# Patient Record
Sex: MALE | Race: OTHER | HISPANIC OR LATINO | Employment: FULL TIME | URBAN - METROPOLITAN AREA
[De-identification: names, ages, dates, MRNs, and addresses within clinical notes are randomized per-mention and may not be internally consistent; named-entity substitution may affect disease eponyms.]

---

## 2018-05-30 ENCOUNTER — APPOINTMENT (OUTPATIENT)
Dept: RADIOLOGY | Facility: MEDICAL CENTER | Age: 43
End: 2018-05-30
Attending: EMERGENCY MEDICINE

## 2018-05-30 ENCOUNTER — HOSPITAL ENCOUNTER (EMERGENCY)
Facility: MEDICAL CENTER | Age: 43
End: 2018-05-30
Attending: EMERGENCY MEDICINE

## 2018-05-30 VITALS
TEMPERATURE: 97.3 F | HEART RATE: 81 BPM | OXYGEN SATURATION: 100 % | HEIGHT: 68 IN | RESPIRATION RATE: 16 BRPM | BODY MASS INDEX: 26.63 KG/M2 | SYSTOLIC BLOOD PRESSURE: 121 MMHG | DIASTOLIC BLOOD PRESSURE: 82 MMHG | WEIGHT: 175.71 LBS

## 2018-05-30 DIAGNOSIS — S20.211A CONTUSION OF RIGHT CHEST WALL, INITIAL ENCOUNTER: ICD-10-CM
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
## 2018-05-30 DIAGNOSIS — S20.211A CONTUSION OF RIB ON RIGHT SIDE, INITIAL ENCOUNTER: ICD-10-CM

## 2018-05-30 DIAGNOSIS — S46.911A SHOULDER STRAIN, RIGHT, INITIAL ENCOUNTER: ICD-10-CM

## 2018-05-30 DIAGNOSIS — T07.XXXA ABRASIONS OF MULTIPLE SITES: ICD-10-CM

## 2018-05-30 PROCEDURE — 71101 X-RAY EXAM UNILAT RIBS/CHEST: CPT | Mod: RT

## 2018-05-30 PROCEDURE — 99283 EMERGENCY DEPT VISIT LOW MDM: CPT

## 2018-05-30 PROCEDURE — 73030 X-RAY EXAM OF SHOULDER: CPT | Mod: RT

## 2018-05-30 RX ORDER — IBUPROFEN 800 MG/1
800 TABLET ORAL EVERY 8 HOURS PRN
Qty: 30 TAB | Refills: 0 | Status: ON HOLD | OUTPATIENT
Start: 2018-05-30 | End: 2020-04-16

## 2018-05-30 RX ORDER — HYDROCODONE BITARTRATE AND ACETAMINOPHEN 5; 325 MG/1; MG/1
1 TABLET ORAL EVERY 8 HOURS PRN
Qty: 9 TAB | Refills: 0 | Status: SHIPPED | OUTPATIENT
Start: 2018-05-30 | End: 2018-06-02

## 2018-05-30 ASSESSMENT — ENCOUNTER SYMPTOMS
NAUSEA: 0
HEADACHES: 1
VOMITING: 0
LOSS OF CONSCIOUSNESS: 0

## 2018-05-30 ASSESSMENT — PAIN SCALES - GENERAL: PAINLEVEL_OUTOF10: 8
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
## 2018-06-06 ASSESSMENT — ENCOUNTER SYMPTOMS
FEVER: 0
SPUTUM PRODUCTION: 0

## 2020-01-23 ENCOUNTER — HOSPITAL ENCOUNTER (EMERGENCY)
Dept: HOSPITAL 8 - ED | Age: 45
Discharge: HOME | End: 2020-01-23
Payer: MEDICAID

## 2020-01-23 VITALS — BODY MASS INDEX: 27.26 KG/M2 | WEIGHT: 179.9 LBS | HEIGHT: 68 IN

## 2020-01-23 VITALS — DIASTOLIC BLOOD PRESSURE: 74 MMHG | SYSTOLIC BLOOD PRESSURE: 146 MMHG

## 2020-01-23 DIAGNOSIS — K04.7: Primary | ICD-10-CM

## 2020-01-23 PROCEDURE — 99283 EMERGENCY DEPT VISIT LOW MDM: CPT

## 2020-04-09 ENCOUNTER — HOSPITAL ENCOUNTER (EMERGENCY)
Dept: HOSPITAL 8 - ED | Age: 45
Discharge: HOME | End: 2020-04-09
Payer: MEDICAID

## 2020-04-09 VITALS — HEIGHT: 71 IN | WEIGHT: 222.47 LBS | BODY MASS INDEX: 31.15 KG/M2

## 2020-04-09 VITALS — SYSTOLIC BLOOD PRESSURE: 108 MMHG | DIASTOLIC BLOOD PRESSURE: 58 MMHG

## 2020-04-09 DIAGNOSIS — R42: ICD-10-CM

## 2020-04-09 DIAGNOSIS — B34.9: Primary | ICD-10-CM

## 2020-04-09 DIAGNOSIS — R00.0: ICD-10-CM

## 2020-04-09 DIAGNOSIS — R06.02: ICD-10-CM

## 2020-04-09 DIAGNOSIS — R73.9: ICD-10-CM

## 2020-04-09 DIAGNOSIS — F17.200: ICD-10-CM

## 2020-04-09 LAB
ALBUMIN SERPL-MCNC: 2.8 G/DL (ref 3.4–5)
ANION GAP SERPL CALC-SCNC: 10 MMOL/L (ref 5–15)
BASOPHILS # BLD AUTO: 0.01 X10^3/UL (ref 0–0.1)
BASOPHILS NFR BLD AUTO: 0 % (ref 0–1)
CALCIUM SERPL-MCNC: 7.2 MG/DL (ref 8.5–10.1)
CHLORIDE SERPL-SCNC: 109 MMOL/L (ref 98–107)
CREAT SERPL-MCNC: 1.26 MG/DL (ref 0.7–1.3)
EOSINOPHIL # BLD AUTO: 0.02 X10^3/UL (ref 0–0.4)
EOSINOPHIL NFR BLD AUTO: 0 % (ref 1–7)
ERYTHROCYTE [DISTWIDTH] IN BLOOD BY AUTOMATED COUNT: 13.1 % (ref 9.4–14.8)
LYMPHOCYTES # BLD AUTO: 0.5 X10^3/UL (ref 1–3.4)
LYMPHOCYTES NFR BLD AUTO: 5 % (ref 22–44)
MCH RBC QN AUTO: 28.6 PG (ref 27.5–34.5)
MCHC RBC AUTO-ENTMCNC: 33.8 G/DL (ref 33.2–36.2)
MCV RBC AUTO: 84.7 FL (ref 81–97)
MD: (no result)
MONOCYTES # BLD AUTO: 0.08 X10^3/UL (ref 0.2–0.8)
MONOCYTES NFR BLD AUTO: 1 % (ref 2–9)
NEUTROPHILS # BLD AUTO: 10.57 X10^3/UL (ref 1.8–6.8)
NEUTROPHILS NFR BLD AUTO: 95 % (ref 42–75)
PH BLDV: 7.39 PH (ref 7.32–7.42)
PLATELET # BLD AUTO: 159 X10^3/UL (ref 130–400)
PMV BLD AUTO: 9 FL (ref 7.4–10.4)
RBC # BLD AUTO: 4.34 X10^6/UL (ref 4.38–5.82)

## 2020-04-09 PROCEDURE — 82010 KETONE BODYS QUAN: CPT

## 2020-04-09 PROCEDURE — 71045 X-RAY EXAM CHEST 1 VIEW: CPT

## 2020-04-09 PROCEDURE — 85025 COMPLETE CBC W/AUTO DIFF WBC: CPT

## 2020-04-09 PROCEDURE — 80048 BASIC METABOLIC PNL TOTAL CA: CPT

## 2020-04-09 PROCEDURE — 93005 ELECTROCARDIOGRAM TRACING: CPT

## 2020-04-09 PROCEDURE — 82040 ASSAY OF SERUM ALBUMIN: CPT

## 2020-04-09 PROCEDURE — 82803 BLOOD GASES ANY COMBINATION: CPT

## 2020-04-09 PROCEDURE — 99285 EMERGENCY DEPT VISIT HI MDM: CPT

## 2020-04-09 PROCEDURE — 36415 COLL VENOUS BLD VENIPUNCTURE: CPT

## 2020-04-09 NOTE — NUR
DISCHARGE INSTRUCTIONS GIVEN TO PATIENT. PT GIVEN PRESCRIPTIONS WITH 
INFORMATION ON MEDICATIONS TO INCLUDE USE AND SIDE EFFECTS. RN DISCUSSES 
CORRECT DISPOSAL OF MEDICATIONS.



PT VERBALIZES UNDERSTANDING OF ALL INSTRUCTIONS AND FOLLOW UP.



PT AMBULATED OUT OF ED PER PEDIS.
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
## 2020-04-15 ENCOUNTER — APPOINTMENT (OUTPATIENT)
Dept: RADIOLOGY | Facility: MEDICAL CENTER | Age: 45
DRG: 617 | End: 2020-04-15
Attending: EMERGENCY MEDICINE
Payer: MEDICAID

## 2020-04-15 ENCOUNTER — HOSPITAL ENCOUNTER (INPATIENT)
Facility: MEDICAL CENTER | Age: 45
LOS: 5 days | DRG: 617 | End: 2020-04-21
Attending: EMERGENCY MEDICINE | Admitting: HOSPITALIST
Payer: MEDICAID

## 2020-04-15 DIAGNOSIS — M86.9 OSTEOMYELITIS OF RIGHT FOOT, UNSPECIFIED TYPE (HCC): ICD-10-CM

## 2020-04-15 DIAGNOSIS — E11.65 TYPE 2 DIABETES MELLITUS WITH HYPERGLYCEMIA, WITHOUT LONG-TERM CURRENT USE OF INSULIN (HCC): ICD-10-CM

## 2020-04-15 DIAGNOSIS — M21.962 DEFORMITY OF LEFT FOOT: ICD-10-CM

## 2020-04-15 DIAGNOSIS — M25.552 ACUTE HIP PAIN, LEFT: ICD-10-CM

## 2020-04-15 DIAGNOSIS — M86.271 SUBACUTE OSTEOMYELITIS OF RIGHT FOOT (HCC): ICD-10-CM

## 2020-04-15 LAB
ANION GAP SERPL CALC-SCNC: 14 MMOL/L (ref 7–16)
ANISOCYTOSIS BLD QL SMEAR: ABNORMAL
BASOPHILS # BLD AUTO: 0 % (ref 0–1.8)
BASOPHILS # BLD: 0 K/UL (ref 0–0.12)
BUN SERPL-MCNC: 20 MG/DL (ref 8–22)
CALCIUM SERPL-MCNC: 8.6 MG/DL (ref 8.5–10.5)
CHLORIDE SERPL-SCNC: 99 MMOL/L (ref 96–112)
CO2 SERPL-SCNC: 20 MMOL/L (ref 20–33)
CREAT SERPL-MCNC: 0.87 MG/DL (ref 0.5–1.4)
EOSINOPHIL # BLD AUTO: 0.22 K/UL (ref 0–0.51)
EOSINOPHIL NFR BLD: 1.8 % (ref 0–6.9)
ERYTHROCYTE [DISTWIDTH] IN BLOOD BY AUTOMATED COUNT: 38.5 FL (ref 35.9–50)
GLUCOSE BLD-MCNC: 230 MG/DL (ref 65–99)
GLUCOSE SERPL-MCNC: 237 MG/DL (ref 65–99)
HCT VFR BLD AUTO: 41 % (ref 42–52)
HGB BLD-MCNC: 13.8 G/DL (ref 14–18)
LYMPHOCYTES # BLD AUTO: 1.97 K/UL (ref 1–4.8)
LYMPHOCYTES NFR BLD: 15.9 % (ref 22–41)
MANUAL DIFF BLD: NORMAL
MCH RBC QN AUTO: 28.2 PG (ref 27–33)
MCHC RBC AUTO-ENTMCNC: 33.7 G/DL (ref 33.7–35.3)
MCV RBC AUTO: 83.7 FL (ref 81.4–97.8)
MICROCYTES BLD QL SMEAR: ABNORMAL
MONOCYTES # BLD AUTO: 0 K/UL (ref 0–0.85)
MONOCYTES NFR BLD AUTO: 0 % (ref 0–13.4)
MORPHOLOGY BLD-IMP: NORMAL
NEUTROPHILS # BLD AUTO: 10.21 K/UL (ref 1.82–7.42)
NEUTROPHILS NFR BLD: 82.3 % (ref 44–72)
NRBC # BLD AUTO: 0 K/UL
NRBC BLD-RTO: 0 /100 WBC
PLATELET # BLD AUTO: 284 K/UL (ref 164–446)
PLATELET BLD QL SMEAR: NORMAL
PMV BLD AUTO: 10.6 FL (ref 9–12.9)
POTASSIUM SERPL-SCNC: 3.9 MMOL/L (ref 3.6–5.5)
RBC # BLD AUTO: 4.9 M/UL (ref 4.7–6.1)
RBC BLD AUTO: PRESENT
SODIUM SERPL-SCNC: 133 MMOL/L (ref 135–145)
VARIANT LYMPHS BLD QL SMEAR: NORMAL
WBC # BLD AUTO: 12.4 K/UL (ref 4.8–10.8)

## 2020-04-15 PROCEDURE — 80048 BASIC METABOLIC PNL TOTAL CA: CPT

## 2020-04-15 PROCEDURE — 82962 GLUCOSE BLOOD TEST: CPT

## 2020-04-15 PROCEDURE — 99285 EMERGENCY DEPT VISIT HI MDM: CPT

## 2020-04-15 PROCEDURE — 83036 HEMOGLOBIN GLYCOSYLATED A1C: CPT

## 2020-04-15 PROCEDURE — 73630 X-RAY EXAM OF FOOT: CPT | Mod: RT

## 2020-04-15 PROCEDURE — 85027 COMPLETE CBC AUTOMATED: CPT

## 2020-04-15 PROCEDURE — 85007 BL SMEAR W/DIFF WBC COUNT: CPT

## 2020-04-16 ENCOUNTER — APPOINTMENT (OUTPATIENT)
Dept: RADIOLOGY | Facility: MEDICAL CENTER | Age: 45
DRG: 617 | End: 2020-04-16
Attending: NURSE PRACTITIONER
Payer: MEDICAID

## 2020-04-16 PROBLEM — E11.65 TYPE 2 DIABETES MELLITUS WITH HYPERGLYCEMIA, WITHOUT LONG-TERM CURRENT USE OF INSULIN (HCC): Status: ACTIVE | Noted: 2020-04-16

## 2020-04-16 PROBLEM — M25.552 ACUTE HIP PAIN, LEFT: Status: ACTIVE | Noted: 2020-04-16

## 2020-04-16 PROBLEM — M00.9 SEPTIC ARTHRITIS OF RIGHT FOOT (HCC): Status: ACTIVE | Noted: 2020-04-16

## 2020-04-16 PROBLEM — D64.9 NORMOCYTIC ANEMIA: Status: ACTIVE | Noted: 2020-04-16

## 2020-04-16 PROBLEM — M86.271 SUBACUTE OSTEOMYELITIS OF RIGHT FOOT (HCC): Status: ACTIVE | Noted: 2020-04-16

## 2020-04-16 LAB
EST. AVERAGE GLUCOSE BLD GHB EST-MCNC: 200 MG/DL
GLUCOSE BLD-MCNC: 199 MG/DL (ref 65–99)
GLUCOSE BLD-MCNC: 201 MG/DL (ref 65–99)
GLUCOSE BLD-MCNC: 204 MG/DL (ref 65–99)
GLUCOSE BLD-MCNC: 252 MG/DL (ref 65–99)
HBA1C MFR BLD: 8.6 % (ref 0–5.6)

## 2020-04-16 PROCEDURE — 36415 COLL VENOUS BLD VENIPUNCTURE: CPT

## 2020-04-16 PROCEDURE — 99255 IP/OBS CONSLTJ NEW/EST HI 80: CPT | Mod: GC | Performed by: INTERNAL MEDICINE

## 2020-04-16 PROCEDURE — A9270 NON-COVERED ITEM OR SERVICE: HCPCS | Performed by: HOSPITALIST

## 2020-04-16 PROCEDURE — 700111 HCHG RX REV CODE 636 W/ 250 OVERRIDE (IP): Performed by: HOSPITALIST

## 2020-04-16 PROCEDURE — 87040 BLOOD CULTURE FOR BACTERIA: CPT

## 2020-04-16 PROCEDURE — 700111 HCHG RX REV CODE 636 W/ 250 OVERRIDE (IP): Performed by: EMERGENCY MEDICINE

## 2020-04-16 PROCEDURE — 700101 HCHG RX REV CODE 250: Performed by: NURSE PRACTITIONER

## 2020-04-16 PROCEDURE — 99223 1ST HOSP IP/OBS HIGH 75: CPT | Performed by: HOSPITALIST

## 2020-04-16 PROCEDURE — 700102 HCHG RX REV CODE 250 W/ 637 OVERRIDE(OP): Performed by: HOSPITALIST

## 2020-04-16 PROCEDURE — 770006 HCHG ROOM/CARE - MED/SURG/GYN SEMI*

## 2020-04-16 PROCEDURE — 700105 HCHG RX REV CODE 258: Performed by: EMERGENCY MEDICINE

## 2020-04-16 PROCEDURE — 99222 1ST HOSP IP/OBS MODERATE 55: CPT | Performed by: NURSE PRACTITIONER

## 2020-04-16 PROCEDURE — 82962 GLUCOSE BLOOD TEST: CPT | Mod: 91

## 2020-04-16 PROCEDURE — 700105 HCHG RX REV CODE 258: Performed by: HOSPITALIST

## 2020-04-16 PROCEDURE — 96365 THER/PROPH/DIAG IV INF INIT: CPT

## 2020-04-16 PROCEDURE — 73501 X-RAY EXAM HIP UNI 1 VIEW: CPT | Mod: LT

## 2020-04-16 PROCEDURE — 96368 THER/DIAG CONCURRENT INF: CPT

## 2020-04-16 RX ORDER — OXYCODONE HYDROCHLORIDE 5 MG/1
2.5 TABLET ORAL
Status: DISCONTINUED | OUTPATIENT
Start: 2020-04-16 | End: 2020-04-21 | Stop reason: HOSPADM

## 2020-04-16 RX ORDER — POLYETHYLENE GLYCOL 3350 17 G/17G
1 POWDER, FOR SOLUTION ORAL
Status: DISCONTINUED | OUTPATIENT
Start: 2020-04-16 | End: 2020-04-21 | Stop reason: HOSPADM

## 2020-04-16 RX ORDER — PROCHLORPERAZINE EDISYLATE 5 MG/ML
5-10 INJECTION INTRAMUSCULAR; INTRAVENOUS EVERY 4 HOURS PRN
Status: DISCONTINUED | OUTPATIENT
Start: 2020-04-16 | End: 2020-04-21 | Stop reason: HOSPADM

## 2020-04-16 RX ORDER — ONDANSETRON 2 MG/ML
4 INJECTION INTRAMUSCULAR; INTRAVENOUS EVERY 4 HOURS PRN
Status: DISCONTINUED | OUTPATIENT
Start: 2020-04-16 | End: 2020-04-21 | Stop reason: HOSPADM

## 2020-04-16 RX ORDER — ONDANSETRON 4 MG/1
4 TABLET, ORALLY DISINTEGRATING ORAL EVERY 4 HOURS PRN
Status: DISCONTINUED | OUTPATIENT
Start: 2020-04-16 | End: 2020-04-21 | Stop reason: HOSPADM

## 2020-04-16 RX ORDER — MORPHINE SULFATE 4 MG/ML
2 INJECTION, SOLUTION INTRAMUSCULAR; INTRAVENOUS
Status: DISCONTINUED | OUTPATIENT
Start: 2020-04-16 | End: 2020-04-21 | Stop reason: HOSPADM

## 2020-04-16 RX ORDER — INSULIN GLARGINE 100 [IU]/ML
0.1 INJECTION, SOLUTION SUBCUTANEOUS EVERY EVENING
Status: DISCONTINUED | OUTPATIENT
Start: 2020-04-16 | End: 2020-04-19

## 2020-04-16 RX ORDER — LIDOCAINE 50 MG/G
1 PATCH TOPICAL EVERY 24 HOURS
Status: DISCONTINUED | OUTPATIENT
Start: 2020-04-16 | End: 2020-04-21 | Stop reason: HOSPADM

## 2020-04-16 RX ORDER — BACLOFEN 10 MG/1
10 TABLET ORAL 3 TIMES DAILY PRN
Status: DISCONTINUED | OUTPATIENT
Start: 2020-04-16 | End: 2020-04-19

## 2020-04-16 RX ORDER — CLONIDINE HYDROCHLORIDE 0.1 MG/1
0.1 TABLET ORAL EVERY 6 HOURS PRN
Status: DISCONTINUED | OUTPATIENT
Start: 2020-04-16 | End: 2020-04-21 | Stop reason: HOSPADM

## 2020-04-16 RX ORDER — DEXTROSE MONOHYDRATE 25 G/50ML
50 INJECTION, SOLUTION INTRAVENOUS
Status: DISCONTINUED | OUTPATIENT
Start: 2020-04-16 | End: 2020-04-19

## 2020-04-16 RX ORDER — BISACODYL 10 MG
10 SUPPOSITORY, RECTAL RECTAL
Status: DISCONTINUED | OUTPATIENT
Start: 2020-04-16 | End: 2020-04-21 | Stop reason: HOSPADM

## 2020-04-16 RX ORDER — SODIUM CHLORIDE, SODIUM LACTATE, POTASSIUM CHLORIDE, CALCIUM CHLORIDE 600; 310; 30; 20 MG/100ML; MG/100ML; MG/100ML; MG/100ML
INJECTION, SOLUTION INTRAVENOUS CONTINUOUS
Status: DISCONTINUED | OUTPATIENT
Start: 2020-04-16 | End: 2020-04-19

## 2020-04-16 RX ORDER — PROMETHAZINE HYDROCHLORIDE 12.5 MG/1
12.5-25 SUPPOSITORY RECTAL EVERY 4 HOURS PRN
Status: DISCONTINUED | OUTPATIENT
Start: 2020-04-16 | End: 2020-04-21 | Stop reason: HOSPADM

## 2020-04-16 RX ORDER — AMOXICILLIN 250 MG
2 CAPSULE ORAL 2 TIMES DAILY
Status: DISCONTINUED | OUTPATIENT
Start: 2020-04-16 | End: 2020-04-21 | Stop reason: HOSPADM

## 2020-04-16 RX ORDER — PROMETHAZINE HYDROCHLORIDE 25 MG/1
12.5-25 TABLET ORAL EVERY 4 HOURS PRN
Status: DISCONTINUED | OUTPATIENT
Start: 2020-04-16 | End: 2020-04-21 | Stop reason: HOSPADM

## 2020-04-16 RX ORDER — ACETAMINOPHEN 325 MG/1
650 TABLET ORAL EVERY 6 HOURS PRN
Status: DISCONTINUED | OUTPATIENT
Start: 2020-04-16 | End: 2020-04-21 | Stop reason: HOSPADM

## 2020-04-16 RX ORDER — OXYCODONE HYDROCHLORIDE 5 MG/1
5 TABLET ORAL
Status: DISCONTINUED | OUTPATIENT
Start: 2020-04-16 | End: 2020-04-21 | Stop reason: HOSPADM

## 2020-04-16 RX ADMIN — VANCOMYCIN HYDROCHLORIDE 900 MG: 500 INJECTION, POWDER, LYOPHILIZED, FOR SOLUTION INTRAVENOUS at 09:22

## 2020-04-16 RX ADMIN — VANCOMYCIN HYDROCHLORIDE 900 MG: 500 INJECTION, POWDER, LYOPHILIZED, FOR SOLUTION INTRAVENOUS at 17:22

## 2020-04-16 RX ADMIN — MORPHINE SULFATE 2 MG: 4 INJECTION INTRAVENOUS at 23:13

## 2020-04-16 RX ADMIN — INSULIN LISPRO 3 UNITS: 100 INJECTION, SOLUTION INTRAVENOUS; SUBCUTANEOUS at 02:31

## 2020-04-16 RX ADMIN — VANCOMYCIN HYDROCHLORIDE 1900 MG: 500 INJECTION, POWDER, LYOPHILIZED, FOR SOLUTION INTRAVENOUS at 00:15

## 2020-04-16 RX ADMIN — INSULIN GLARGINE 8 UNITS: 100 INJECTION, SOLUTION SUBCUTANEOUS at 17:32

## 2020-04-16 RX ADMIN — INSULIN LISPRO 2 UNITS: 100 INJECTION, SOLUTION INTRAVENOUS; SUBCUTANEOUS at 13:14

## 2020-04-16 RX ADMIN — SODIUM CHLORIDE, POTASSIUM CHLORIDE, SODIUM LACTATE AND CALCIUM CHLORIDE: 600; 310; 30; 20 INJECTION, SOLUTION INTRAVENOUS at 05:33

## 2020-04-16 RX ADMIN — AMPICILLIN SODIUM AND SULBACTAM SODIUM 3 G: 2; 1 INJECTION, POWDER, FOR SOLUTION INTRAMUSCULAR; INTRAVENOUS at 13:07

## 2020-04-16 RX ADMIN — OXYCODONE HYDROCHLORIDE 5 MG: 5 TABLET ORAL at 01:52

## 2020-04-16 RX ADMIN — AMPICILLIN SODIUM AND SULBACTAM SODIUM 3 G: 2; 1 INJECTION, POWDER, FOR SOLUTION INTRAMUSCULAR; INTRAVENOUS at 00:01

## 2020-04-16 RX ADMIN — AMPICILLIN SODIUM AND SULBACTAM SODIUM 3 G: 2; 1 INJECTION, POWDER, FOR SOLUTION INTRAMUSCULAR; INTRAVENOUS at 20:22

## 2020-04-16 RX ADMIN — AMPICILLIN SODIUM AND SULBACTAM SODIUM 3 G: 2; 1 INJECTION, POWDER, FOR SOLUTION INTRAMUSCULAR; INTRAVENOUS at 04:39

## 2020-04-16 RX ADMIN — LIDOCAINE 1 PATCH: 50 PATCH TOPICAL at 17:25

## 2020-04-16 RX ADMIN — INSULIN LISPRO 1 UNITS: 100 INJECTION, SOLUTION INTRAVENOUS; SUBCUTANEOUS at 17:30

## 2020-04-16 ASSESSMENT — ENCOUNTER SYMPTOMS
NEUROLOGICAL NEGATIVE: 1
FEVER: 0
GASTROINTESTINAL NEGATIVE: 1
RESPIRATORY NEGATIVE: 1
CHILLS: 0
EYES NEGATIVE: 1
VOMITING: 0
COUGH: 0
CHILLS: 1
CARDIOVASCULAR NEGATIVE: 1
SHORTNESS OF BREATH: 0
PSYCHIATRIC NEGATIVE: 1
DIZZINESS: 0
NAUSEA: 0
BLURRED VISION: 0
FEVER: 1
ABDOMINAL PAIN: 0

## 2020-04-16 ASSESSMENT — COGNITIVE AND FUNCTIONAL STATUS - GENERAL
TURNING FROM BACK TO SIDE WHILE IN FLAT BAD: A LITTLE
MOVING FROM LYING ON BACK TO SITTING ON SIDE OF FLAT BED: A LITTLE
MOVING TO AND FROM BED TO CHAIR: A LITTLE
SUGGESTED CMS G CODE MODIFIER MOBILITY: CK
DRESSING REGULAR LOWER BODY CLOTHING: A LITTLE
HELP NEEDED FOR BATHING: A LITTLE
CLIMB 3 TO 5 STEPS WITH RAILING: A LITTLE
DRESSING REGULAR UPPER BODY CLOTHING: A LITTLE
SUGGESTED CMS G CODE MODIFIER DAILY ACTIVITY: CJ
WALKING IN HOSPITAL ROOM: A LITTLE
DAILY ACTIVITIY SCORE: 20
MOBILITY SCORE: 18
STANDING UP FROM CHAIR USING ARMS: A LITTLE
TOILETING: A LITTLE

## 2020-04-16 ASSESSMENT — LIFESTYLE VARIABLES
AVERAGE NUMBER OF DAYS PER WEEK YOU HAVE A DRINK CONTAINING ALCOHOL: 0
CONSUMPTION TOTAL: NEGATIVE
EVER HAD A DRINK FIRST THING IN THE MORNING TO STEADY YOUR NERVES TO GET RID OF A HANGOVER: NO
ON A TYPICAL DAY WHEN YOU DRINK ALCOHOL HOW MANY DRINKS DO YOU HAVE: 0
TOTAL SCORE: 0
TOTAL SCORE: 0
HAVE PEOPLE ANNOYED YOU BY CRITICIZING YOUR DRINKING: NO
ALCOHOL_USE: NO
HOW MANY TIMES IN THE PAST YEAR HAVE YOU HAD 5 OR MORE DRINKS IN A DAY: 0
EVER_SMOKED: YES
HAVE YOU EVER FELT YOU SHOULD CUT DOWN ON YOUR DRINKING: NO
TOTAL SCORE: 0
EVER FELT BAD OR GUILTY ABOUT YOUR DRINKING: NO

## 2020-04-16 ASSESSMENT — PATIENT HEALTH QUESTIONNAIRE - PHQ9
1. LITTLE INTEREST OR PLEASURE IN DOING THINGS: NOT AT ALL
SUM OF ALL RESPONSES TO PHQ9 QUESTIONS 1 AND 2: 0
2. FEELING DOWN, DEPRESSED, IRRITABLE, OR HOPELESS: NOT AT ALL

## 2020-04-16 NOTE — ED NOTES
Started Vanco infusion. Pt awake, call light within reach, watching TV, comfortable. Waiting on admission bed placement.

## 2020-04-16 NOTE — PROGRESS NOTES
Pt admitted from ED, report received from CARTER Coreas. Pt on unit at approximately 0115 via gurney. Ambulated to bed with standby assist. Dressing placed on right foot wound. Assessment complete. IV vanco infusing, no adverse rxn noted at this time. Medicated for right foot pain per MAR. Call light within reach, personal belongings available, bed in lowest position, treaded socks on, and hourly rounding in place.

## 2020-04-16 NOTE — ASSESSMENT & PLAN NOTE
-Previous diagnosis, not currently on a medical regimen.   -Plan for basal and prandial insulin dosing while here, it may be able to be discharged with oral therapy ultimately.  -4/16 hemoglobin A1c: 8.6%  -Diabetic Educator saw patient; to f/u on Mon; will need supplies ordered before d/c
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
## 2020-04-16 NOTE — WOUND TEAM
Collaborated with LPS JAIMIE Cheatham regarding patient wound consult regarding the right foot and great toe. Per APN, patient most likely going to surgery and is not appropriate for wound team follow at this time, LPS to follow. Completing wound consult, can place new consult if appropriate for wound team.

## 2020-04-16 NOTE — ASSESSMENT & PLAN NOTE
-Leukocytosis   - uncontrolled diabetes mellitus.    -Dr. Robledo completed orthopedic surgery 4/17  -POD#3:  1.  Right foot first ray amputation.  2.  Trimming of dystrophic callus, right foot under first metatarsal.  3.  Percutaneous tendo-Achilles lengthening.  -Continue intravenous antibiotic therapy per ID : Unasyn; can switch to PO Augmentin, pending pathology results for duration of abx; anticipated approx 6wks    -Monitor surgical culture results.  -Limb preservation service following.

## 2020-04-16 NOTE — PROGRESS NOTES
LIMB PRESERVATION SERVICE     45-year-old male newly diagnosed with diabetes.  Presents with right great toe, first MTH  infected ulcer.  Patient has been n.p.o. since midnight for scheduled surgery today.  However he was delivered a neighbor's lunch tray and he ate it despite knowing he had surgery this afternoon.    Surgery canceled    Plan  Diabetic diet  N.p.o. at midnight for surgery tomorrow morning with Dr. Robledo

## 2020-04-16 NOTE — ASSESSMENT & PLAN NOTE
-Likely muscle spasms  -Lidocaine patch  -As needed baclofen started 4/16  -Left hip x-ray negative for acute abnormalities.  -Switched to Flexiril 5mg BID

## 2020-04-16 NOTE — H&P
Hospital Medicine History & Physical Note    Date of Service  4/16/2020    Primary Care Physician  Pcp Not In Computer    Consultants  Orthopedic surgery Dr. Rolle    Code Status  Full code    Chief Complaint  Foot discharge    History of Presenting Illness  45 y.o. male with history of diabetes mellitus for which he has never received treatment, was in his usual state of health until approximately 6 days prior to admission.  He began to notice a callus on his right foot just by his great toe.  He reports no pain associated with a callus, and has limited pain in his feet.  He reports some fever and chills, and noticed on the day prior to admission, that his foot began to have discharge while he was in the shower.  He also noted the callus or at least part of the callus to have fallen off.  He reports that the discharge was foul-smelling.  He denies headache or vision changes, chest pain or shortness of breath, abdominal pain, nausea vomiting, diarrhea or constipation.  He has no other complaints.    Review of Systems  Review of Systems   Constitutional: Positive for chills and fever.   HENT: Negative.    Eyes: Negative.    Respiratory: Negative.    Cardiovascular: Negative.    Gastrointestinal: Negative.    Genitourinary: Negative.    Musculoskeletal: Positive for joint pain.   Skin: Negative.    Neurological: Negative.    Endo/Heme/Allergies: Negative.    Psychiatric/Behavioral: Negative.        Past Medical History  Possible diabetes mellitus    Surgical History   has no past surgical history on file.     Family History  Reviewed and nonpertinent    Social History   reports that he has been smoking. He has never used smokeless tobacco. He reports previous alcohol use. He reports previous drug use.    Allergies  No Known Allergies    Medications  Prior to Admission Medications   Prescriptions Last Dose Informant Patient Reported? Taking?   ibuprofen (MOTRIN) 800 MG Tab   No No   Sig: Take 1 Tab by mouth every 8  hours as needed.      Facility-Administered Medications: None       Physical Exam  Temp:  [36 °C (96.8 °F)] 36 °C (96.8 °F)  Pulse:  [80-98] 80  Resp:  [16-20] 16  BP: (112-134)/(70-83) 134/70  SpO2:  [97 %-100 %] 100 %    Physical Exam  Constitutional:       General: He is not in acute distress.     Appearance: Normal appearance. He is not ill-appearing.   HENT:      Head: Normocephalic and atraumatic.      Nose: Nose normal.      Mouth/Throat:      Mouth: Mucous membranes are moist.      Pharynx: Oropharynx is clear. No oropharyngeal exudate or posterior oropharyngeal erythema.   Eyes:      Extraocular Movements: Extraocular movements intact.      Conjunctiva/sclera: Conjunctivae normal.      Pupils: Pupils are equal, round, and reactive to light.   Neck:      Musculoskeletal: Normal range of motion and neck supple. No muscular tenderness.   Cardiovascular:      Rate and Rhythm: Normal rate and regular rhythm.      Pulses: Normal pulses.      Heart sounds: Normal heart sounds. No murmur. No friction rub. No gallop.    Pulmonary:      Effort: Pulmonary effort is normal. No respiratory distress.      Breath sounds: Normal breath sounds. No wheezing, rhonchi or rales.   Abdominal:      General: Abdomen is flat. Bowel sounds are normal. There is no distension.      Palpations: Abdomen is soft. There is no mass.      Tenderness: There is no abdominal tenderness.   Musculoskeletal: Normal range of motion.         General: Deformity present. No swelling.      Comments: Swelling of right great toe, prior overlying surgical scarring noted, callous noted foul discharge present    Lymphadenopathy:      Cervical: No cervical adenopathy.   Skin:     General: Skin is warm and dry.      Findings: Lesion present.   Neurological:      General: No focal deficit present.      Mental Status: He is alert and oriented to person, place, and time.      Cranial Nerves: No cranial nerve deficit.      Motor: No weakness.      Gait: Gait  normal.   Psychiatric:         Mood and Affect: Mood normal.         Behavior: Behavior normal.         Laboratory:  Recent Labs     04/15/20  2310   WBC 12.4*   RBC 4.90   HEMOGLOBIN 13.8*   HEMATOCRIT 41.0*   MCV 83.7   MCH 28.2   MCHC 33.7   RDW 38.5   PLATELETCT 284   MPV 10.6     Recent Labs     04/15/20  2310   SODIUM 133*   POTASSIUM 3.9   CHLORIDE 99   CO2 20   GLUCOSE 237*   BUN 20   CREATININE 0.87   CALCIUM 8.6     Recent Labs     04/15/20  2310   GLUCOSE 237*         No results for input(s): NTPROBNP in the last 72 hours.      No results for input(s): TROPONINT in the last 72 hours.    Urinalysis:    No results found     Imaging:  DX-FOOT-COMPLETE 3+ RIGHT   Final Result      Soft tissue swelling of the RIGHT great toe with gas present as well as a bony resorption of the distal tuft and interphalangeal joint consistent with osteomyelitis and septic arthritis.            Assessment/Plan:  I anticipate this patient will require at least two midnights for appropriate medical management, necessitating inpatient admission.    * Subacute osteomyelitis of right foot (HCC)  Assessment & Plan  Setting of uncontrolled diabetes mellitus.  Plan for orthopedic evaluation with Dr. Rolle has been consulted.  Additionally, we will start intravenous antibiotic therapy.  Monitor all culture results.    Septic arthritis of right foot (HCC)  Assessment & Plan  Plan for intravenous antibiotic therapy, monitor.  Appreciate orthopedic surgery.    Type 2 diabetes mellitus with hyperglycemia, without long-term current use of insulin (HCC)  Assessment & Plan  Previous diagnosis, not currently on a medical regimen.  Plan for basal and prandial insulin dosing while here, it may be able to be discharged with oral therapy ultimately.    Normocytic anemia  Assessment & Plan  Suspect chronic.  With no current evidence of bleed.  Transfuse if needed for hemoglobin less than 7 gm/dL          VTE prophylaxis: SCD

## 2020-04-16 NOTE — PROGRESS NOTES
"Pharmacy Kinetics   45 y.o. male on vancomycin day # 1 2020    Currently on Vancomycin 900 mg iv q8hr (0100 0900 1700)  Provider specified end date: TBD    Indication for Treatment: osteomylitis    Pertinent history per medical record: Admitted on 4/15/2020 for existing diabetic food wound on right great toe. Pt stated that he received IV anabiotics from [facility], after talking to [place] from [facility] ER, pt was only given 1L NS in ER on , and discharged with albuterol inhaler and benzonatate, none antibiotics given. Pt today was afebrile with mild leukocytosis. CT results showed gas present as well as a bony resorption of the distal tuft and interphalangeal joint consistent with osteomyelitis and septic arthritis. Ortho consult pending.     Other antibiotics: Unasyn 3g IV q6hr x 12 doses ordered    Allergies: Patient has no known allergies.     List concerns for renal function: none    Pertinent cultures to date:   none    MRSA nares swab if pneumonia is a concern (ordered/positive/negative/n-a): ordered    Recent Labs     04/15/20  2310   WBC 12.4*   NEUTSPOLYS 82.30*     Recent Labs     04/15/20  2310   BUN 20   CREATININE 0.87     No results for input(s): VANCOTROUGH, VANCOPEAK, VANCORANDOM in the last 72 hours.    Intake/Output Summary (Last 24 hours) at 2020 0233  Last data filed at 2020 0031  Gross per 24 hour   Intake 100 ml   Output --   Net 100 ml      /68   Pulse 60   Temp 36.3 °C (97.4 °F) (Temporal)   Resp 16   Ht 1.727 m (5' 8\")   Wt 75.6 kg (166 lb 10.7 oz)   SpO2 97%  Temp (24hrs), Av.2 °C (97.1 °F), Min:36 °C (96.8 °F), Max:36.3 °C (97.4 °F)      A/P   1. Vancomycin dose change: new start, 12mg/kg IV q8hrs x3 days ordered  2. Next vancomycin level: not yet ordered, recommend in 2-3 days   3. Goal trough: 12-16 mcg/ml  4. Comments: limited concern for renal accumulation. Pharmacy will continue to monitor, and narrow therapy as able.    Kelly Black, PharmD      "

## 2020-04-16 NOTE — PROGRESS NOTES
2 RN skin check completed with CARTER Dewitt.   Devices in place: PIV.  Skin assessed under devices: yes.  Confirmed pressure ulcers found: no.  New potential pressure ulcers noted: no. Wound consult placed:  yes.    Skin assessment: heels dry/blanching. Elbows pink/blanching. Right great toe plantar with wound, right foot red, tender. Sacrum pink/blanching    The following interventions in place: encouraged to reposition self frequently.

## 2020-04-16 NOTE — ED TRIAGE NOTES
"Chief Complaint   Patient presents with   • Wound Check     existing wound diabetic foot wound on R great toe, seen at  for this friday, given IV antibiotics       Pt presents to ed for diabetic wound to R great toe.  Pt reports being seen at [facility] for this, where he also had fever/chills and was given IV antibiotics and told to follow up with [facility] tomorrow for wound care.  Pt reports symptoms have improved after IV antibiotics, but he reports that the wound has gotten much worse.  Wound is wrapped and malodorous.   in triage, pt reports  \"mentioned I'm diabetic, but I don't take anything for it.\" Denies n/v, chills, lethargy.     Pt placed in wheelchair. Pt placed back in lobby.  Informed of triage process and wait times, thanked for patience.  Pt instructed to notify staff of any symptom changes.    /83   Pulse 98   Temp 36 °C (96.8 °F) (Temporal)   Resp 16   Wt 76.2 kg (168 lb)   SpO2 97%   BMI 25.54 kg/m²    "

## 2020-04-16 NOTE — PROGRESS NOTES
Hospital Medicine Progress Note Update    Interval Update  Today the patient is lying in bed, pleasant and cooperative.  He is fully alert and oriented.  He has 8/10 sharp, nonradiating left hip pain exacerbated by medial and lateral rotation of the leg and palpation.  He took muscle relaxers outpatient which helped the pain.  This started at the same time the right foot callus came off, approximately 1 week ago.  The patient denies pain in his right foot.  He states he has full sensation in all extremities.  He states he had a pin surgically implanted when he was approximately 12 to 14 years old to correct his contracted first toe on his right foot.  He has been told he has high blood sugars, however has not been told he has diabetes.  The patient's fingernails are clubbed and he has onychomycosis in all his toenails.  All systems reviewed and remain otherwise unchanged from prior exam.  Physical exam of all systems in prior exam remains unchanged.    Assessment and Plan  * Subacute osteomyelitis of right foot (HCC)  Assessment & Plan  Leukocytosis   setting of uncontrolled diabetes mellitus.  Dr. Rolle, orthopedic surgery, consulted.  Surgery scheduled for 4/16 and patient counseled about n.p.o., however patient ate lunch.  Surgery rescheduled.    Continue intravenous antibiotic therapy.  Monitor all culture results.  Vital signs stable  Infectious Disease and Limb preservation service following.    Septic arthritis of right foot (HCC)  Assessment & Plan  Continue Unasyn and vancomycin, monitor.  Appreciate orthopedic surgery.    Type 2 diabetes mellitus with hyperglycemia, without long-term current use of insulin (HCC)  Assessment & Plan  Previous diagnosis, not currently on a medical regimen.  Plan for basal and prandial insulin dosing while here, it may be able to be discharged with oral therapy ultimately.  4/16 hemoglobin A1c: 8.6%    Acute hip pain, left  Assessment & Plan  Lidocaine patch  As needed  baclofen started 4/16  Left hip x-ray    Normocytic anemia  Assessment & Plan  Suspect chronic.  With no current evidence of bleed.  Transfuse if needed for hemoglobin less than 7 gm/dL    VTE prophylaxis: SCDs    CHINMAY Tomlinson.
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
## 2020-04-16 NOTE — PROGRESS NOTES
Pharmacy Kinetics Addendum:    45 y.o. male on vancomycin day # 1 4/16/2020    Currently on Vancomycin 900 mg iv q8hr (12mg/kg, 100, 900, 1700)  Provider specified end date: TBD  ID consulted    Indication for Treatment: Osteomyelitis    Recent Labs     04/15/20  2310   BUN 20   CREATININE 0.87     No results for input(s): VANCOTROUGH, VANCOPEAK, VANCORANDOM in the last 72 hours.    A/P   1. Vancomycin dose change: No change  2. Next vancomycin level: 4/17 @ 830  3. Goal trough: 12-16 mcg/mL  4. Comments: No reason to change dosing, will obtain level tomorrow AM to assess safety and efficacy. Minimal concerns for accumulation or toxicity. ID and ortho consulted. May require amputation for definitive source control. Pharmacy to continue to follow for dosing adjustments.     Kenton Brand, PharmD, PharmD

## 2020-04-16 NOTE — DISCHARGE PLANNING
Patient is eligible for Medicaid Meds to Beds at discharge if they have coverage with [place] Medicaid, Medicaid FFS, Medicaid HMO ([facility]), or [place]. This service is provided through the [facility] if orders are received by the pharmacy prior to 4pm Monday through Friday excluding holidays. Preferred pharmacy has been changed to [facility]. Please call [phone] prior to discharge.

## 2020-04-16 NOTE — ED NOTES
"Pt WC to room, pt able to stand independently and transfer, requesting privacy to change into gown.  Pt states he prefers to be called \"Robby\".    "
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
## 2020-04-16 NOTE — ASSESSMENT & PLAN NOTE
-Suspect chronic.    -With no current evidence of bleed.    -Transfuse if needed for hemoglobin less than 7 gm/dL

## 2020-04-16 NOTE — CONSULTS
INFECTIOUS DISEASES INPATIENT CONSULT NOTE     Date of Service: 4/16/2020    Consult Requested By: Seth Thomas M.D.    Reason for Consultation: Right great toe osteomyelitis    History of Present Illness:   Quoc Guillermo is a 45 y.o. male admitted 4/15/2020 with right great toe pain and swelling since 1 week.  Patient states that he had a callus on his right foot since a few weeks.  Around Thursday/Friday last week, he noted subjective fevers, chills.  To feel better, he took a shower and soaked his foot when he noted that his callus fell off and since then had worsening pain and swelling.  Tried to soak his foot in Epsom salt with some medication at home and was wrapping it 3 times a day but as it was not getting better, he went to [facility] where he was treated with IV antibiotics.  He was discharged on Monday with amoxicillin 500 mg 3 times daily.  Did not have any improvement in his pain with the antibiotics and thus presented to our ED.  Also noted a foul-smelling discharge.  He remains afebrile since admission.  Vital signs stable. Admission labs showed mild leukocytosis. X ray of foot showed soft tissue swelling of the right great toe with gas as well as bony resorption consistent with osteomyelitis and septic arthritis.      Review of Systems   Constitutional: Positive for malaise/fatigue. Negative for chills and fever.   HENT: Negative for hearing loss.    Eyes: Negative for blurred vision.   Respiratory: Negative for cough and shortness of breath.    Cardiovascular: Negative for chest pain.   Gastrointestinal: Negative for abdominal pain, nausea and vomiting.   Musculoskeletal: Positive for joint pain.   Skin: Negative for rash.   Neurological: Negative for dizziness.       PMH:   History reviewed. No pertinent past medical history.    PSH:  Past Surgical History:   Procedure Laterality Date   • OTHER ORTHOPEDIC SURGERY      pin placed per pt       FAMILY HX:  History reviewed. No pertinent  family history.    SOCIAL HX:  Social History     Socioeconomic History   • Marital status: Single     Spouse name: Not on file   • Number of children: Not on file   • Years of education: Not on file   • Highest education level: Not on file   Occupational History   • Not on file   Social Needs   • Financial resource strain: Not on file   • Food insecurity     Worry: Not on file     Inability: Not on file   • Transportation needs     Medical: Not on file     Non-medical: Not on file   Tobacco Use   • Smoking status: Current Every Day Smoker     Types: Cigarettes   • Smokeless tobacco: Never Used   Substance and Sexual Activity   • Alcohol use: Not Currently   • Drug use: Not Currently     Comment: clean x1 ye   • Sexual activity: Not on file   Lifestyle   • Physical activity     Days per week: Not on file     Minutes per session: Not on file   • Stress: Not on file   Relationships   • Social connections     Talks on phone: Not on file     Gets together: Not on file     Attends Zoroastrianism service: Not on file     Active member of club or organization: Not on file     Attends meetings of clubs or organizations: Not on file     Relationship status: Not on file   • Intimate partner violence     Fear of current or ex partner: Not on file     Emotionally abused: Not on file     Physically abused: Not on file     Forced sexual activity: Not on file   Other Topics Concern   • Not on file   Social History Narrative   • Not on file     Social History     Tobacco Use   Smoking Status Current Every Day Smoker   • Types: Cigarettes   Smokeless Tobacco Never Used     Social History     Substance and Sexual Activity   Alcohol Use Not Currently   Former meth use, has not been using meth since the last year    Allergies/Intolerances:  No Known Allergies    History reviewed with the patient     Other Current Medications:    Current Facility-Administered Medications:   •  acetaminophen (TYLENOL) tablet 650 mg, 650 mg, Oral, Q6HRS PRN,  Ld Figueroa M.D.  •  Notify provider if pain remains uncontrolled, , , CONTINUOUS **AND** Use the numeric rating scale (NRS-11) on regular floors and Critical-Care Pain Observation Tool (CPOT) on ICUs/Trauma to assess pain, , , CONTINUOUS **AND** Pulse Ox (Oximetry), , , CONTINUOUS **AND** Pharmacy Consult Request ...Pain Management Review 1 Each, 1 Each, Other, PHARMACY TO DOSE **AND** If patient difficult to arouse and/or has respiratory depression, stop any opiates that are currently infusing and call a Rapid Response., , , CONTINUOUS **AND** oxyCODONE immediate-release (ROXICODONE) tablet 2.5 mg, 2.5 mg, Oral, Q3HRS PRN **AND** oxyCODONE immediate-release (ROXICODONE) tablet 5 mg, 5 mg, Oral, Q3HRS PRN, 5 mg at 04/16/20 0152 **AND** morphine (pf) 4 MG/ML injection 2 mg, 2 mg, Intravenous, Q3HRS PRN, Ld Figueroa M.D.  •  cloNIDine (CATAPRES) tablet 0.1 mg, 0.1 mg, Oral, Q6HRS PRN, Ld Figueroa M.D.  •  ondansetron (ZOFRAN) syringe/vial injection 4 mg, 4 mg, Intravenous, Q4HRS PRN, Ld Figueroa M.D.  •  ondansetron (ZOFRAN ODT) dispertab 4 mg, 4 mg, Oral, Q4HRS PRN, Ld Figueroa M.D.  •  promethazine (PHENERGAN) tablet 12.5-25 mg, 12.5-25 mg, Oral, Q4HRS PRN, Ld Figueroa M.D.  •  promethazine (PHENERGAN) suppository 12.5-25 mg, 12.5-25 mg, Rectal, Q4HRS PRN, Ld Figueroa M.D.  •  prochlorperazine (COMPAZINE) injection 5-10 mg, 5-10 mg, Intravenous, Q4HRS PRN, Ld Figueroa M.D.  •  senna-docusate (PERICOLACE or SENOKOT S) 8.6-50 MG per tablet 2 Tab, 2 Tab, Oral, BID **AND** polyethylene glycol/lytes (MIRALAX) PACKET 1 Packet, 1 Packet, Oral, QDAY PRN **AND** magnesium hydroxide (MILK OF MAGNESIA) suspension 30 mL, 30 mL, Oral, QDAY PRN **AND** bisacodyl (DULCOLAX) suppository 10 mg, 10 mg, Rectal, QDAY PRN, Ld Figueroa M.D.  •  lactated ringers infusion, , Intravenous, Continuous, Ld Figueroa M.D., Last Rate: 100 mL/hr at 04/16/20 0533  •  insulin glargine (LANTUS) injection 8  "Units, 0.1 Units/kg/day, Subcutaneous, Q EVENING **AND** insulin lispro (HUMALOG) injection 1-6 Units, 1-6 Units, Subcutaneous, Q6HRS, 2 Units at 20 1314 **AND** POC Blood Glucose, , , Q6H **AND** NOTIFY MD and PharmD, , , Once **AND** glucose 4 g chewable tablet 16 g, 16 g, Oral, Q15 MIN PRN **AND** dextrose 50% (D50W) injection 50 mL, 50 mL, Intravenous, Q15 MIN PRN, Ld Figueroa M.D.  •  ampicillin/sulbactam (UNASYN) 3 g in  mL IVPB, 3 g, Intravenous, Q6HRS, Ld Figueroa M.D., Last Rate: 200 mL/hr at 20 1307, 3 g at 20 1307  •  MD Alert...Vancomycin per Pharmacy, , Other, PHARMACY TO DOSE, Ld Figueroa M.D.  •  vancomycin (VANCOCIN) 900 mg in  mL IVPB, 12 mg/kg, Intravenous, Q8HR, Ld Figueroa M.D., Stopped at 20 1122  [unfilled]    Most Recent Vital Signs:  /60   Pulse 64   Temp 36.2 °C (97.1 °F) (Temporal)   Resp 17   Ht 1.727 m (5' 8\")   Wt 75.6 kg (166 lb 10.7 oz)   SpO2 99%   BMI 25.34 kg/m²   Temp  Av.2 °C (97.2 °F)  Min: 36 °C (96.8 °F)  Max: 36.4 °C (97.5 °F)    Physical Exam:  General: well nourished, no diaphoresis, well-appearing, no acute distress  HEENT: sclera anicteric, PERRL, extraocular muscles intact, mucous membranes moist, oropharynx clear and moist, no oral lesions or exudate  Neck: supple, no lymphadenopathy  Chest: CTAB, no rales, rhonchi or wheezes, normal work of breathing.  Cardiac: regular rate and rhythm, normal S1 S2, no murmurs, rubs or gallops  Abdomen: + bowel sounds, soft, non-tender, non-distended, no hepatosplenomegaly  Extremities: Open wound seen at the base of right great toe. Swelling seen, mildly tender and erythematous  Neuro: Alert and oriented times 3, non-focal exam, speech fluent, full range of motion to bilateral upper and lower extremities      Pertinent Lab Results:  Recent Labs     04/15/20  2310   WBC 12.4*      Recent Labs     04/15/20  2310   HEMOGLOBIN 13.8*   HEMATOCRIT 41.0*   MCV 83.7 " "  MCH 28.2   ANISOCYTOSIS 1+   PLATELETCT 284         Recent Labs     04/15/20  2310   SODIUM 133*   POTASSIUM 3.9   CHLORIDE 99   CO2 20   CREATININE 0.87        No results for input(s): ALBUMIN in the last 72 hours.    Invalid input(s): AST, ALT, ALKPHOS, BILITOT, TOTALBILIRUB, BILIRUBINTOT, BILIRUBINDIR, BILIRUBININD, ALKALINEPHOS     Pertinent Micro:  Results     Procedure Component Value Units Date/Time    BLOOD CULTURE [[id number]] Collected:  04/16/20 0957    Order Status:  Completed Specimen:  Blood from Peripheral Updated:  04/16/20 1004    Narrative:       Per Hospital Policy: Only change Specimen Src: to \"Line\" if  specified by physician order.    BLOOD CULTURE [[id number]] Collected:  04/16/20 0957    Order Status:  Completed Specimen:  Blood from Peripheral Updated:  04/16/20 1004    Narrative:       Per Hospital Policy: Only change Specimen Src: to \"Line\" if  specified by physician order.    CULTURE WOUND W/ GRAM STAIN [[id number]]     Order Status:  No result Specimen:  Wound from Right Foot     MRSA By PCR (Amp) [[id number]]     Order Status:  No result Specimen:  Respirate from Nares         No results found for: BLOODCULTU, BLDCULT, BCHOLD     Studies:  Dx-foot-complete 3+ Right    Result Date: 4/15/2020  4/15/2020 11:05 PM HISTORY/REASON FOR EXAM:  Atraumatic Pain/Swelling/Deformity RIGHT great toe wound. TECHNIQUE/EXAM DESCRIPTION AND NUMBER OF VIEWS: 3 views of the RIGHT foot. COMPARISON:  None. FINDINGS: Gas present within the soft tissues of the plantar aspect of the great toe and at the ball the foot.  Apparent bony destruction of the distal phalanx.  Potential erosion within the 1st interphalangeal joint. Soft tissue swelling of great toe noted. No fracture or dislocation.     Soft tissue swelling of the RIGHT great toe with gas present as well as a bony resorption of the distal tuft and interphalangeal joint consistent with osteomyelitis and septic arthritis.        ASSESSMENT AND " PLAN:    #Right great toe osteomyelitis  -Vital signs stable, mild leukocytosis at 12.4  -On IV fluids  -Continue vancomycin and Unasyn  -Monitor renal function  -Follow-up blood and wound cultures, MRSA by PCR pending  -We will taper antibiotics based on culture results    -Ortho on board- ?possible amputation  -Duration of antibiotic therapy will depend on source control    #Newly diagnosed diabetes mellitus  -A1c 8.6  -Needs tight control of his blood sugars  -Management per primary team    Plan of care discussed with DARRIUS Thomas M.D.. Will continue to follow    Kala Harris M.D.

## 2020-04-16 NOTE — PROGRESS NOTES
Med rec complete per patients pharmacy. Pt unavailable for interview. Unable to review allergies. No ABX filled in last 14 days per pharmacy.

## 2020-04-17 ENCOUNTER — ANESTHESIA (OUTPATIENT)
Dept: SURGERY | Facility: MEDICAL CENTER | Age: 45
DRG: 617 | End: 2020-04-17
Payer: MEDICAID

## 2020-04-17 ENCOUNTER — ANESTHESIA EVENT (OUTPATIENT)
Dept: SURGERY | Facility: MEDICAL CENTER | Age: 45
DRG: 617 | End: 2020-04-17
Payer: MEDICAID

## 2020-04-17 LAB
ANION GAP SERPL CALC-SCNC: 11 MMOL/L (ref 7–16)
BASOPHILS # BLD AUTO: 0.4 % (ref 0–1.8)
BASOPHILS # BLD: 0.05 K/UL (ref 0–0.12)
BUN SERPL-MCNC: 14 MG/DL (ref 8–22)
CALCIUM SERPL-MCNC: 8.6 MG/DL (ref 8.5–10.5)
CHLORIDE SERPL-SCNC: 100 MMOL/L (ref 96–112)
CO2 SERPL-SCNC: 23 MMOL/L (ref 20–33)
CREAT SERPL-MCNC: 0.7 MG/DL (ref 0.5–1.4)
EOSINOPHIL # BLD AUTO: 0.32 K/UL (ref 0–0.51)
EOSINOPHIL NFR BLD: 2.9 % (ref 0–6.9)
ERYTHROCYTE [DISTWIDTH] IN BLOOD BY AUTOMATED COUNT: 38.4 FL (ref 35.9–50)
GLUCOSE BLD-MCNC: 157 MG/DL (ref 65–99)
GLUCOSE BLD-MCNC: 190 MG/DL (ref 65–99)
GLUCOSE BLD-MCNC: 218 MG/DL (ref 65–99)
GLUCOSE BLD-MCNC: 260 MG/DL (ref 65–99)
GLUCOSE SERPL-MCNC: 172 MG/DL (ref 65–99)
GRAM STN SPEC: NORMAL
GRAM STN SPEC: NORMAL
HCT VFR BLD AUTO: 34.2 % (ref 42–52)
HGB BLD-MCNC: 11.7 G/DL (ref 14–18)
IMM GRANULOCYTES # BLD AUTO: 0.12 K/UL (ref 0–0.11)
IMM GRANULOCYTES NFR BLD AUTO: 1.1 % (ref 0–0.9)
LYMPHOCYTES # BLD AUTO: 2.21 K/UL (ref 1–4.8)
LYMPHOCYTES NFR BLD: 19.7 % (ref 22–41)
MCH RBC QN AUTO: 28.4 PG (ref 27–33)
MCHC RBC AUTO-ENTMCNC: 34.2 G/DL (ref 33.7–35.3)
MCV RBC AUTO: 83 FL (ref 81.4–97.8)
MONOCYTES # BLD AUTO: 0.86 K/UL (ref 0–0.85)
MONOCYTES NFR BLD AUTO: 7.7 % (ref 0–13.4)
NEUTROPHILS # BLD AUTO: 7.64 K/UL (ref 1.82–7.42)
NEUTROPHILS NFR BLD: 68.2 % (ref 44–72)
NRBC # BLD AUTO: 0 K/UL
NRBC BLD-RTO: 0 /100 WBC
PATHOLOGY CONSULT NOTE: NORMAL
PLATELET # BLD AUTO: 328 K/UL (ref 164–446)
PMV BLD AUTO: 10.1 FL (ref 9–12.9)
POTASSIUM SERPL-SCNC: 4.1 MMOL/L (ref 3.6–5.5)
RBC # BLD AUTO: 4.12 M/UL (ref 4.7–6.1)
SIGNIFICANT IND 70042: NORMAL
SIGNIFICANT IND 70042: NORMAL
SITE SITE: NORMAL
SITE SITE: NORMAL
SODIUM SERPL-SCNC: 134 MMOL/L (ref 135–145)
SOURCE SOURCE: NORMAL
SOURCE SOURCE: NORMAL
WBC # BLD AUTO: 11.2 K/UL (ref 4.8–10.8)

## 2020-04-17 PROCEDURE — 700102 HCHG RX REV CODE 250 W/ 637 OVERRIDE(OP): Performed by: ANESTHESIOLOGY

## 2020-04-17 PROCEDURE — 700111 HCHG RX REV CODE 636 W/ 250 OVERRIDE (IP): Performed by: HOSPITALIST

## 2020-04-17 PROCEDURE — 99233 SBSQ HOSP IP/OBS HIGH 50: CPT | Performed by: FAMILY MEDICINE

## 2020-04-17 PROCEDURE — 0JBQ0ZZ EXCISION OF RIGHT FOOT SUBCUTANEOUS TISSUE AND FASCIA, OPEN APPROACH: ICD-10-PCS | Performed by: ORTHOPAEDIC SURGERY

## 2020-04-17 PROCEDURE — 88311 DECALCIFY TISSUE: CPT

## 2020-04-17 PROCEDURE — 87205 SMEAR GRAM STAIN: CPT

## 2020-04-17 PROCEDURE — 0L8N3ZZ DIVISION OF RIGHT LOWER LEG TENDON, PERCUTANEOUS APPROACH: ICD-10-PCS | Performed by: ORTHOPAEDIC SURGERY

## 2020-04-17 PROCEDURE — 700111 HCHG RX REV CODE 636 W/ 250 OVERRIDE (IP): Performed by: ANESTHESIOLOGY

## 2020-04-17 PROCEDURE — 700101 HCHG RX REV CODE 250: Performed by: ORTHOPAEDIC SURGERY

## 2020-04-17 PROCEDURE — 700102 HCHG RX REV CODE 250 W/ 637 OVERRIDE(OP): Performed by: HOSPITALIST

## 2020-04-17 PROCEDURE — 0Y6M0Z9 DETACHMENT AT RIGHT FOOT, PARTIAL 1ST RAY, OPEN APPROACH: ICD-10-PCS | Performed by: ORTHOPAEDIC SURGERY

## 2020-04-17 PROCEDURE — 36415 COLL VENOUS BLD VENIPUNCTURE: CPT

## 2020-04-17 PROCEDURE — 87186 SC STD MICRODIL/AGAR DIL: CPT

## 2020-04-17 PROCEDURE — 160039 HCHG SURGERY MINUTES - EA ADDL 1 MIN LEVEL 3: Performed by: ORTHOPAEDIC SURGERY

## 2020-04-17 PROCEDURE — A9270 NON-COVERED ITEM OR SERVICE: HCPCS | Performed by: ANESTHESIOLOGY

## 2020-04-17 PROCEDURE — 700101 HCHG RX REV CODE 250: Performed by: ANESTHESIOLOGY

## 2020-04-17 PROCEDURE — 80048 BASIC METABOLIC PNL TOTAL CA: CPT

## 2020-04-17 PROCEDURE — 82962 GLUCOSE BLOOD TEST: CPT

## 2020-04-17 PROCEDURE — 160002 HCHG RECOVERY MINUTES (STAT): Performed by: ORTHOPAEDIC SURGERY

## 2020-04-17 PROCEDURE — 85025 COMPLETE CBC W/AUTO DIFF WBC: CPT

## 2020-04-17 PROCEDURE — 87077 CULTURE AEROBIC IDENTIFY: CPT | Mod: 91

## 2020-04-17 PROCEDURE — 501838 HCHG SUTURE GENERAL: Performed by: ORTHOPAEDIC SURGERY

## 2020-04-17 PROCEDURE — 160028 HCHG SURGERY MINUTES - 1ST 30 MINS LEVEL 3: Performed by: ORTHOPAEDIC SURGERY

## 2020-04-17 PROCEDURE — 700105 HCHG RX REV CODE 258

## 2020-04-17 PROCEDURE — 99233 SBSQ HOSP IP/OBS HIGH 50: CPT | Mod: GC | Performed by: INTERNAL MEDICINE

## 2020-04-17 PROCEDURE — 700105 HCHG RX REV CODE 258: Performed by: HOSPITALIST

## 2020-04-17 PROCEDURE — 770006 HCHG ROOM/CARE - MED/SURG/GYN SEMI*

## 2020-04-17 PROCEDURE — 160035 HCHG PACU - 1ST 60 MINS PHASE I: Performed by: ORTHOPAEDIC SURGERY

## 2020-04-17 PROCEDURE — 700105 HCHG RX REV CODE 258: Performed by: ANESTHESIOLOGY

## 2020-04-17 PROCEDURE — 160009 HCHG ANES TIME/MIN: Performed by: ORTHOPAEDIC SURGERY

## 2020-04-17 PROCEDURE — 160048 HCHG OR STATISTICAL LEVEL 1-5: Performed by: ORTHOPAEDIC SURGERY

## 2020-04-17 PROCEDURE — 87075 CULTR BACTERIA EXCEPT BLOOD: CPT

## 2020-04-17 PROCEDURE — 88305 TISSUE EXAM BY PATHOLOGIST: CPT

## 2020-04-17 PROCEDURE — A9270 NON-COVERED ITEM OR SERVICE: HCPCS | Performed by: HOSPITALIST

## 2020-04-17 PROCEDURE — 160036 HCHG PACU - EA ADDL 30 MINS PHASE I: Performed by: ORTHOPAEDIC SURGERY

## 2020-04-17 PROCEDURE — 87070 CULTURE OTHR SPECIMN AEROBIC: CPT

## 2020-04-17 PROCEDURE — 87015 SPECIMEN INFECT AGNT CONCNTJ: CPT

## 2020-04-17 PROCEDURE — 700101 HCHG RX REV CODE 250: Performed by: NURSE PRACTITIONER

## 2020-04-17 PROCEDURE — 500881 HCHG PACK, EXTREMITY: Performed by: ORTHOPAEDIC SURGERY

## 2020-04-17 RX ORDER — SODIUM CHLORIDE, SODIUM GLUCONATE, SODIUM ACETATE, POTASSIUM CHLORIDE AND MAGNESIUM CHLORIDE 526; 502; 368; 37; 30 MG/100ML; MG/100ML; MG/100ML; MG/100ML; MG/100ML
INJECTION, SOLUTION INTRAVENOUS
Status: DISCONTINUED | OUTPATIENT
Start: 2020-04-17 | End: 2020-04-17 | Stop reason: SURG

## 2020-04-17 RX ORDER — ONDANSETRON 2 MG/ML
INJECTION INTRAMUSCULAR; INTRAVENOUS PRN
Status: DISCONTINUED | OUTPATIENT
Start: 2020-04-17 | End: 2020-04-17 | Stop reason: SURG

## 2020-04-17 RX ORDER — HALOPERIDOL 5 MG/ML
1 INJECTION INTRAMUSCULAR
Status: DISCONTINUED | OUTPATIENT
Start: 2020-04-17 | End: 2020-04-17 | Stop reason: HOSPADM

## 2020-04-17 RX ORDER — SODIUM CHLORIDE 9 MG/ML
INJECTION, SOLUTION INTRAVENOUS
Status: COMPLETED
Start: 2020-04-17 | End: 2020-04-17

## 2020-04-17 RX ORDER — ROCURONIUM BROMIDE 10 MG/ML
INJECTION, SOLUTION INTRAVENOUS PRN
Status: DISCONTINUED | OUTPATIENT
Start: 2020-04-17 | End: 2020-04-17 | Stop reason: SURG

## 2020-04-17 RX ORDER — HYDROMORPHONE HYDROCHLORIDE 1 MG/ML
0.4 INJECTION, SOLUTION INTRAMUSCULAR; INTRAVENOUS; SUBCUTANEOUS
Status: DISCONTINUED | OUTPATIENT
Start: 2020-04-17 | End: 2020-04-17 | Stop reason: HOSPADM

## 2020-04-17 RX ORDER — DEXAMETHASONE SODIUM PHOSPHATE 4 MG/ML
INJECTION, SOLUTION INTRA-ARTICULAR; INTRALESIONAL; INTRAMUSCULAR; INTRAVENOUS; SOFT TISSUE PRN
Status: DISCONTINUED | OUTPATIENT
Start: 2020-04-17 | End: 2020-04-17 | Stop reason: SURG

## 2020-04-17 RX ORDER — LIDOCAINE HYDROCHLORIDE 20 MG/ML
INJECTION, SOLUTION EPIDURAL; INFILTRATION; INTRACAUDAL; PERINEURAL PRN
Status: DISCONTINUED | OUTPATIENT
Start: 2020-04-17 | End: 2020-04-17 | Stop reason: SURG

## 2020-04-17 RX ORDER — CEFAZOLIN SODIUM 1 G/3ML
INJECTION, POWDER, FOR SOLUTION INTRAMUSCULAR; INTRAVENOUS PRN
Status: DISCONTINUED | OUTPATIENT
Start: 2020-04-17 | End: 2020-04-17 | Stop reason: SURG

## 2020-04-17 RX ORDER — SODIUM CHLORIDE, SODIUM GLUCONATE, SODIUM ACETATE, POTASSIUM CHLORIDE AND MAGNESIUM CHLORIDE 526; 502; 368; 37; 30 MG/100ML; MG/100ML; MG/100ML; MG/100ML; MG/100ML
500 INJECTION, SOLUTION INTRAVENOUS CONTINUOUS
Status: DISCONTINUED | OUTPATIENT
Start: 2020-04-17 | End: 2020-04-17 | Stop reason: HOSPADM

## 2020-04-17 RX ORDER — HYDROMORPHONE HYDROCHLORIDE 1 MG/ML
1 INJECTION, SOLUTION INTRAMUSCULAR; INTRAVENOUS; SUBCUTANEOUS
Status: DISCONTINUED | OUTPATIENT
Start: 2020-04-17 | End: 2020-04-17 | Stop reason: HOSPADM

## 2020-04-17 RX ORDER — ONDANSETRON 2 MG/ML
4 INJECTION INTRAMUSCULAR; INTRAVENOUS
Status: DISCONTINUED | OUTPATIENT
Start: 2020-04-17 | End: 2020-04-17 | Stop reason: HOSPADM

## 2020-04-17 RX ORDER — HYDROMORPHONE HYDROCHLORIDE 1 MG/ML
0.2 INJECTION, SOLUTION INTRAMUSCULAR; INTRAVENOUS; SUBCUTANEOUS
Status: DISCONTINUED | OUTPATIENT
Start: 2020-04-17 | End: 2020-04-17 | Stop reason: HOSPADM

## 2020-04-17 RX ORDER — MIDAZOLAM HYDROCHLORIDE 1 MG/ML
1 INJECTION INTRAMUSCULAR; INTRAVENOUS
Status: DISCONTINUED | OUTPATIENT
Start: 2020-04-17 | End: 2020-04-17 | Stop reason: HOSPADM

## 2020-04-17 RX ORDER — MEPERIDINE HYDROCHLORIDE 25 MG/ML
12.5 INJECTION INTRAMUSCULAR; INTRAVENOUS; SUBCUTANEOUS
Status: DISCONTINUED | OUTPATIENT
Start: 2020-04-17 | End: 2020-04-17 | Stop reason: HOSPADM

## 2020-04-17 RX ORDER — BUPIVACAINE HYDROCHLORIDE 5 MG/ML
INJECTION, SOLUTION EPIDURAL; INTRACAUDAL
Status: DISCONTINUED | OUTPATIENT
Start: 2020-04-17 | End: 2020-04-17 | Stop reason: HOSPADM

## 2020-04-17 RX ORDER — DIPHENHYDRAMINE HYDROCHLORIDE 50 MG/ML
12.5 INJECTION INTRAMUSCULAR; INTRAVENOUS
Status: DISCONTINUED | OUTPATIENT
Start: 2020-04-17 | End: 2020-04-17 | Stop reason: HOSPADM

## 2020-04-17 RX ORDER — OXYCODONE HCL 5 MG/5 ML
5 SOLUTION, ORAL ORAL
Status: COMPLETED | OUTPATIENT
Start: 2020-04-17 | End: 2020-04-17

## 2020-04-17 RX ORDER — OXYCODONE HCL 5 MG/5 ML
10 SOLUTION, ORAL ORAL
Status: COMPLETED | OUTPATIENT
Start: 2020-04-17 | End: 2020-04-17

## 2020-04-17 RX ORDER — IPRATROPIUM BROMIDE AND ALBUTEROL SULFATE 2.5; .5 MG/3ML; MG/3ML
3 SOLUTION RESPIRATORY (INHALATION)
Status: DISCONTINUED | OUTPATIENT
Start: 2020-04-17 | End: 2020-04-17 | Stop reason: HOSPADM

## 2020-04-17 RX ORDER — KETOROLAC TROMETHAMINE 30 MG/ML
INJECTION, SOLUTION INTRAMUSCULAR; INTRAVENOUS PRN
Status: DISCONTINUED | OUTPATIENT
Start: 2020-04-17 | End: 2020-04-17 | Stop reason: SURG

## 2020-04-17 RX ADMIN — DEXAMETHASONE SODIUM PHOSPHATE 8 MG: 4 INJECTION, SOLUTION INTRA-ARTICULAR; INTRALESIONAL; INTRAMUSCULAR; INTRAVENOUS; SOFT TISSUE at 07:51

## 2020-04-17 RX ADMIN — VANCOMYCIN HYDROCHLORIDE 900 MG: 500 INJECTION, POWDER, LYOPHILIZED, FOR SOLUTION INTRAVENOUS at 17:23

## 2020-04-17 RX ADMIN — OXYCODONE HYDROCHLORIDE 10 MG: 5 SOLUTION ORAL at 09:19

## 2020-04-17 RX ADMIN — SODIUM CHLORIDE, SODIUM GLUCONATE, SODIUM ACETATE, POTASSIUM CHLORIDE AND MAGNESIUM CHLORIDE: 526; 502; 368; 37; 30 INJECTION, SOLUTION INTRAVENOUS at 08:17

## 2020-04-17 RX ADMIN — FENTANYL CITRATE 50 MCG: 0.05 INJECTION, SOLUTION INTRAMUSCULAR; INTRAVENOUS at 09:19

## 2020-04-17 RX ADMIN — CEFAZOLIN 2 G: 330 INJECTION, POWDER, FOR SOLUTION INTRAMUSCULAR; INTRAVENOUS at 07:38

## 2020-04-17 RX ADMIN — SODIUM CHLORIDE, POTASSIUM CHLORIDE, SODIUM LACTATE AND CALCIUM CHLORIDE: 600; 310; 30; 20 INJECTION, SOLUTION INTRAVENOUS at 15:25

## 2020-04-17 RX ADMIN — AMPICILLIN SODIUM AND SULBACTAM SODIUM 3 G: 2; 1 INJECTION, POWDER, FOR SOLUTION INTRAMUSCULAR; INTRAVENOUS at 02:35

## 2020-04-17 RX ADMIN — VANCOMYCIN HYDROCHLORIDE 900 MG: 500 INJECTION, POWDER, LYOPHILIZED, FOR SOLUTION INTRAVENOUS at 00:00

## 2020-04-17 RX ADMIN — SUGAMMADEX 200 MG: 100 INJECTION, SOLUTION INTRAVENOUS at 08:45

## 2020-04-17 RX ADMIN — INSULIN GLARGINE 8 UNITS: 100 INJECTION, SOLUTION SUBCUTANEOUS at 17:24

## 2020-04-17 RX ADMIN — MIDAZOLAM HYDROCHLORIDE 2 MG: 1 INJECTION, SOLUTION INTRAMUSCULAR; INTRAVENOUS at 07:40

## 2020-04-17 RX ADMIN — PROPOFOL 150 MG: 10 INJECTION, EMULSION INTRAVENOUS at 07:47

## 2020-04-17 RX ADMIN — ONDANSETRON 4 MG: 2 INJECTION INTRAMUSCULAR; INTRAVENOUS at 08:45

## 2020-04-17 RX ADMIN — SENNOSIDES AND DOCUSATE SODIUM 2 TABLET: 8.6; 5 TABLET ORAL at 17:23

## 2020-04-17 RX ADMIN — FENTANYL CITRATE 100 MCG: 50 INJECTION, SOLUTION INTRAMUSCULAR; INTRAVENOUS at 08:15

## 2020-04-17 RX ADMIN — FENTANYL CITRATE 100 MCG: 50 INJECTION, SOLUTION INTRAMUSCULAR; INTRAVENOUS at 07:40

## 2020-04-17 RX ADMIN — KETOROLAC TROMETHAMINE 30 MG: 30 INJECTION, SOLUTION INTRAMUSCULAR at 08:45

## 2020-04-17 RX ADMIN — OXYCODONE HYDROCHLORIDE 5 MG: 5 TABLET ORAL at 22:14

## 2020-04-17 RX ADMIN — INSULIN LISPRO 3 UNITS: 100 INJECTION, SOLUTION INTRAVENOUS; SUBCUTANEOUS at 17:28

## 2020-04-17 RX ADMIN — LIDOCAINE 1 PATCH: 50 PATCH TOPICAL at 17:23

## 2020-04-17 RX ADMIN — SODIUM CHLORIDE 500 ML: 9 INJECTION, SOLUTION INTRAVENOUS at 13:19

## 2020-04-17 RX ADMIN — LIDOCAINE HYDROCHLORIDE 50 MG: 20 INJECTION, SOLUTION EPIDURAL; INFILTRATION; INTRACAUDAL at 07:47

## 2020-04-17 RX ADMIN — AMPICILLIN SODIUM AND SULBACTAM SODIUM 3 G: 2; 1 INJECTION, POWDER, FOR SOLUTION INTRAMUSCULAR; INTRAVENOUS at 13:18

## 2020-04-17 RX ADMIN — INSULIN LISPRO 2 UNITS: 100 INJECTION, SOLUTION INTRAVENOUS; SUBCUTANEOUS at 13:57

## 2020-04-17 RX ADMIN — MORPHINE SULFATE 2 MG: 4 INJECTION INTRAVENOUS at 14:03

## 2020-04-17 RX ADMIN — AMPICILLIN SODIUM AND SULBACTAM SODIUM 3 G: 2; 1 INJECTION, POWDER, FOR SOLUTION INTRAMUSCULAR; INTRAVENOUS at 20:13

## 2020-04-17 RX ADMIN — ROCURONIUM BROMIDE 50 MG: 10 INJECTION, SOLUTION INTRAVENOUS at 07:47

## 2020-04-17 ASSESSMENT — ENCOUNTER SYMPTOMS
EYES NEGATIVE: 1
DIZZINESS: 0
HEARTBURN: 0
HEMOPTYSIS: 0
CONSTIPATION: 0
VOMITING: 0
ABDOMINAL PAIN: 0
SENSORY CHANGE: 0
NERVOUS/ANXIOUS: 0
HEADACHES: 0
SHORTNESS OF BREATH: 0
TINGLING: 0
DEPRESSION: 0
FEVER: 0
DIARRHEA: 0
PALPITATIONS: 0
NAUSEA: 1
WEAKNESS: 0
COUGH: 0
CHILLS: 0

## 2020-04-17 ASSESSMENT — PATIENT HEALTH QUESTIONNAIRE - PHQ9
1. LITTLE INTEREST OR PLEASURE IN DOING THINGS: NOT AT ALL
2. FEELING DOWN, DEPRESSED, IRRITABLE, OR HOPELESS: NOT AT ALL
SUM OF ALL RESPONSES TO PHQ9 QUESTIONS 1 AND 2: 0
2. FEELING DOWN, DEPRESSED, IRRITABLE, OR HOPELESS: NOT AT ALL
1. LITTLE INTEREST OR PLEASURE IN DOING THINGS: NOT AT ALL
SUM OF ALL RESPONSES TO PHQ9 QUESTIONS 1 AND 2: 0

## 2020-04-17 ASSESSMENT — LIFESTYLE VARIABLES
HOW MANY TIMES IN THE PAST YEAR HAVE YOU HAD 5 OR MORE DRINKS IN A DAY: 0
HAVE PEOPLE ANNOYED YOU BY CRITICIZING YOUR DRINKING: NO
HAVE YOU EVER FELT YOU SHOULD CUT DOWN ON YOUR DRINKING: NO
ON A TYPICAL DAY WHEN YOU DRINK ALCOHOL HOW MANY DRINKS DO YOU HAVE: 0
EVER HAD A DRINK FIRST THING IN THE MORNING TO STEADY YOUR NERVES TO GET RID OF A HANGOVER: NO
AVERAGE NUMBER OF DAYS PER WEEK YOU HAVE A DRINK CONTAINING ALCOHOL: 0
CONSUMPTION TOTAL: NEGATIVE
DOES PATIENT WANT TO STOP DRINKING: NO
ALCOHOL_USE: NO
EVER FELT BAD OR GUILTY ABOUT YOUR DRINKING: NO
TOTAL SCORE: 0

## 2020-04-17 ASSESSMENT — PAIN SCALES - GENERAL: PAIN_LEVEL: 2

## 2020-04-17 ASSESSMENT — COPD QUESTIONNAIRES
DO YOU EVER COUGH UP ANY MUCUS OR PHLEGM?: NO/ONLY WITH OCCASIONAL COLDS OR INFECTIONS
IN THE PAST 12 MONTHS DO YOU DO LESS THAN YOU USED TO BECAUSE OF YOUR BREATHING PROBLEMS: DISAGREE/UNSURE
DURING THE PAST 4 WEEKS HOW MUCH DID YOU FEEL SHORT OF BREATH: NONE/LITTLE OF THE TIME
HAVE YOU SMOKED AT LEAST 100 CIGARETTES IN YOUR ENTIRE LIFE: NO/DON'T KNOW

## 2020-04-17 NOTE — PROGRESS NOTES
[facility] Medicine Daily Progress Note    Date of Service  4/17/2020    Chief Complaint  Foot discharge    Hospital Course    45 y.o. male with history of diabetes mellitus for which he has never received treatment, was in his usual state of health until approximately 6 days prior to admission.  He began to notice a callus on his right foot just by his great toe.  He reports no pain associated with a callus, and has limited pain in his feet.  He reports some fever and chills, and noticed on the day prior to admission, that his foot began to have discharge while he was in the shower.  He also noted the callus or at least part of the callus to have fallen off.  He reports that the discharge was foul-smelling.   He also has 8/10 sharp, nonradiating left hip pain exacerbated by medial and lateral rotation of the leg and palpation.  He took muscle relaxers outpatient which helped the pain.  This problem started at the same time the right foot callus came off, approximately 1 week ago.  He states he has full sensation in all extremities.  He states he had a pin surgically implanted when he was approximately 12 to 14 years old to correct his contracted first toe on his right foot.  He has been told he has high blood sugars, however has not been told he has diabetes prior to this hospitalization.      Interval Problem Update  Today the patient is sitting upright in bed, pleasant and cooperative.  He is fully alert and oriented, however still drowsy from anesthesia.  He has 6/10 pain in his right foot at the surgical site.  He denies pain elsewhere and any other problems at this time.  He had a bowel movement prior to admission, however sometimes goes days without a bowel movement.    Amputation of first ray of the right foot was completed with Dr. Robledo this morning without incident.  Limb preservation service is following.  Diabetes educator met with patient. He verbalizes basic understanding of disease and necessary  follow-up.  Vital signs have remained within normal limits patient has remained afebrile this admission.    Consultants/Specialty  Dr. Street, orthopedic surgery  Limb preservation service  Infectious disease    Code Status  Full    Disposition  To be determined by antibiotic course recommended by infectious disease    Review of Systems  Review of Systems   Constitutional: Negative for chills and fever.   HENT: Negative.    Eyes: Negative.    Respiratory: Negative for cough, hemoptysis and shortness of breath.    Cardiovascular: Negative for chest pain and palpitations.   Gastrointestinal: Positive for nausea. Negative for abdominal pain, constipation, diarrhea, heartburn and vomiting.   Genitourinary: Negative for dysuria and frequency.   Musculoskeletal: Positive for joint pain.   Skin: Negative.    Neurological: Negative for dizziness, tingling, sensory change, weakness and headaches.   Psychiatric/Behavioral: Negative for depression. The patient is not nervous/anxious.         Physical Exam  Temp:  [36.2 °C (97.1 °F)-37.3 °C (99.2 °F)] 36.4 °C (97.5 °F)  Pulse:  [60-78] 68  Resp:  [11-17] 16  BP: (104-125)/(55-71) 107/68  SpO2:  [97 %-100 %] 100 %    Physical Exam  Vitals signs and nursing note reviewed.   Constitutional:       General: He is awake. He is not in acute distress.     Appearance: Normal appearance. He is normal weight. He is not ill-appearing.   HENT:      Head: Normocephalic and atraumatic.      Nose: Nose normal.      Mouth/Throat:      Mouth: Mucous membranes are moist.      Pharynx: Oropharynx is clear. No oropharyngeal exudate or posterior oropharyngeal erythema.   Eyes:      Extraocular Movements: Extraocular movements intact.      Conjunctiva/sclera: Conjunctivae normal.      Pupils: Pupils are equal, round, and reactive to light.   Neck:      Musculoskeletal: Normal range of motion and neck supple. No muscular tenderness.   Cardiovascular:      Rate and Rhythm: Normal rate and regular  rhythm.      Pulses: Normal pulses.      Heart sounds: Normal heart sounds. No murmur. No friction rub. No gallop.    Pulmonary:      Effort: Pulmonary effort is normal. No respiratory distress.      Breath sounds: Normal breath sounds. No wheezing, rhonchi or rales.   Abdominal:      General: Abdomen is flat. Bowel sounds are normal. There is no distension.      Palpations: Abdomen is soft. There is no mass.      Tenderness: There is no abdominal tenderness.   Musculoskeletal: Normal range of motion.         General: Deformity present. No swelling.      Comments: Swelling of right great toe, prior overlying surgical scarring noted, callous noted foul discharge present    Feet:      Right foot:      Amputation: (1st MT ray)     Toenail Condition: Right toenails are abnormally thick. Fungal disease present.     Left foot:      Toenail Condition: Left toenails are abnormally thick. Fungal disease present.  Lymphadenopathy:      Cervical: No cervical adenopathy.   Skin:     General: Skin is warm and dry.      Nails: There is clubbing.   Neurological:      General: No focal deficit present.      Mental Status: He is alert and oriented to person, place, and time.      Cranial Nerves: No cranial nerve deficit.      Motor: No weakness.      Gait: Gait normal.   Psychiatric:         Mood and Affect: Mood normal.         Speech: Speech normal.         Behavior: Behavior normal. Behavior is cooperative.         Thought Content: Thought content normal.         Cognition and Memory: Cognition normal.         Fluids    Intake/Output Summary (Last 24 hours) at 4/17/2020 1509  Last data filed at 4/17/2020 1220  Gross per 24 hour   Intake 940 ml   Output 1225 ml   Net -285 ml       Laboratory  Recent Labs     04/15/20  2310 04/17/20  0055   WBC 12.4* 11.2*   RBC 4.90 4.12*   HEMOGLOBIN 13.8* 11.7*   HEMATOCRIT 41.0* 34.2*   MCV 83.7 83.0   MCH 28.2 28.4   MCHC 33.7 34.2   RDW 38.5 38.4   PLATELETCT 284 328   MPV 10.6 10.1  "    Recent Labs     04/15/20  2310 04/17/20  0055   SODIUM 133* 134*   POTASSIUM 3.9 4.1   CHLORIDE 99 100   CO2 20 23   GLUCOSE 237* 172*   BUN 20 14   CREATININE 0.87 0.70   CALCIUM 8.6 8.6          Recent Labs     04/15/20  2227 04/16/20  0138 04/16/20  0448 04/16/20  1311 04/16/20  1729 04/16/20  2353 04/17/20  0529 04/17/20  1111   POCGLUCOSE 230* 252* 204* 201* 199* 157* 190* 218*            Results     Procedure Component Value Units Date/Time    Anaerobic Culture [[id number]] Collected:  04/17/20 0825    Order Status:  Completed Specimen:  Other Updated:  04/17/20 1001    CULTURE TISSUE W/ GRM STAIN [[id number]] Collected:  04/17/20 0825    Order Status:  Completed Specimen:  Other Updated:  04/17/20 1001    Anaerobic Culture [[id number]] Collected:  04/17/20 0825    Order Status:  Completed Specimen:  Other Updated:  04/17/20 0945    CULTURE TISSUE W/ GRM STAIN [[id number]] Collected:  04/17/20 0825    Order Status:  Completed Specimen:  Other Updated:  04/17/20 0945    BLOOD CULTURE [[id number]] Collected:  04/16/20 0957    Order Status:  Completed Specimen:  Blood from Peripheral Updated:  04/17/20 0827     Significant Indicator NEG     Source BLD     Site PERIPHERAL     Culture Result No Growth  Note: Blood cultures are incubated for 5 days and  are monitored continuously.Positive blood cultures  are called to the RN and reported as soon as  they are identified.      Narrative:       Per Hospital Policy: Only change Specimen Src: to \"Line\" if  specified by physician order.  Left AC    BLOOD CULTURE [[id number]] Collected:  04/16/20 0957    Order Status:  Completed Specimen:  Blood from Peripheral Updated:  04/17/20 0827     Significant Indicator NEG     Source BLD     Site PERIPHERAL     Culture Result No Growth  Note: Blood cultures are incubated for 5 days and  are monitored continuously.Positive blood cultures  are called to the RN and reported as soon as  they are identified.      Narrative:       Per " "Hospital Policy: Only change Specimen Src: to \"Line\" if  specified by physician order.  Right Hand    CULTURE WOUND W/ GRAM STAIN [[id number]]     Order Status:  No result Specimen:  Wound from Right Foot     MRSA By PCR (Amp) [[id number]]     Order Status:  No result Specimen:  Respirate from Nares               Imaging  DX-HIP-UNILATERAL-WITH PELVIS-1 VIEW LEFT   Final Result      1.  Unremarkable left hip series.      DX-FOOT-COMPLETE 3+ RIGHT   Final Result      Soft tissue swelling of the RIGHT great toe with gas present as well as a bony resorption of the distal tuft and interphalangeal joint consistent with osteomyelitis and septic arthritis.           Assessment/Plan  * Subacute osteomyelitis of right foot (HCC)  Assessment & Plan  Leukocytosis   setting of uncontrolled diabetes mellitus.  Dr. Robledo completed orthopedic surgery 4/17  Continue intravenous antibiotic therapy per ID.  Monitor surgical culture results.  Vital signs stable  Limb preservation service following.    Septic arthritis of right foot (HCC)  Assessment & Plan  Continue Unasyn and vancomycin, monitor.  Appreciate orthopedic surgery.    Type 2 diabetes mellitus with hyperglycemia, without long-term current use of insulin (HCC)  Assessment & Plan  Previous diagnosis, not currently on a medical regimen.  Plan for basal and prandial insulin dosing while here, it may be able to be discharged with oral therapy ultimately.  4/16 hemoglobin A1c: 8.6%    Acute hip pain, left  Assessment & Plan  Likely muscle spasms  Lidocaine patch  As needed baclofen started 4/16  Left hip x-ray negative for acute abnormalities.    Normocytic anemia  Assessment & Plan  Suspect chronic.  With no current evidence of bleed.  Transfuse if needed for hemoglobin less than 7 gm/dL         VTE prophylaxis: SCDs, restart pharmacological when surgical team clears.    CHINMAY Tomlinson.        "

## 2020-04-17 NOTE — ANESTHESIA TIME REPORT
Anesthesia Start and Stop Event Times     Date Time Event    4/17/2020 0720 Ready for Procedure     0738 Anesthesia Start     0904 Anesthesia Stop        Responsible Staff  04/17/20    Name Role Begin End    Jamison Maxwell III, M.D. Anesth 0738 0904        Preop Diagnosis (Free Text):  Pre-op Diagnosis     osteomylitis right great toe        Preop Diagnosis (Codes):    Post op Diagnosis  Osteomyelitis (HCC)      Premium Reason  Non-Premium    Comments:

## 2020-04-17 NOTE — OP REPORT
DATE OF SERVICE:  04/17/2020    PREOPERATIVE DIAGNOSES:  1.  Right first toe osteomyelitis.  2.  Right foot plantar foot ulcer under first metatarsophalangeal.  3.  Right Achilles contracture.    POSTOPERATIVE DIAGNOSES:  1.  Right first toe osteomyelitis.  2.  Right foot plantar foot ulcer under first metatarsophalangeal.  3.  Right Achilles contracture.    PROCEDURES:  1.  Right foot first ray amputation.  2.  Trimming of dystrophic callus, right foot under first metatarsal.  3.  Percutaneous tendo-Achilles lengthening.    SURGEON:  Breezy Robledo MD    ASSISTANT:  None.    ANESTHESIOLOGIST:  Jamison Maxwell MD    ANESTHESIA TYPE:  General.    SPECIMENS:  Cultures and great toe.    ESTIMATED BLOOD LOSS:  25 mL    COMPLICATIONS:  None.    OPERATIVE INDICATIONS:  The patient is a 45-year-old male who presents with   signs of osteomyelitis of his great toe.  He also has a history of contracture   of the right foot in lower extremity and a footdrop.  Radiograph demonstrated   resorption of the distal phalanx of the great toe.  He has a plantar foot   ulcer and symptomatic callus underlying the first and second metatarsal heads.    He has a purulent ulcer between the great and second toes.  He also has   limited dorsiflexion, which does not improve with flexion of the knee and an   equinus contracture.  Given these findings, he is an appropriately indicated   candidate for great toe versus first ray amputation and a tendo-Achilles   lengthening.  I discussed the risks, benefits, and alternatives with him   including risk of wound healing complications, infection, need for additional   surgery, and the medical risks of anesthesia including MI, stroke, death.  We   discussed benefits including improved chance of wound healing and treatment of   his infection.  I discussed alternatives including nonoperative management.    Informed consent was signed and documented.  I met with him preoperatively and   marked the  operative extremity.    OPERATIVE COURSE:  He underwent general anesthesia and was positioned supine.    All bony prominences were well padded.  The right lower extremity was prepped   and draped in sterile orthopedic fashion using an iodine prep given his open   wound and the surgical team scrubbed in.  A procedural pause was conducted to   verify correct patient, correct extremity, presence of the surgeon's initials   on the operative extremity, and administration of IV antibiotics, in this case   Ancef.  Following generalized agreement, a teardrop incision was made about   the base of the great toe.  This extended medially along the first ray,   sharply disarticulated the toe.  Given the location of his ulcer between the   toes and his plantar ulcer, insufficient soft tissue was available for closure   of simple toe amputation.  As such, we dissected subperiosteally under the   first metatarsal and performed a transverse osteotomy of the first metatarsal.    His plantar plate and excess tendons and tissue were resected sharply.  We   debrided and there was an area of necrotic ulceration near the base of the   second toe and debrided all unhealthy-appearing tissue with rongeurs, forceps,   scalpel, and cautery.  The debridement was excisional, deepest level of   debridement was fascia.  When that was complete, we thoroughly irrigated the   wound with copious amounts of normal saline and then closed with 2-0 Monocryl   and 2-0 nylon suture.  An ankle block was performed with Marcaine.  We then   turned our attention to the tendo-Achilles lengthening.  We performed three   step cuts in the Achilles, one medial and distal, one central and lateral and   one proximal and medial, each about an inch apart.  We achieved about 5 to 10   degrees of dorsiflexion with a tendo-Achilles lengthening.  I debated   additional lengthening effort; however, I did not want to rupture his   relatively ____ Achilles.  We closed these  wounds with simple nylon sutures.    A sterile dressing was applied.  The patient was placed in a splint and   returned to the recovery room in stable condition, sustaining no   complications.    POSTOPERATIVE PLAN:  1.  Nonweightbearing operative extremity.  2.  DVT prophylaxis, SCDs and chemical prophylaxis per medicine.  3.  Follow cultures.  Antibiotics per ID.  4.  Discharge planning.    During the procedure, cultures were obtained from the bone of the first   metatarsal head and the proximal phalanx to rule out more proximal infection.    The right toe was sent for permanent specimen.       ____________________________________     MD GIO Perez / NTS    DD:  04/17/2020 10:53:38  DT:  04/17/2020 14:42:45    D#:  [id number]  Job#:  [id number]
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
## 2020-04-17 NOTE — PROGRESS NOTES
Seen and examined.  Right great toe osteomyelitis and tight Achilles, no improvement with knee flexion.    Plan:  - To OR for R great toe, possible first ray amp, [place]  - NPO

## 2020-04-17 NOTE — PROGRESS NOTES
Assumed care at 0000. Pt. AOx4. Pt. Currently NPO. CHG completed before sending to OR. Call light within reach. Safety maintained. No issues or concerns. Will continue to monitor.

## 2020-04-17 NOTE — ANESTHESIA PROCEDURE NOTES
Airway  Date/Time: 4/17/2020 7:47 AM  Performed by: Jamison Maxwell III, M.D.  Authorized by: Jamison Maxwell III, M.D.     Location:  OR  Urgency:  Elective  Difficult Airway: No    Indications for Airway Management:  Anesthesia      Spontaneous Ventilation: absent    Sedation Level:  Deep  Preoxygenated: Yes    Final Airway Type:  Supraglottic airway  Final Supraglottic Airway:  Standard LMA  SGA Size:  4  Number of Attempts at Approach:  1

## 2020-04-17 NOTE — ANESTHESIA POSTPROCEDURE EVALUATION
Patient: Quoc Guillermo    Procedure Summary     Date:  04/17/20 Room / Location:  [place] [id number] / SURGERY [facility]    Anesthesia Start:  0738 Anesthesia Stop:  0904    Procedure:  AMPUTATION, TOE - GREAT TOE (Right Toe) Diagnosis:  (osteomylitis right great toe)    Surgeon:  Breezy Robledo M.D. Responsible Provider:  Jamison Maxwell III, M.D.    Anesthesia Type:  general ASA Status:  2          Final Anesthesia Type: general  Last vitals  BP   Blood Pressure: 104/66    Temp   36.2 °C (97.1 °F)    Pulse   Pulse: 65   Resp   16    SpO2   100 %      Anesthesia Post Evaluation    Patient location during evaluation: PACU  Patient participation: complete - patient participated  Level of consciousness: awake and alert  Pain score: 2    Airway patency: patent  Anesthetic complications: no  Cardiovascular status: hemodynamically stable  Respiratory status: acceptable  Hydration status: euvolemic    PONV: none           Nurse Pain Score: 2 (NPRS)
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
## 2020-04-17 NOTE — PROGRESS NOTES
Infectious diseases Follow-up Consult Note    Date of note:    4/17/2020      Consulting Physician: Breezy Robledo M.D.      Chief Complaint   Patient presents with   • Wound Check     existing wound diabetic foot wound on R great toe, seen at  for this friday, given IV antibiotics      History of Present Illness:   Quoc Guillermo is a 45 y.o. male admitted 4/15/2020 with right great toe pain and swelling since 1 week.  Patient states that he had a callus on his right foot since a few weeks.  Around Thursday/Friday last week, he noted subjective fevers, chills.  To feel better, he took a shower and soaked his foot when he noted that his callus fell off and since then had worsening pain and swelling.  Tried to soak his foot in Epsom salt with some medication at home and was wrapping it 3 times a day but as it was not getting better, he went to [place] where he was treated with IV antibiotics.  He was discharged on Monday with amoxicillin 500 mg 3 times daily.  Did not have any improvement in his pain with the antibiotics and thus presented to our ED.  Also noted a foul-smelling discharge.  He remains afebrile since admission.  Vital signs stable. Admission labs showed mild leukocytosis. X ray of foot showed soft tissue swelling of the right great toe with gas as well as bony resorption consistent with osteomyelitis and septic arthritis.    Interim Events:  No acute overnight events  Patient just came back from the OR  Complains of mild pain but otherwise no other complaints  Leukocytosis improving    ROS  Constitutional: Positive for malaise/fatigue. Negative for chills and fever.   HENT: Negative for hearing loss.    Eyes: Negative for blurred vision.   Respiratory: Negative for cough and shortness of breath.    Cardiovascular: Negative for chest pain.   Gastrointestinal: Negative for abdominal pain, nausea and vomiting.   Musculoskeletal: Positive for joint pain.   Skin: Negative for rash.   Neurological:  Negative for dizziness.     Past medical, surgical, social, and family history reviewed and unchanged from admission except as noted in assessment and plan.    Medications: Reviewed in MAR  Current Facility-Administered Medications   Medication Dose Frequency Provider Last Rate Last Dose   • acetaminophen (TYLENOL) tablet 650 mg  650 mg Q6HRS PRN Ld Figueroa M.D.       • Pharmacy Consult Request ...Pain Management Review 1 Each  1 Each PHARMACY TO DOSE Ld Figueroa M.D.        And   • oxyCODONE immediate-release (ROXICODONE) tablet 2.5 mg  2.5 mg Q3HRS PRN Ld Figueroa M.D.        And   • oxyCODONE immediate-release (ROXICODONE) tablet 5 mg  5 mg Q3HRS PRN Ld Figueroa M.D.   5 mg at 04/16/20 0152    And   • morphine (pf) 4 MG/ML injection 2 mg  2 mg Q3HRS PRN Ld Figueroa M.D.   2 mg at 04/17/20 1403   • cloNIDine (CATAPRES) tablet 0.1 mg  0.1 mg Q6HRS PRN Ld Figueroa M.D.       • ondansetron (ZOFRAN) syringe/vial injection 4 mg  4 mg Q4HRS PRSARAH Figueroa M.D.       • ondansetron (ZOFRAN ODT) dispertab 4 mg  4 mg Q4HRS PRSARAH Figueroa M.D.       • promethazine (PHENERGAN) tablet 12.5-25 mg  12.5-25 mg Q4HRS PRSARAH Figueroa M.D.       • promethazine (PHENERGAN) suppository 12.5-25 mg  12.5-25 mg Q4HRS PRSARAH Figueroa M.D.       • prochlorperazine (COMPAZINE) injection 5-10 mg  5-10 mg Q4HRS PRN Ld Figueroa M.D.       • senna-docusate (PERICOLACE or SENOKOT S) 8.6-50 MG per tablet 2 Tab  2 Tab BID Ld Figueroa M.D.   Stopped at 04/17/20 0600    And   • polyethylene glycol/lytes (MIRALAX) PACKET 1 Packet  1 Packet QDAY PRN Ld Figueroa M.D.        And   • magnesium hydroxide (MILK OF MAGNESIA) suspension 30 mL  30 mL QDAY PRN Ld Figueroa M.D.        And   • bisacodyl (DULCOLAX) suppository 10 mg  10 mg QDAY PRN Ld Figueroa M.D.       • lactated ringers infusion   Continuous Ld Figueroa M.D.   Stopped at 04/17/20 0817   • insulin glargine (LANTUS) injection 8  "Units  0.1 Units/kg/day Q EVENING Ld Figueroa M.D.   8 Units at 04/16/20 1732    And   • insulin lispro (HUMALOG) injection 1-6 Units  1-6 Units Q6HRS Ld Figueroa M.D.   2 Units at 04/17/20 1357    And   • glucose 4 g chewable tablet 16 g  16 g Q15 MIN PRN Ld Figueroa M.D.        And   • dextrose 50% (D50W) injection 50 mL  50 mL Q15 MIN PRN Ld Figueroa M.D.       • ampicillin/sulbactam (UNASYN) 3 g in  mL IVPB  3 g Q6HRS Ld Figueroa M.D.   Stopped at 04/17/20 1348   • MD Alert...Vancomycin per Pharmacy   PHARMACY TO DOSE Ld Figueroa M.D.       • vancomycin (VANCOCIN) 900 mg in  mL IVPB  12 mg/kg Q8HR Ld Figueroa M.D.   Stopped at 04/17/20 0200   • lidocaine (LIDODERM) 5 % 1 Patch  1 Patch Q24HR PASCUAL TomlinsonPIrisRIrisNIris   1 Patch at 04/16/20 1725   • baclofen (LIORESAL) tablet 10 mg  10 mg TID PRN PASCUAL TomlinsonP.R.NIris       Last reviewed on 4/16/2020 11:17 AM by Hever Menezes    No Known Allergies    Physical Exam  Body mass index is 25.34 kg/m². /68   Pulse 68   Temp 36.4 °C (97.5 °F) (Temporal)   Resp 16   Ht 1.727 m (5' 8\")   Wt 75.6 kg (166 lb 10.7 oz)   SpO2 100%    Vitals:    04/17/20 1110 04/17/20 1140 04/17/20 1210 04/17/20 1240   BP: 117/70 110/71 104/66 107/68   Pulse: 71 72 65 68   Resp: 17 16 16 16   Temp: 36.2 °C (97.2 °F) 36.4 °C (97.5 °F) 36.2 °C (97.1 °F) 36.4 °C (97.5 °F)   TempSrc: Temporal Temporal Temporal Temporal   SpO2: 98% 98% 100% 100%   Weight:       Height:        Oxygen Therapy:  Pulse Oximetry: 100 %, O2 (LPM): 2, O2 Delivery Device: Silicone Nasal Cannula    General: well nourished, no diaphoresis, well-appearing, no acute distress  HEENT: sclera anicteric, PERRL, extraocular muscles intact, mucous membranes moist, oropharynx clear and moist, no oral lesions or exudate  Neck: supple, no lymphadenopathy  Chest: CTAB, no rales, rhonchi or wheezes, normal work of breathing.  Cardiac: regular rate and rhythm, normal S1 S2, no " murmurs, rubs or gallops  Abdomen: + bowel sounds, soft, non-tender, non-distended, no hepatosplenomegaly  Extremities:  Right foot bandaged   neuro: Alert and oriented times 3, non-focal exam, speech fluent, full range of motion to bilateral upper and lower extremities    Labs (personally reviewed and notable for):   Recent Results (from the past 24 hour(s))   ACCU-CHEK GLUCOSE    Collection Time: 04/16/20  5:29 PM   Result Value Ref Range    Glucose - Accu-Ck 199 (H) 65 - 99 mg/dL   ACCU-CHEK GLUCOSE    Collection Time: 04/16/20 11:53 PM   Result Value Ref Range    Glucose - Accu-Ck 157 (H) 65 - 99 mg/dL   Basic Metabolic Panel (BMP)    Collection Time: 04/17/20 12:55 AM   Result Value Ref Range    Sodium 134 (L) 135 - 145 mmol/L    Potassium 4.1 3.6 - 5.5 mmol/L    Chloride 100 96 - 112 mmol/L    Co2 23 20 - 33 mmol/L    Glucose 172 (H) 65 - 99 mg/dL    Bun 14 8 - 22 mg/dL    Creatinine 0.70 0.50 - 1.40 mg/dL    Calcium 8.6 8.5 - 10.5 mg/dL    Anion Gap 11.0 7.0 - 16.0   CBC with Differential    Collection Time: 04/17/20 12:55 AM   Result Value Ref Range    WBC 11.2 (H) 4.8 - 10.8 K/uL    RBC 4.12 (L) 4.70 - 6.10 M/uL    Hemoglobin 11.7 (L) 14.0 - 18.0 g/dL    Hematocrit 34.2 (L) 42.0 - 52.0 %    MCV 83.0 81.4 - 97.8 fL    MCH 28.4 27.0 - 33.0 pg    MCHC 34.2 33.7 - 35.3 g/dL    RDW 38.4 35.9 - 50.0 fL    Platelet Count 328 164 - 446 K/uL    MPV 10.1 9.0 - 12.9 fL    Neutrophils-Polys 68.20 44.00 - 72.00 %    Lymphocytes 19.70 (L) 22.00 - 41.00 %    Monocytes 7.70 0.00 - 13.40 %    Eosinophils 2.90 0.00 - 6.90 %    Basophils 0.40 0.00 - 1.80 %    Immature Granulocytes 1.10 (H) 0.00 - 0.90 %    Nucleated RBC 0.00 /100 WBC    Neutrophils (Absolute) 7.64 (H) 1.82 - 7.42 K/uL    Lymphs (Absolute) 2.21 1.00 - 4.80 K/uL    Monos (Absolute) 0.86 (H) 0.00 - 0.85 K/uL    Eos (Absolute) 0.32 0.00 - 0.51 K/uL    Baso (Absolute) 0.05 0.00 - 0.12 K/uL    Immature Granulocytes (abs) 0.12 (H) 0.00 - 0.11 K/uL    NRBC (Absolute)  0.00 K/uL   ESTIMATED GFR    Collection Time: 04/17/20 12:55 AM   Result Value Ref Range    GFR If African American >60 >60 mL/min/1.73 m 2    GFR If Non African American >60 >60 mL/min/1.73 m 2   ACCU-CHEK GLUCOSE    Collection Time: 04/17/20  5:29 AM   Result Value Ref Range    Glucose - Accu-Ck 190 (H) 65 - 99 mg/dL   Histology Request    Collection Time: 04/17/20 10:07 AM   Result Value Ref Range    Pathology Request Sent to Histo    ACCU-CHEK GLUCOSE    Collection Time: 04/17/20 11:11 AM   Result Value Ref Range    Glucose - Accu-Ck 218 (H) 65 - 99 mg/dL           ASSESSMENT & PLAN    #Right great toe osteomyelitis  -Vital signs stable, leukocytosis improving  -Status post right foot first ray amputation 4/16  -Continue vancomycin and Unasyn  -Monitor renal function  -Follow-up intraoperative cultures   -We will taper antibiotics based on culture results, duration of antibiotics based on extent of surgery    -Blood culture showed no growth to date, follow-up final blood cultures     #Newly diagnosed diabetes mellitus  -A1c 8.6  -Needs tight control of his blood sugars  -Management per primary team    It is my pleasure to participate in the care of Mr. Guillermo.  Please do not hesitate to contact me with questions or concerns.
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
## 2020-04-17 NOTE — ANESTHESIA PREPROCEDURE EVALUATION
Relevant Problems   ENDO   (+) Type 2 diabetes mellitus with hyperglycemia, without long-term current use of insulin (HCC)      Other   (+) Septic arthritis of right foot (HCC)   (+) Subacute osteomyelitis of right foot (HCC)       Physical Exam    Airway   Mallampati: II  TM distance: >3 FB  Neck ROM: full       Cardiovascular - normal exam  Rhythm: regular  Rate: normal  (-) murmur     Dental - normal exam         Pulmonary - normal exam  Breath sounds clear to auscultation     Abdominal    Neurological - normal exam         Other findings: Poor dentition            Anesthesia Plan    ASA 2       Plan - general       Airway plan will be LMA        Induction: intravenous    Postoperative Plan: Postoperative administration of opioids is intended.    Pertinent diagnostic labs and testing reviewed    Informed Consent:    Anesthetic plan and risks discussed with patient.    Use of blood products discussed with: patient whom consented to blood products.

## 2020-04-17 NOTE — PROGRESS NOTES
Report received from day shift nurse. Assumed care @ 1900.     Patient is alert and oriented x 4. Able to make needs known. Assessment partially completed. On room air, tolerating well. Dressing noted on right foot as endorsed, CDI. Denies any pain and discomfort at this time. Patient educated regarding plan of care. SB assist. On IV antibiotics. For NPO at midnight, pt aware.    Bed locked, in lowest position, treaded sock on. Needs attended. No further needs at this time. Communication board updated. Call light and personal belongings within reach.

## 2020-04-17 NOTE — ANESTHESIA QCDR
2019 [facility] Clinical Data Registry (for Quality Improvement)     Postoperative nausea/vomiting risk protocol (Adult = 18 yrs and Pediatric 3-17 yrs)- (430 and 463)  General inhalation anesthetic (NOT TIVA) with PONV risk factors: Yes  Provision of anti-emetic therapy with at least 2 different classes of agents: Yes   Patient DID NOT receive anti-emetic therapy and reason is documented in Medical Record:  N/A    Multimodal Pain Management- (477)  Non-emergent surgery AND patient age >= 18: No  Use of Multimodal Pain Management, two or more drugs and/or interventions, NOT including systemic opioids:   Exception: Documented allergy to multiple classes of analgesics:     Smoking Abstinence (404)  Patient is current smoker (cigarette, pipe, e-cig, marijuanna): No  Elective Surgery:   Abstinence instructions provided prior to day of surgery:   Patient abstained from smoking on day of surgery:     Pre-Op Beta-Blocker in Isolated CABG (44)  Isolated CABG AND patient age >= 18: No  Beta-blocker admin within 24 hours of surgical incision:   Exception:of medical reason(s) for not administering beta blocker within 24 hours prior to surgical incision (e.g., not  indicated,other medical reason):     PACU assessment of acute postoperative pain prior to Anesthesia Care End- Applies to Patients Age = 18- (ABG7)  Initial PACU pain score is which of the following: < 7/10  Patient unable to report pain score: N/A    Post-anesthetic transfer of care checklist/protocol to PACU/ICU- (426 and 427)  Upon conclusion of case, patient transferred to which of the following locations: PACU/Non-ICU  Use of transfer checklist/protocol: Yes  Exclusion: Service Performed in Patient Hospital Room (and thus did not require transfer): N/A  Unplanned admission to ICU related to anesthesia service up through end of PACU care- (MD51)  Unplanned admission to ICU (not initially anticipated at anesthesia start time): No

## 2020-04-17 NOTE — PROGRESS NOTES
"Pharmacy Kinetics 45 y.o. male on vancomycin day # 2 2020    Currently on Vancomycin 900 mg iv q8hr  Provider specified end date: TBD    Indication for Treatment: Osteomyelitis    Pertinent history per medical record: Admitted on 4/15/2020 for right great toe pain, swelling, and discharge. Patient noticed a callus on his right great toe approx. 1 week prior to admission. Patient was seen at [place] and discharged on amoxicillin with no clinical improvement. Patient with recent diagnosis of type 2 diabetes. Surgery consulted and amputation was performed on . RID following and recommending to continue Unasyn/Vanco.    Other antibiotics: Unasyn 3 g IV q6h    Allergies: Patient has no known allergies.     List concerns for renal function: none at this time    Pertinent cultures to date:    BCx x 2 NGTD    MRSA nares swab if pneumonia is a concern (ordered/positive/negative/n-a): n/a    Recent Labs     04/15/20  2310 20  0055   WBC 12.4* 11.2*   NEUTSPOLYS 82.30* 68.20     Recent Labs     04/15/20  2310 20  0055   BUN 20 14   CREATININE 0.87 0.70     No results for input(s): VANCOTROUGH, VANCOPEAK, VANCORANDOM in the last 72 hours.    Intake/Output Summary (Last 24 hours) at 2020 1459  Last data filed at 2020 1220  Gross per 24 hour   Intake 940 ml   Output 1225 ml   Net -285 ml      /68   Pulse 68   Temp 36.4 °C (97.5 °F) (Temporal)   Resp 16   Ht 1.727 m (5' 8\")   Wt 75.6 kg (166 lb 10.7 oz)   SpO2 100%  Temp (24hrs), Av.5 °C (97.7 °F), Min:36.2 °C (97.1 °F), Max:37.3 °C (99.2 °F)      A/P   1. Vancomycin dose change: continue with vancomycin 900 mg IV q8h  2. Next vancomycin level: ~ 1 to 2 days pending when vancomycin re-initiated s/p surgery   3. Goal trough: 12 - 16 mcg/mL  4. Comments: Vancomycin continues s/p surgery pending margins from surgery. ID on board. Renal function remains stable. No true concerns for accumulation at this time. Level not collected " today as patient was in surgery, will plan on obtaining a level tomorrow or the next day pending vancomycin resumption. Pharmacy will continue to monitor.    Thank you!    Galina Garcia, PharmD, BCPS

## 2020-04-18 LAB
ANION GAP SERPL CALC-SCNC: 12 MMOL/L (ref 7–16)
BUN SERPL-MCNC: 18 MG/DL (ref 8–22)
CALCIUM SERPL-MCNC: 8.7 MG/DL (ref 8.5–10.5)
CHLORIDE SERPL-SCNC: 95 MMOL/L (ref 96–112)
CO2 SERPL-SCNC: 22 MMOL/L (ref 20–33)
CREAT SERPL-MCNC: 0.85 MG/DL (ref 0.5–1.4)
ERYTHROCYTE [DISTWIDTH] IN BLOOD BY AUTOMATED COUNT: 37.2 FL (ref 35.9–50)
GLUCOSE BLD-MCNC: 167 MG/DL (ref 65–99)
GLUCOSE BLD-MCNC: 260 MG/DL (ref 65–99)
GLUCOSE BLD-MCNC: 291 MG/DL (ref 65–99)
GLUCOSE BLD-MCNC: 323 MG/DL (ref 65–99)
GLUCOSE SERPL-MCNC: 382 MG/DL (ref 65–99)
HCT VFR BLD AUTO: 34.2 % (ref 42–52)
HGB BLD-MCNC: 11.7 G/DL (ref 14–18)
MCH RBC QN AUTO: 28.2 PG (ref 27–33)
MCHC RBC AUTO-ENTMCNC: 34.2 G/DL (ref 33.7–35.3)
MCV RBC AUTO: 82.4 FL (ref 81.4–97.8)
PLATELET # BLD AUTO: 375 K/UL (ref 164–446)
PMV BLD AUTO: 9.9 FL (ref 9–12.9)
POTASSIUM SERPL-SCNC: 4.5 MMOL/L (ref 3.6–5.5)
RBC # BLD AUTO: 4.15 M/UL (ref 4.7–6.1)
SODIUM SERPL-SCNC: 129 MMOL/L (ref 135–145)
VANCOMYCIN TROUGH SERPL-MCNC: 11.6 UG/ML (ref 10–20)
WBC # BLD AUTO: 16.2 K/UL (ref 4.8–10.8)

## 2020-04-18 PROCEDURE — 80202 ASSAY OF VANCOMYCIN: CPT

## 2020-04-18 PROCEDURE — 700105 HCHG RX REV CODE 258: Performed by: HOSPITALIST

## 2020-04-18 PROCEDURE — 82962 GLUCOSE BLOOD TEST: CPT | Mod: 91

## 2020-04-18 PROCEDURE — 700102 HCHG RX REV CODE 250 W/ 637 OVERRIDE(OP): Performed by: HOSPITALIST

## 2020-04-18 PROCEDURE — 700111 HCHG RX REV CODE 636 W/ 250 OVERRIDE (IP): Performed by: INTERNAL MEDICINE

## 2020-04-18 PROCEDURE — 700102 HCHG RX REV CODE 250 W/ 637 OVERRIDE(OP): Performed by: INTERNAL MEDICINE

## 2020-04-18 PROCEDURE — 700105 HCHG RX REV CODE 258: Performed by: INTERNAL MEDICINE

## 2020-04-18 PROCEDURE — 97165 OT EVAL LOW COMPLEX 30 MIN: CPT

## 2020-04-18 PROCEDURE — 700111 HCHG RX REV CODE 636 W/ 250 OVERRIDE (IP): Performed by: HOSPITALIST

## 2020-04-18 PROCEDURE — 85027 COMPLETE CBC AUTOMATED: CPT

## 2020-04-18 PROCEDURE — 80048 BASIC METABOLIC PNL TOTAL CA: CPT

## 2020-04-18 PROCEDURE — 36415 COLL VENOUS BLD VENIPUNCTURE: CPT

## 2020-04-18 PROCEDURE — 770006 HCHG ROOM/CARE - MED/SURG/GYN SEMI*

## 2020-04-18 PROCEDURE — 700101 HCHG RX REV CODE 250: Performed by: NURSE PRACTITIONER

## 2020-04-18 PROCEDURE — 99232 SBSQ HOSP IP/OBS MODERATE 35: CPT | Performed by: INTERNAL MEDICINE

## 2020-04-18 PROCEDURE — 99233 SBSQ HOSP IP/OBS HIGH 50: CPT | Performed by: INTERNAL MEDICINE

## 2020-04-18 PROCEDURE — A9270 NON-COVERED ITEM OR SERVICE: HCPCS | Performed by: HOSPITALIST

## 2020-04-18 PROCEDURE — 97161 PT EVAL LOW COMPLEX 20 MIN: CPT

## 2020-04-18 RX ADMIN — LIDOCAINE 1 PATCH: 50 PATCH TOPICAL at 17:36

## 2020-04-18 RX ADMIN — SENNOSIDES AND DOCUSATE SODIUM 2 TABLET: 8.6; 5 TABLET ORAL at 17:31

## 2020-04-18 RX ADMIN — AMPICILLIN SODIUM AND SULBACTAM SODIUM 3 G: 2; 1 INJECTION, POWDER, FOR SOLUTION INTRAMUSCULAR; INTRAVENOUS at 08:12

## 2020-04-18 RX ADMIN — INSULIN HUMAN 5 UNITS: 100 INJECTION, SOLUTION PARENTERAL at 18:11

## 2020-04-18 RX ADMIN — OXYCODONE HYDROCHLORIDE 5 MG: 5 TABLET ORAL at 17:31

## 2020-04-18 RX ADMIN — MORPHINE SULFATE 2 MG: 4 INJECTION INTRAVENOUS at 21:00

## 2020-04-18 RX ADMIN — AMPICILLIN SODIUM AND SULBACTAM SODIUM 3 G: 2; 1 INJECTION, POWDER, FOR SOLUTION INTRAMUSCULAR; INTRAVENOUS at 04:26

## 2020-04-18 RX ADMIN — VANCOMYCIN HYDROCHLORIDE 900 MG: 500 INJECTION, POWDER, LYOPHILIZED, FOR SOLUTION INTRAVENOUS at 17:19

## 2020-04-18 RX ADMIN — AMPICILLIN SODIUM AND SULBACTAM SODIUM 3 G: 2; 1 INJECTION, POWDER, FOR SOLUTION INTRAMUSCULAR; INTRAVENOUS at 15:51

## 2020-04-18 RX ADMIN — VANCOMYCIN HYDROCHLORIDE 900 MG: 500 INJECTION, POWDER, LYOPHILIZED, FOR SOLUTION INTRAVENOUS at 09:16

## 2020-04-18 RX ADMIN — OXYCODONE HYDROCHLORIDE 5 MG: 5 TABLET ORAL at 11:27

## 2020-04-18 RX ADMIN — INSULIN HUMAN 2 UNITS: 100 INJECTION, SOLUTION PARENTERAL at 12:52

## 2020-04-18 RX ADMIN — AMPICILLIN SODIUM AND SULBACTAM SODIUM 3 G: 2; 1 INJECTION, POWDER, FOR SOLUTION INTRAMUSCULAR; INTRAVENOUS at 21:01

## 2020-04-18 RX ADMIN — INSULIN LISPRO 3 UNITS: 100 INJECTION, SOLUTION INTRAVENOUS; SUBCUTANEOUS at 05:28

## 2020-04-18 RX ADMIN — INSULIN GLARGINE 8 UNITS: 100 INJECTION, SOLUTION SUBCUTANEOUS at 18:11

## 2020-04-18 RX ADMIN — INSULIN LISPRO 4 UNITS: 100 INJECTION, SOLUTION INTRAVENOUS; SUBCUTANEOUS at 01:06

## 2020-04-18 RX ADMIN — VANCOMYCIN HYDROCHLORIDE 900 MG: 500 INJECTION, POWDER, LYOPHILIZED, FOR SOLUTION INTRAVENOUS at 01:06

## 2020-04-18 RX ADMIN — INSULIN HUMAN 5 UNITS: 100 INJECTION, SOLUTION PARENTERAL at 20:53

## 2020-04-18 ASSESSMENT — COGNITIVE AND FUNCTIONAL STATUS - GENERAL
SUGGESTED CMS G CODE MODIFIER MOBILITY: CJ
SUGGESTED CMS G CODE MODIFIER DAILY ACTIVITY: CJ
DAILY ACTIVITIY SCORE: 22
DRESSING REGULAR LOWER BODY CLOTHING: A LITTLE
HELP NEEDED FOR BATHING: A LITTLE
MOBILITY SCORE: 20
MOVING FROM LYING ON BACK TO SITTING ON SIDE OF FLAT BED: A LITTLE
CLIMB 3 TO 5 STEPS WITH RAILING: A LOT
WALKING IN HOSPITAL ROOM: A LITTLE

## 2020-04-18 ASSESSMENT — ENCOUNTER SYMPTOMS
RESPIRATORY NEGATIVE: 1
DIZZINESS: 0
GASTROINTESTINAL NEGATIVE: 1
HEADACHES: 0
WEAKNESS: 1
DIARRHEA: 0
ABDOMINAL PAIN: 0
VOMITING: 0
COUGH: 0
CHILLS: 0
PSYCHIATRIC NEGATIVE: 1
NAUSEA: 0
CARDIOVASCULAR NEGATIVE: 1
FEVER: 0
SEIZURES: 0
EYES NEGATIVE: 1
FOCAL WEAKNESS: 0
MYALGIAS: 1
SHORTNESS OF BREATH: 0

## 2020-04-18 ASSESSMENT — GAIT ASSESSMENTS
DISTANCE (FEET): 80
ASSISTIVE DEVICE: FRONT WHEEL WALKER
GAIT LEVEL OF ASSIST: SUPERVISED

## 2020-04-18 ASSESSMENT — ACTIVITIES OF DAILY LIVING (ADL): TOILETING: INDEPENDENT

## 2020-04-18 NOTE — PROGRESS NOTES
Diabetes education: Met with pt this afternoon. Please see consult note.  Plan: CDE will follow up on Monday as not sure what he will need at discharge. Meter would be True Metrix and insulin if needed would be Admelog vial or pen and Basaglar kwik pen as well as pen needles.
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
## 2020-04-18 NOTE — THERAPY
"Physical Therapy Evaluation completed.     Bed Mobility:  Supine to Sit: Supervised  Transfers: Sit to Stand: Supervised  Gait: Level Of Assist: Supervised with Front-Wheel Walker       Plan of Care: Patient with no further skilled PT needs in the acute care setting at this time  Discharge Recommendations: Equipment: Front-Wheel Walker. Post-acute therapy Discharge to home with outpatient or home health for additional skilled therapy services.    See \"Rehab Therapy-Acute\" Patient Summary Report for complete documentation.     45 year old male w presents s/p R foot first ray amputation, trimming of dystrophic callus on R foot under 1st metatarsal, precutaneous tendo-achilles lengthening.  PMHx includes DM, normocytic anemia.  Patient also currently has left hip pain from unknown origin.  While patient has deficits in strength, ROM, balance and endurance, patient demonstrates safe enough functional mobility to go home.  Patient lives with girlfriend who is supportive.  Recommend outpatient physical therapy when appropriate.      Alban Prieto   "

## 2020-04-18 NOTE — PROGRESS NOTES
"Pharmacy Kinetics 45 y.o. male on vancomycin day # 3    2020    Currently on Vancomycin 900 mg iv q8hr  (0100 0900 1700)  Provider specified end date: TBD    Indication for Treatment: Osteo    Pertinent history per medical record: Admitted on 4/15/2020 for Osteomyelitis. 45 y.o. male w/ right great toe pain and swelling x1 wk. Patient states that he had a callus on his right foot for a few weeks. Last week, he noted subjective fevers, chills. He took a shower and soaked his foot and noted that his callus fell off, since then increased pain and swelling. Went to [facility] where he was treated with IV antibiotics. He was discharged with amoxicillin 500 mg 3 times daily.  Did not have any improvement, noted a foul-smelling discharge.   X ray of foot showed soft tissue swelling of the right great toe with gas as well as bony resorption consistent with osteomyelitis and septic arthritis.    Other antibiotics: Unasyn 3g iv q6h    Allergies: Patient has no known allergies.     List concerns for renal function: None at this time    Pertinent cultures to date:   20:PBCx2:NGTD  20:Rt 1st metatarsal head,BONE:NGTD  20:Rt great toe proximal phalan,TISS:NGTD       MRSA nares swab if pneumonia is a concern (ordered/positive/negative/n-a): NA     Recent Labs     04/15/20  2310 04/17/20  0055 20  0012   WBC 12.4* 11.2* 16.2*   NEUTSPOLYS 82.30* 68.20  --      Recent Labs     04/15/20  2310 04/17/20  0055 20  0012   BUN 20 14 18   CREATININE 0.87 0.70 0.85     No results for input(s): VANCOTROUGH, VANCOPEAK, VANCORANDOM in the last 72 hours.    Intake/Output Summary (Last 24 hours) at 2020 0831  Last data filed at 2020 0440  Gross per 24 hour   Intake 700 ml   Output 3200 ml   Net -2500 ml      /64   Pulse 60   Temp 36.6 °C (97.8 °F) (Temporal)   Resp 16   Ht 1.727 m (5' 8\")   Wt 75.6 kg (166 lb 10.7 oz)   SpO2 98%  Temp (24hrs), Av.5 °C (97.7 °F), Min:36.2 °C (97.1 °F), " Max:37.3 °C (99.2 °F)      A/P   1. Vancomycin dose change: No change   2. Next vancomycin level: 04/18/20@1630  3. Goal trough: 12-16 mcg/mL   4. Comments: Leukocytosis. Afebrile, CX NGTD. Renal indices stable , level ordered today. ID following , continue abx regimen. Level today.     Roshan Cardenas PharmD BCPS     Update:        4/18/2020 16:17   Vancomycin Trough 11.6     A/P   5. Vancomycin dose change: No change   6. Next vancomycin level: ~3 days   7. Goal trough: 12-16 mcg/mL   8. Comments: Level at goal , continue current dose.     Roshan Cardenas PharmD BCPS

## 2020-04-18 NOTE — CARE PLAN
Problem: Communication  Goal: The ability to communicate needs accurately and effectively will improve  Outcome: PROGRESSING AS EXPECTED     Problem: Mobility  Goal: Risk for activity intolerance will decrease  Outcome: PROGRESSING AS EXPECTED      Patient ambulated after surgery and is calling when needing assistance.

## 2020-04-18 NOTE — PROGRESS NOTES
Spoke with Kilo Dumont about timing of antibiotics. She said to go ahead and hang the Unasyn even though it is before the 6 hour order, it is safe to hang and will get back on the schedule for medication timing.

## 2020-04-18 NOTE — PROGRESS NOTES
"Hospital Medicine Daily Progress Note    Date of Service  4/18/2020    Chief Complaint  45 y.o. male admitted 4/15/2020 with RIGHT food discharge    Hospital Course    Mr. Guillermo, \"Jevon", is a 45 y.o. male with history of diabetes mellitus for which he has never received treatment, was in his usual state of health until approximately 6 days prior to admission.  He began to notice a callus on his right foot just by his great toe.  He reports no pain associated with a callus, and has limited pain in his feet.  He reports some fever and chills, and noticed on the day prior to admission, that his foot began to have discharge while he was in the shower.  He also noted the callus or at least part of the callus to have fallen off.  He reports that the discharge was foul-smelling. He also has 8/10 sharp, nonradiating left hip pain exacerbated by medial and lateral rotation of the leg and palpation.  He took muscle relaxers outpatient which helped the pain.  This problem started at the same time the right foot callus came off, approximately 1 week ago. He states he has full sensation in all extremities.  He states he had a pin surgically implanted when he was approximately 12 to 14 years old to correct his contracted first toe on his right foot.  He has been told he has high blood sugars, however has not been told he has diabetes prior to this hospitalization.  Patient admitted for further evaluation and treatment.            Interval Problem Update  -Patient seen and examined; patient working with PT/OT and utilizing the walker; he reports tenderness over right foot along with LEFT thigh tenderness; patient instructed NOT to bear any weight on RIGHT foot until further instructed; patient aware in plan of care.  -Plan: Continue to work with PT/OT; pain control; needs diabetic education; patient being followed by Orthopedic surgery, LPS, and ID.  POD#1: by Dr. Robledo on 4/17  1.  Right foot first ray amputation.  2.  " Trimming of dystrophic callus, right foot under first metatarsal.  3.  Percutaneous tendo-Achilles lengthening.  Per ID recommendations - Dr. Salgado  Continue with Vancomycin and Unasyn, monitor renal function, f/u intraop cultures, control blood sugars  Per Diabetic Educator:   F/u on Monday with patient; will need diabetes supplies when d/c  LPS recommendation: following patient throughout hospital stay; concur with ID and Orthopedics  -Lab work: unremarkable  -VSS at this time  -Ordered CBC and CMP for am    Consultants/Specialty  -Orthopedic Surgery  -LPS  -ID    Code Status  FULL    Disposition  TBD    Review of Systems  Review of Systems   Constitutional: Positive for malaise/fatigue. Negative for chills and fever.   HENT: Negative.    Eyes: Negative.    Respiratory: Negative.    Cardiovascular: Negative.    Gastrointestinal: Negative.    Genitourinary: Negative.    Musculoskeletal: Positive for joint pain and myalgias.   Skin: Negative.    Neurological: Positive for weakness. Negative for focal weakness and seizures.   Endo/Heme/Allergies: Negative.    Psychiatric/Behavioral: Negative.         Physical Exam  Temp:  [36.2 °C (97.1 °F)-37.2 °C (98.9 °F)] 36.6 °C (97.8 °F)  Pulse:  [60-72] 60  Resp:  [15-18] 16  BP: (104-123)/(59-71) 123/64  SpO2:  [94 %-100 %] 98 %    Physical Exam  Vitals signs and nursing note reviewed.   Constitutional:       Appearance: Normal appearance.   HENT:      Head: Normocephalic.      Nose: Nose normal.      Mouth/Throat:      Mouth: Mucous membranes are moist.   Eyes:      Pupils: Pupils are equal, round, and reactive to light.   Neck:      Musculoskeletal: Normal range of motion.   Cardiovascular:      Rate and Rhythm: Normal rate and regular rhythm.      Pulses: Normal pulses.      Heart sounds: Normal heart sounds.   Pulmonary:      Effort: Pulmonary effort is normal.      Breath sounds: Normal breath sounds.   Abdominal:      General: Abdomen is flat. Bowel sounds are normal.       Palpations: Abdomen is soft.   Musculoskeletal:         General: Swelling, tenderness and signs of injury present.      Right lower leg: Edema present.   Skin:     General: Skin is warm and dry.      Capillary Refill: Capillary refill takes 2 to 3 seconds.   Neurological:      Mental Status: He is alert. Mental status is at baseline.         Fluids    Intake/Output Summary (Last 24 hours) at 4/18/2020 1029  Last data filed at 4/18/2020 0440  Gross per 24 hour   Intake 600 ml   Output 3200 ml   Net -2600 ml       Laboratory  Recent Labs     04/15/20  2310 04/17/20  0055 04/18/20  0012   WBC 12.4* 11.2* 16.2*   RBC 4.90 4.12* 4.15*   HEMOGLOBIN 13.8* 11.7* 11.7*   HEMATOCRIT 41.0* 34.2* 34.2*   MCV 83.7 83.0 82.4   MCH 28.2 28.4 28.2   MCHC 33.7 34.2 34.2   RDW 38.5 38.4 37.2   PLATELETCT 284 328 375   MPV 10.6 10.1 9.9     Recent Labs     04/15/20  2310 04/17/20  0055 04/18/20  0012   SODIUM 133* 134* 129*   POTASSIUM 3.9 4.1 4.5   CHLORIDE 99 100 95*   CO2 20 23 22   GLUCOSE 237* 172* 382*   BUN 20 14 18   CREATININE 0.87 0.70 0.85   CALCIUM 8.6 8.6 8.7                   Imaging  DX-HIP-UNILATERAL-WITH PELVIS-1 VIEW LEFT   Final Result      1.  Unremarkable left hip series.      DX-FOOT-COMPLETE 3+ RIGHT   Final Result      Soft tissue swelling of the RIGHT great toe with gas present as well as a bony resorption of the distal tuft and interphalangeal joint consistent with osteomyelitis and septic arthritis.           Assessment/Plan  * Subacute osteomyelitis of right foot (HCC)  Assessment & Plan  -Leukocytosis   - uncontrolled diabetes mellitus.    -Dr. Robledo completed orthopedic surgery 4/17  -POD#1:  1.  Right foot first ray amputation.  2.  Trimming of dystrophic callus, right foot under first metatarsal.  3.  Percutaneous tendo-Achilles lengthening.  -Continue intravenous antibiotic therapy per ID : Vanco and Unasyn    -Monitor surgical culture results.  -Limb preservation service following.    Septic  arthritis of right foot (HCC)  Assessment & Plan  -Continue Unasyn and vancomycin, monitor.   -Orthopedics and LPS following    Type 2 diabetes mellitus with hyperglycemia, without long-term current use of insulin (HCC)  Assessment & Plan  -Previous diagnosis, not currently on a medical regimen.   -Plan for basal and prandial insulin dosing while here, it may be able to be discharged with oral therapy ultimately.  -4/16 hemoglobin A1c: 8.6%  -Diabetic Educator saw patient; to f/u on Mon; will need supplies ordered before d/c    Acute hip pain, left  Assessment & Plan  -Likely muscle spasms  -Lidocaine patch  -As needed baclofen started 4/16  -Left hip x-ray negative for acute abnormalities.    Normocytic anemia  Assessment & Plan  -Suspect chronic.    -With no current evidence of bleed.    -Transfuse if needed for hemoglobin less than 7 gm/dL       VTE prophylaxis: SCDs  --------------------------------------------------------------------------------------------------------------------------------------------------------------------------------------------------------------------------------------------------------------------------------------------------------  Please note that this dictation was created using voice recognition software. I have made every reasonable attempt to correct obvious errors, but there may be errors of grammar and possibly content that I did not discover before finalizing the note.    Electronically signed by:  STEVE Murray, MSN, APRN, FNP-C  Hospitalist Services  [facility]  [phone]  [email or web address]  04/18/20    8118

## 2020-04-18 NOTE — PROGRESS NOTES
Report received. Assumed care. Pt in bed sitting up. A/O x4. VSS. Responds appropriately. Denies chest pain and SOB. Assessment complete. Discussed POC, , pt verbalizes understanding. Explained importance of calling before getting OOB. Call light and belongings within reach. Bed alarm on. Bed in the lowest position. Treaded socks in place. Hourly rounding in progress.

## 2020-04-18 NOTE — THERAPY
"Occupational Therapy Evaluation completed.   Functional Status:    44 y/o male admitted to hospital with wound, now s/p R first ray amputation, NWM. Pt demonstrated bed mobility supv, donned L shoe supv, stood with CGA, donned gown supv. Pt ambulated down the garcia and back with FWW, good adherence to WB precautions. Will continue to see patient in this setting, anticipate he will be able to DC home after 1-2 more sessions.     Plan of Care: Will benefit from Occupational Therapy 3 times per week  Discharge Recommendations:  Equipment: Will Continue to Assess for Equipment Needs. Post-acute therapy Anticipate that the patient will have no further occupational therapy needs after discharge from the hospital.       See \"Rehab Therapy-Acute\" Patient Summary Report for complete documentation.    "

## 2020-04-18 NOTE — CARE PLAN
Problem: Communication  Goal: The ability to communicate needs accurately and effectively will improve  Outcome: PROGRESSING AS EXPECTED   Patient is able to communicate needs appropriately   Problem: Safety  Goal: Will remain free from falls  Outcome: PROGRESSING AS EXPECTED   Patient continues to remain free from falls
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
## 2020-04-19 LAB
ALBUMIN SERPL BCP-MCNC: 3.1 G/DL (ref 3.2–4.9)
ALBUMIN/GLOB SERPL: 0.9 G/DL
ALP SERPL-CCNC: 144 U/L (ref 30–99)
ALT SERPL-CCNC: 45 U/L (ref 2–50)
ANION GAP SERPL CALC-SCNC: 10 MMOL/L (ref 7–16)
AST SERPL-CCNC: 20 U/L (ref 12–45)
BACTERIA TISS AEROBE CULT: ABNORMAL
BACTERIA TISS AEROBE CULT: ABNORMAL
BILIRUB SERPL-MCNC: 0.2 MG/DL (ref 0.1–1.5)
BUN SERPL-MCNC: 14 MG/DL (ref 8–22)
CALCIUM SERPL-MCNC: 8.5 MG/DL (ref 8.5–10.5)
CHLORIDE SERPL-SCNC: 100 MMOL/L (ref 96–112)
CO2 SERPL-SCNC: 25 MMOL/L (ref 20–33)
CREAT SERPL-MCNC: 0.77 MG/DL (ref 0.5–1.4)
ERYTHROCYTE [DISTWIDTH] IN BLOOD BY AUTOMATED COUNT: 38.1 FL (ref 35.9–50)
GLOBULIN SER CALC-MCNC: 3.4 G/DL (ref 1.9–3.5)
GLUCOSE BLD-MCNC: 129 MG/DL (ref 65–99)
GLUCOSE BLD-MCNC: 169 MG/DL (ref 65–99)
GLUCOSE BLD-MCNC: 192 MG/DL (ref 65–99)
GLUCOSE BLD-MCNC: 200 MG/DL (ref 65–99)
GLUCOSE BLD-MCNC: 297 MG/DL (ref 65–99)
GLUCOSE SERPL-MCNC: 189 MG/DL (ref 65–99)
GRAM STN SPEC: ABNORMAL
HCT VFR BLD AUTO: 34.6 % (ref 42–52)
HGB BLD-MCNC: 11.6 G/DL (ref 14–18)
MCH RBC QN AUTO: 28.2 PG (ref 27–33)
MCHC RBC AUTO-ENTMCNC: 33.5 G/DL (ref 33.7–35.3)
MCV RBC AUTO: 84.2 FL (ref 81.4–97.8)
PLATELET # BLD AUTO: 409 K/UL (ref 164–446)
PMV BLD AUTO: 9.7 FL (ref 9–12.9)
POTASSIUM SERPL-SCNC: 3.8 MMOL/L (ref 3.6–5.5)
PROT SERPL-MCNC: 6.5 G/DL (ref 6–8.2)
RBC # BLD AUTO: 4.11 M/UL (ref 4.7–6.1)
SIGNIFICANT IND 70042: ABNORMAL
SITE SITE: ABNORMAL
SODIUM SERPL-SCNC: 135 MMOL/L (ref 135–145)
SOURCE SOURCE: ABNORMAL
WBC # BLD AUTO: 10.8 K/UL (ref 4.8–10.8)

## 2020-04-19 PROCEDURE — 700102 HCHG RX REV CODE 250 W/ 637 OVERRIDE(OP): Performed by: INTERNAL MEDICINE

## 2020-04-19 PROCEDURE — 770006 HCHG ROOM/CARE - MED/SURG/GYN SEMI*

## 2020-04-19 PROCEDURE — A9270 NON-COVERED ITEM OR SERVICE: HCPCS | Performed by: NURSE PRACTITIONER

## 2020-04-19 PROCEDURE — 700111 HCHG RX REV CODE 636 W/ 250 OVERRIDE (IP): Performed by: HOSPITALIST

## 2020-04-19 PROCEDURE — A9270 NON-COVERED ITEM OR SERVICE: HCPCS | Performed by: HOSPITALIST

## 2020-04-19 PROCEDURE — 36415 COLL VENOUS BLD VENIPUNCTURE: CPT

## 2020-04-19 PROCEDURE — 99232 SBSQ HOSP IP/OBS MODERATE 35: CPT | Performed by: INTERNAL MEDICINE

## 2020-04-19 PROCEDURE — 85027 COMPLETE CBC AUTOMATED: CPT

## 2020-04-19 PROCEDURE — 700102 HCHG RX REV CODE 250 W/ 637 OVERRIDE(OP): Performed by: HOSPITALIST

## 2020-04-19 PROCEDURE — 700111 HCHG RX REV CODE 636 W/ 250 OVERRIDE (IP): Performed by: INTERNAL MEDICINE

## 2020-04-19 PROCEDURE — 82962 GLUCOSE BLOOD TEST: CPT | Mod: 91

## 2020-04-19 PROCEDURE — 700102 HCHG RX REV CODE 250 W/ 637 OVERRIDE(OP): Performed by: NURSE PRACTITIONER

## 2020-04-19 PROCEDURE — 80053 COMPREHEN METABOLIC PANEL: CPT

## 2020-04-19 PROCEDURE — 700105 HCHG RX REV CODE 258: Performed by: INTERNAL MEDICINE

## 2020-04-19 RX ORDER — INSULIN GLARGINE 100 [IU]/ML
10 INJECTION, SOLUTION SUBCUTANEOUS EVERY EVENING
Status: DISCONTINUED | OUTPATIENT
Start: 2020-04-19 | End: 2020-04-21 | Stop reason: HOSPADM

## 2020-04-19 RX ORDER — CYCLOBENZAPRINE HCL 10 MG
5 TABLET ORAL TWICE DAILY
Status: DISCONTINUED | OUTPATIENT
Start: 2020-04-19 | End: 2020-04-21 | Stop reason: HOSPADM

## 2020-04-19 RX ORDER — DEXTROSE MONOHYDRATE 25 G/50ML
50 INJECTION, SOLUTION INTRAVENOUS
Status: DISCONTINUED | OUTPATIENT
Start: 2020-04-19 | End: 2020-04-21 | Stop reason: HOSPADM

## 2020-04-19 RX ADMIN — INSULIN HUMAN 2 UNITS: 100 INJECTION, SOLUTION PARENTERAL at 20:14

## 2020-04-19 RX ADMIN — OXYCODONE HYDROCHLORIDE 5 MG: 5 TABLET ORAL at 20:11

## 2020-04-19 RX ADMIN — INSULIN GLARGINE 10 UNITS: 100 INJECTION, SOLUTION SUBCUTANEOUS at 17:30

## 2020-04-19 RX ADMIN — VANCOMYCIN HYDROCHLORIDE 900 MG: 500 INJECTION, POWDER, LYOPHILIZED, FOR SOLUTION INTRAVENOUS at 08:20

## 2020-04-19 RX ADMIN — AMPICILLIN SODIUM AND SULBACTAM SODIUM 3 G: 2; 1 INJECTION, POWDER, FOR SOLUTION INTRAMUSCULAR; INTRAVENOUS at 05:23

## 2020-04-19 RX ADMIN — OXYCODONE HYDROCHLORIDE 5 MG: 5 TABLET ORAL at 09:53

## 2020-04-19 RX ADMIN — INSULIN HUMAN 2 UNITS: 100 INJECTION, SOLUTION PARENTERAL at 12:23

## 2020-04-19 RX ADMIN — INSULIN HUMAN 2 UNITS: 100 INJECTION, SOLUTION PARENTERAL at 17:30

## 2020-04-19 RX ADMIN — CYCLOBENZAPRINE HYDROCHLORIDE 5 MG: 10 TABLET, FILM COATED ORAL at 17:27

## 2020-04-19 RX ADMIN — VANCOMYCIN HYDROCHLORIDE 900 MG: 500 INJECTION, POWDER, LYOPHILIZED, FOR SOLUTION INTRAVENOUS at 01:10

## 2020-04-19 RX ADMIN — AMPICILLIN SODIUM AND SULBACTAM SODIUM 3 G: 2; 1 INJECTION, POWDER, FOR SOLUTION INTRAMUSCULAR; INTRAVENOUS at 10:59

## 2020-04-19 RX ADMIN — AMPICILLIN SODIUM AND SULBACTAM SODIUM 3 G: 2; 1 INJECTION, POWDER, FOR SOLUTION INTRAMUSCULAR; INTRAVENOUS at 20:11

## 2020-04-19 RX ADMIN — AMPICILLIN SODIUM AND SULBACTAM SODIUM 3 G: 2; 1 INJECTION, POWDER, FOR SOLUTION INTRAMUSCULAR; INTRAVENOUS at 15:09

## 2020-04-19 RX ADMIN — MORPHINE SULFATE 2 MG: 4 INJECTION INTRAVENOUS at 01:17

## 2020-04-19 RX ADMIN — CYCLOBENZAPRINE HYDROCHLORIDE 5 MG: 10 TABLET, FILM COATED ORAL at 11:18

## 2020-04-19 ASSESSMENT — ENCOUNTER SYMPTOMS
COUGH: 0
GASTROINTESTINAL NEGATIVE: 1
WEAKNESS: 1
RESPIRATORY NEGATIVE: 1
CARDIOVASCULAR NEGATIVE: 1
CHILLS: 0
NAUSEA: 0
HEADACHES: 0
FOCAL WEAKNESS: 0
ABDOMINAL PAIN: 0
VOMITING: 0
SHORTNESS OF BREATH: 0
FEVER: 0
DIZZINESS: 0
PSYCHIATRIC NEGATIVE: 1
DIARRHEA: 0
EYES NEGATIVE: 1
SEIZURES: 0
MYALGIAS: 1

## 2020-04-19 NOTE — PROGRESS NOTES
"Pharmacy Kinetics 45 y.o. male on vancomycin day # 4   2020    Currently on Vancomycin 900 mg iv q8hr  (0100 0900 1700)  Provider specified end date: TBD     Indication for Treatment: Osteo     Pertinent history per medical record: Admitted on 4/15/2020 for Osteomyelitis. 45 y.o. male w/ right great toe pain and swelling x1 wk. Patient states that he had a callus on his right foot for a few weeks. Last week, he noted subjective fevers, chills. He took a shower and soaked his foot and noted that his callus fell off, since then increased pain and swelling. Went to [place] where he was treated with IV antibiotics. He was discharged with amoxicillin 500 mg 3 times daily.  Did not have any improvement, noted a foul-smelling discharge.   X ray of foot showed soft tissue swelling of the right great toe with gas as well as bony resorption consistent with osteomyelitis and septic arthritis.     Other antibiotics: Unasyn 3g iv q6h     Allergies: Patient has no known allergies.      List concerns for renal function: None at this time     Pertinent cultures to date:   20:PBCx2:NGTD  20:Rt 1st metatarsal head,BONE:Streptococcus agalactiae (Group B)  20:Rt great toe proximal phalan,TISS:NGTD         MRSA nares swab if pneumonia is a concern (ordered/positive/negative/n-a): NA     Recent Labs     20  0055 20  0012 20  0037   WBC 11.2* 16.2* 10.8   NEUTSPOLYS 68.20  --   --      Recent Labs     20  0055 20  0012 20  0037   BUN 14 18 14   CREATININE 0.70 0.85 0.77   ALBUMIN  --   --  3.1*     Recent Labs     20  1617   VANCOTROUGH 11.6       Intake/Output Summary (Last 24 hours) at 2020 0824  Last data filed at 2020 0500  Gross per 24 hour   Intake 480 ml   Output 1625 ml   Net -1145 ml      /66   Pulse 64   Temp 36.9 °C (98.4 °F) (Temporal)   Resp 16   Ht 1.727 m (5' 8\")   Wt 75.6 kg (166 lb 10.7 oz)   SpO2 99%  Temp (24hrs), Av.9 °C (98.5 " °F), Min:36.4 °C (97.5 °F), Max:37.4 °C (99.3 °F)      A/P        1. Vancomycin dose change: No change   2. Next vancomycin level: 2-3 days   3. Goal trough: 12-16 mcg/mL   4. Comments: No leukocytosis, afebrile. Bone cx Streptococcus agalactiae (Group B). Renal indices stable , last level at goal.  ID following , continue abx regimen.      Roshan Cardenas PharmD BCPS
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
## 2020-04-19 NOTE — PROGRESS NOTES
"Hospital Medicine Daily Progress Note    Date of Service  4/19/2020    Chief Complaint  45 y.o. male admitted 4/15/2020 with RIGHT food discharge    Hospital Course    Mr. Guillermo, \"Keven\", is a 45 y.o. male with history of diabetes mellitus for which he has never received treatment, was in his usual state of health until approximately 6 days prior to admission.  He began to notice a callus on his right foot just by his great toe.  He reports no pain associated with a callus, and has limited pain in his feet.  He reports some fever and chills, and noticed on the day prior to admission, that his foot began to have discharge while he was in the shower.  He also noted the callus or at least part of the callus to have fallen off.  He reports that the discharge was foul-smelling. He also has 8/10 sharp, nonradiating left hip pain exacerbated by medial and lateral rotation of the leg and palpation.  He took muscle relaxers outpatient which helped the pain.  This problem started at the same time the right foot callus came off, approximately 1 week ago. He states he has full sensation in all extremities.  He states he had a pin surgically implanted when he was approximately 12 to 14 years old to correct his contracted first toe on his right foot.  He has been told he has high blood sugars, however has not been told he has diabetes prior to this hospitalization.  Patient admitted for further evaluation and treatment.            Interval Problem Update  -Patient seen and examined; patient reports improvement over RIGHT foot; patient still complains of LEFT thigh tenderness, will switch muscle relaxant; patient aware in plan of care.  -Plan: Continue to work with PT/OT; pain control; needs diabetic education; patient being followed by Orthopedic surgery, LPS, and ID; switched to Flexaril 5mg tab PO BID for LEFT thigh pain.  POD#2: by Dr. Robledo on 4/17  1.  Right foot first ray amputation.  2.  Trimming of dystrophic callus, " right foot under first metatarsal.  3.  Percutaneous tendo-Achilles lengthening.  Per ID recommendations - Dr. Damian Arndt CX - Group B Streptococcus; DISCONTINUE Vanco today, continue Unasyn; duration of abx pending pathology results, anticipate 6 wks of abx; can be d/c on PO Augmentin; History of methamphetamine use - Patient states he quit 1 year ago  Per Diabetic Educator:   F/u on Monday with patient; will need diabetes supplies when d/c  LPS recommendation: following patient throughout hospital stay; concur with ID and Orthopedics  -Lab work: unremarkable  -VSS at this time  -Ordered CBC and CMP for am  -No significant changes from previous ROS/PE; please see previous notes for further details    Consultants/Specialty  -Orthopedic Surgery  -LPS  -ID    Code Status  FULL    Disposition  TBD    Review of Systems  Review of Systems   Constitutional: Positive for malaise/fatigue. Negative for chills and fever.   HENT: Negative.    Eyes: Negative.    Respiratory: Negative.    Cardiovascular: Negative.    Gastrointestinal: Negative.    Genitourinary: Negative.    Musculoskeletal: Positive for joint pain and myalgias.   Skin: Negative.    Neurological: Positive for weakness. Negative for focal weakness and seizures.   Endo/Heme/Allergies: Negative.    Psychiatric/Behavioral: Negative.         Physical Exam  Temp:  [36.4 °C (97.5 °F)-37.4 °C (99.3 °F)] 36.7 °C (98 °F)  Pulse:  [61-76] 61  Resp:  [16-18] 17  BP: (108-146)/(66-75) 117/69  SpO2:  [97 %-99 %] 98 %    Physical Exam  Vitals signs and nursing note reviewed.   Constitutional:       Appearance: Normal appearance.   HENT:      Head: Normocephalic.      Nose: Nose normal.      Mouth/Throat:      Mouth: Mucous membranes are moist.   Eyes:      Pupils: Pupils are equal, round, and reactive to light.   Neck:      Musculoskeletal: Normal range of motion.   Cardiovascular:      Rate and Rhythm: Normal rate and regular rhythm.      Pulses: Normal pulses.      Heart  sounds: Normal heart sounds.   Pulmonary:      Effort: Pulmonary effort is normal.      Breath sounds: Normal breath sounds.   Abdominal:      General: Abdomen is flat. Bowel sounds are normal.      Palpations: Abdomen is soft.   Musculoskeletal:         General: Swelling, tenderness and signs of injury present.      Right lower leg: Edema present.   Skin:     General: Skin is warm and dry.      Capillary Refill: Capillary refill takes 2 to 3 seconds.   Neurological:      Mental Status: He is alert. Mental status is at baseline.         Fluids    Intake/Output Summary (Last 24 hours) at 4/19/2020 1057  Last data filed at 4/19/2020 0500  Gross per 24 hour   Intake 480 ml   Output 1625 ml   Net -1145 ml       Laboratory  Recent Labs     04/17/20  0055 04/18/20  0012 04/19/20  0037   WBC 11.2* 16.2* 10.8   RBC 4.12* 4.15* 4.11*   HEMOGLOBIN 11.7* 11.7* 11.6*   HEMATOCRIT 34.2* 34.2* 34.6*   MCV 83.0 82.4 84.2   MCH 28.4 28.2 28.2   MCHC 34.2 34.2 33.5*   RDW 38.4 37.2 38.1   PLATELETCT 328 375 409   MPV 10.1 9.9 9.7     Recent Labs     04/17/20  0055 04/18/20  0012 04/19/20  0037   SODIUM 134* 129* 135   POTASSIUM 4.1 4.5 3.8   CHLORIDE 100 95* 100   CO2 23 22 25   GLUCOSE 172* 382* 189*   BUN 14 18 14   CREATININE 0.70 0.85 0.77   CALCIUM 8.6 8.7 8.5                   Imaging  DX-HIP-UNILATERAL-WITH PELVIS-1 VIEW LEFT   Final Result      1.  Unremarkable left hip series.      DX-FOOT-COMPLETE 3+ RIGHT   Final Result      Soft tissue swelling of the RIGHT great toe with gas present as well as a bony resorption of the distal tuft and interphalangeal joint consistent with osteomyelitis and septic arthritis.           Assessment/Plan  * Subacute osteomyelitis of right foot ([facility])  Assessment & Plan  -Leukocytosis   - uncontrolled diabetes mellitus.    -Dr. Robledo completed orthopedic surgery 4/17  -POD#2:  1.  Right foot first ray amputation.  2.  Trimming of dystrophic callus, right foot under first metatarsal.  3.   Percutaneous tendo-Achilles lengthening.  -Continue intravenous antibiotic therapy per ID : Vanco and Unasyn    -Monitor surgical culture results.  -Limb preservation service following.    Septic arthritis of right foot ([facility])  Assessment & Plan  -Continue Unasyn and vancomycin, monitor.   -Orthopedics and LPS following    Type 2 diabetes mellitus with hyperglycemia, without long-term current use of insulin ([facility])  Assessment & Plan  -Previous diagnosis, not currently on a medical regimen.   -Plan for basal and prandial insulin dosing while here, it may be able to be discharged with oral therapy ultimately.  -4/16 hemoglobin A1c: 8.6%  -Diabetic Educator saw patient; to f/u on Mon; will need supplies ordered before d/c    Acute hip pain, left  Assessment & Plan  -Likely muscle spasms  -Lidocaine patch  -As needed baclofen started 4/16  -Left hip x-ray negative for acute abnormalities.    Normocytic anemia  Assessment & Plan  -Suspect chronic.    -With no current evidence of bleed.    -Transfuse if needed for hemoglobin less than 7 gm/dL       VTE prophylaxis: SCDs  --------------------------------------------------------------------------------------------------------------------------------------------------------------------------------------------------------------------------------------------------------------------------------------------------------  Please note that this dictation was created using voice recognition software. I have made every reasonable attempt to correct obvious errors, but there may be errors of grammar and possibly content that I did not discover before finalizing the note.    Electronically signed by:  STEVE Murray, MSN, APRN, FNP-C  Hospitalist Services  [facility]  [phone]  [email or web address]  04/19/20     111

## 2020-04-19 NOTE — CARE PLAN
Problem: Safety  Goal: Will remain free from injury  Outcome: PROGRESSING AS EXPECTED  Intervention: Provide assistance with mobility  Flowsheets (Taken 4/18/2020 1500 by Sobeida Camejo, R.N.)  Ambulation Tolerance: Tolerates Well  Note: Pt's call light within reach, pt calls for assistance, bed locked and in lowest position, frequent rounded, bed alarm in place, personal items within reach      Problem: Infection  Goal: Will remain free from infection  Outcome: PROGRESSING AS EXPECTED  Intervention: Implement standard precautions and perform hand washing before and after patient contact  Note: Handwashing performed, pt educate on importance of handwashing

## 2020-04-20 ENCOUNTER — PATIENT OUTREACH (OUTPATIENT)
Dept: HEALTH INFORMATION MANAGEMENT | Facility: OTHER | Age: 45
End: 2020-04-20

## 2020-04-20 LAB
ALBUMIN SERPL BCP-MCNC: 3.3 G/DL (ref 3.2–4.9)
ALBUMIN/GLOB SERPL: 0.8 G/DL
ALP SERPL-CCNC: 129 U/L (ref 30–99)
ALT SERPL-CCNC: 38 U/L (ref 2–50)
ANION GAP SERPL CALC-SCNC: 11 MMOL/L (ref 7–16)
AST SERPL-CCNC: 17 U/L (ref 12–45)
BACTERIA SPEC ANAEROBE CULT: ABNORMAL
BACTERIA SPEC ANAEROBE CULT: ABNORMAL
BILIRUB SERPL-MCNC: 0.4 MG/DL (ref 0.1–1.5)
BUN SERPL-MCNC: 18 MG/DL (ref 8–22)
CALCIUM SERPL-MCNC: 9.2 MG/DL (ref 8.5–10.5)
CHLORIDE SERPL-SCNC: 96 MMOL/L (ref 96–112)
CO2 SERPL-SCNC: 26 MMOL/L (ref 20–33)
CREAT SERPL-MCNC: 0.83 MG/DL (ref 0.5–1.4)
ERYTHROCYTE [DISTWIDTH] IN BLOOD BY AUTOMATED COUNT: 39.6 FL (ref 35.9–50)
GLOBULIN SER CALC-MCNC: 3.9 G/DL (ref 1.9–3.5)
GLUCOSE BLD-MCNC: 132 MG/DL (ref 65–99)
GLUCOSE BLD-MCNC: 168 MG/DL (ref 65–99)
GLUCOSE BLD-MCNC: 214 MG/DL (ref 65–99)
GLUCOSE BLD-MCNC: 257 MG/DL (ref 65–99)
GLUCOSE SERPL-MCNC: 144 MG/DL (ref 65–99)
HCT VFR BLD AUTO: 40.4 % (ref 42–52)
HGB BLD-MCNC: 13.1 G/DL (ref 14–18)
MCH RBC QN AUTO: 27.7 PG (ref 27–33)
MCHC RBC AUTO-ENTMCNC: 32.4 G/DL (ref 33.7–35.3)
MCV RBC AUTO: 85.4 FL (ref 81.4–97.8)
PLATELET # BLD AUTO: 473 K/UL (ref 164–446)
PMV BLD AUTO: 9.4 FL (ref 9–12.9)
POTASSIUM SERPL-SCNC: 4.6 MMOL/L (ref 3.6–5.5)
PROT SERPL-MCNC: 7.2 G/DL (ref 6–8.2)
RBC # BLD AUTO: 4.73 M/UL (ref 4.7–6.1)
SIGNIFICANT IND 70042: ABNORMAL
SITE SITE: ABNORMAL
SODIUM SERPL-SCNC: 133 MMOL/L (ref 135–145)
SOURCE SOURCE: ABNORMAL
WBC # BLD AUTO: 10.2 K/UL (ref 4.8–10.8)

## 2020-04-20 PROCEDURE — 700111 HCHG RX REV CODE 636 W/ 250 OVERRIDE (IP): Performed by: INTERNAL MEDICINE

## 2020-04-20 PROCEDURE — 700102 HCHG RX REV CODE 250 W/ 637 OVERRIDE(OP): Performed by: NURSE PRACTITIONER

## 2020-04-20 PROCEDURE — 85027 COMPLETE CBC AUTOMATED: CPT

## 2020-04-20 PROCEDURE — A9270 NON-COVERED ITEM OR SERVICE: HCPCS | Performed by: HOSPITALIST

## 2020-04-20 PROCEDURE — 700102 HCHG RX REV CODE 250 W/ 637 OVERRIDE(OP): Performed by: INTERNAL MEDICINE

## 2020-04-20 PROCEDURE — 700105 HCHG RX REV CODE 258: Performed by: INTERNAL MEDICINE

## 2020-04-20 PROCEDURE — 97530 THERAPEUTIC ACTIVITIES: CPT

## 2020-04-20 PROCEDURE — 700102 HCHG RX REV CODE 250 W/ 637 OVERRIDE(OP): Performed by: HOSPITALIST

## 2020-04-20 PROCEDURE — 700105 HCHG RX REV CODE 258

## 2020-04-20 PROCEDURE — 99232 SBSQ HOSP IP/OBS MODERATE 35: CPT | Performed by: INTERNAL MEDICINE

## 2020-04-20 PROCEDURE — 82962 GLUCOSE BLOOD TEST: CPT | Mod: 91

## 2020-04-20 PROCEDURE — A9270 NON-COVERED ITEM OR SERVICE: HCPCS | Performed by: NURSE PRACTITIONER

## 2020-04-20 PROCEDURE — 700101 HCHG RX REV CODE 250: Performed by: NURSE PRACTITIONER

## 2020-04-20 PROCEDURE — 770006 HCHG ROOM/CARE - MED/SURG/GYN SEMI*

## 2020-04-20 PROCEDURE — 99232 SBSQ HOSP IP/OBS MODERATE 35: CPT | Performed by: NURSE PRACTITIONER

## 2020-04-20 PROCEDURE — 80053 COMPREHEN METABOLIC PANEL: CPT

## 2020-04-20 PROCEDURE — 36415 COLL VENOUS BLD VENIPUNCTURE: CPT

## 2020-04-20 RX ORDER — SODIUM CHLORIDE 9 MG/ML
INJECTION, SOLUTION INTRAVENOUS
Status: COMPLETED
Start: 2020-04-20 | End: 2020-04-20

## 2020-04-20 RX ADMIN — AMPICILLIN SODIUM AND SULBACTAM SODIUM 3 G: 2; 1 INJECTION, POWDER, FOR SOLUTION INTRAMUSCULAR; INTRAVENOUS at 11:30

## 2020-04-20 RX ADMIN — INSULIN GLARGINE 10 UNITS: 100 INJECTION, SOLUTION SUBCUTANEOUS at 18:32

## 2020-04-20 RX ADMIN — AMPICILLIN SODIUM AND SULBACTAM SODIUM 3 G: 2; 1 INJECTION, POWDER, FOR SOLUTION INTRAMUSCULAR; INTRAVENOUS at 04:27

## 2020-04-20 RX ADMIN — OXYCODONE HYDROCHLORIDE 5 MG: 5 TABLET ORAL at 04:27

## 2020-04-20 RX ADMIN — CYCLOBENZAPRINE HYDROCHLORIDE 5 MG: 10 TABLET, FILM COATED ORAL at 04:27

## 2020-04-20 RX ADMIN — SODIUM CHLORIDE 500 ML: 9 INJECTION, SOLUTION INTRAVENOUS at 21:09

## 2020-04-20 RX ADMIN — AMPICILLIN SODIUM AND SULBACTAM SODIUM 3 G: 2; 1 INJECTION, POWDER, FOR SOLUTION INTRAMUSCULAR; INTRAVENOUS at 21:08

## 2020-04-20 RX ADMIN — LIDOCAINE 1 PATCH: 50 PATCH TOPICAL at 18:34

## 2020-04-20 RX ADMIN — INSULIN HUMAN 3 UNITS: 100 INJECTION, SOLUTION PARENTERAL at 18:32

## 2020-04-20 RX ADMIN — INSULIN HUMAN 2 UNITS: 100 INJECTION, SOLUTION PARENTERAL at 11:53

## 2020-04-20 RX ADMIN — AMPICILLIN SODIUM AND SULBACTAM SODIUM 3 G: 2; 1 INJECTION, POWDER, FOR SOLUTION INTRAMUSCULAR; INTRAVENOUS at 18:28

## 2020-04-20 RX ADMIN — INSULIN HUMAN 5 UNITS: 100 INJECTION, SOLUTION PARENTERAL at 21:13

## 2020-04-20 RX ADMIN — CYCLOBENZAPRINE HYDROCHLORIDE 5 MG: 10 TABLET, FILM COATED ORAL at 18:28

## 2020-04-20 SDOH — ECONOMIC STABILITY: FOOD INSECURITY: WITHIN THE PAST 12 MONTHS, YOU WORRIED THAT YOUR FOOD WOULD RUN OUT BEFORE YOU GOT MONEY TO BUY MORE.: SOMETIMES TRUE

## 2020-04-20 SDOH — ECONOMIC STABILITY: INCOME INSECURITY: HOW HARD IS IT FOR YOU TO PAY FOR THE VERY BASICS LIKE FOOD, HOUSING, MEDICAL CARE, AND HEATING?: VERY HARD

## 2020-04-20 SDOH — ECONOMIC STABILITY: TRANSPORTATION INSECURITY
IN THE PAST 12 MONTHS, HAS THE LACK OF TRANSPORTATION KEPT YOU FROM MEDICAL APPOINTMENTS OR FROM GETTING MEDICATIONS?: YES

## 2020-04-20 SDOH — ECONOMIC STABILITY: FOOD INSECURITY: WITHIN THE PAST 12 MONTHS, THE FOOD YOU BOUGHT JUST DIDN'T LAST AND YOU DIDN'T HAVE MONEY TO GET MORE.: SOMETIMES TRUE

## 2020-04-20 SDOH — ECONOMIC STABILITY: TRANSPORTATION INSECURITY
IN THE PAST 12 MONTHS, HAS LACK OF TRANSPORTATION KEPT YOU FROM MEETINGS, WORK, OR FROM GETTING THINGS NEEDED FOR DAILY LIVING?: YES

## 2020-04-20 ASSESSMENT — ENCOUNTER SYMPTOMS
WEAKNESS: 1
SHORTNESS OF BREATH: 0
ABDOMINAL PAIN: 0
SEIZURES: 0
CHILLS: 0
COUGH: 0
MYALGIAS: 1
DIARRHEA: 0
EYES NEGATIVE: 1
NAUSEA: 0
FOCAL WEAKNESS: 0
GASTROINTESTINAL NEGATIVE: 1
HEADACHES: 0
CARDIOVASCULAR NEGATIVE: 1
PSYCHIATRIC NEGATIVE: 1
DIZZINESS: 0
VOMITING: 0
RESPIRATORY NEGATIVE: 1
FEVER: 0

## 2020-04-20 ASSESSMENT — COGNITIVE AND FUNCTIONAL STATUS - GENERAL
SUGGESTED CMS G CODE MODIFIER DAILY ACTIVITY: CJ
DRESSING REGULAR LOWER BODY CLOTHING: A LITTLE
HELP NEEDED FOR BATHING: A LITTLE
DAILY ACTIVITIY SCORE: 21
TOILETING: A LITTLE

## 2020-04-20 NOTE — THERAPY
"Occupational Therapy Treatment completed with focus on ADL transfers and patient education.  Functional Status: Clem for toilet transfer  Plan of Care: Will benefit from Occupational Therapy 3 times per week  Discharge Recommendations:  Equipment Tub Transfer Bench and Raised Toilet Seat. Post-acute therapy Anticipate that the patient will have no further occupational therapy needs after discharge from the hospital.     Pt seen for OT tx. Pt able to move from supine to seated EOB with spv, with HOB elevated and use of bed rails. Pt stood with spv and use of FWW. Pt able to ambulate, while maintaining NWB RLE to bathroom with FWW. Pt completed toilet transfer onto standard toilet with Clem and use of grab bars. Pt required Clem to stand from toilet. Recommended pt obtain tub transfer bench and raised toilet seat for safety and ease with bathroom transfers. Pt provided a handout and verbalized understanding/agreement. Pt will benefit from continued acute OT tx to address increased independence with ADL transfers.     See \"Rehab Therapy-Acute\" Patient Summary Report for complete documentation.   "

## 2020-04-20 NOTE — PROGRESS NOTES
"Hospital Medicine Daily Progress Note    Date of Service  4/20/2020    Chief Complaint  45 y.o. male admitted 4/15/2020 with RIGHT food discharge    Hospital Course    Mr. Guillermo, \"Jevon", is a 45 y.o. male with history of diabetes mellitus for which he has never received treatment, was in his usual state of health until approximately 6 days prior to admission.  He began to notice a callus on his right foot just by his great toe.  He reports no pain associated with a callus, and has limited pain in his feet.  He reports some fever and chills, and noticed on the day prior to admission, that his foot began to have discharge while he was in the shower.  He also noted the callus or at least part of the callus to have fallen off.  He reports that the discharge was foul-smelling. He also has 8/10 sharp, nonradiating left hip pain exacerbated by medial and lateral rotation of the leg and palpation.  He took muscle relaxers outpatient which helped the pain.  This problem started at the same time the right foot callus came off, approximately 1 week ago. He states he has full sensation in all extremities.  He states he had a pin surgically implanted when he was approximately 12 to 14 years old to correct his contracted first toe on his right foot.  He has been told he has high blood sugars, however has not been told he has diabetes prior to this hospitalization.  Patient admitted for further evaluation and treatment.            Interval Problem Update  -Patient seen and examined; reports right foot feeling the same, with some tenderness; LEFT thigh improved with Flexeril; encouraged to work with PT/OT; patient aware in plan of care.  -Plan: Continue to work with PT/OT; pain control; needs diabetic education - educator to come today; patient being followed by Orthopedic surgery, LPS, and ID; contine Unasyn; pending pathology results from bone biopsy for duration of abx   POD#3: by Dr. Robledo on 4/17  1.  Right foot first ray " amputation.  2.  Trimming of dystrophic callus, right foot under first metatarsal.  3.  Percutaneous tendo-Achilles lengthening.  Per ID recommendations - Dr. 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 Negative for chills and fever.   HENT: Negative.    Eyes: Negative.    Respiratory: Negative.    Cardiovascular: Negative.    Gastrointestinal: Negative.    Genitourinary: Negative.    Musculoskeletal: Positive for joint pain and myalgias.   Skin: Negative.    Neurological: Positive for weakness. Negative for focal weakness and seizures.   Endo/Heme/Allergies: Negative.    Psychiatric/Behavioral: Negative.         Physical Exam  Temp:  [36.4 °C (97.5 °F)-37.1 °C (98.8 °F)] 36.4 °C (97.6 °F)  Pulse:  [60-63] 60  Resp:  [16-18] 16  BP: (100-113)/(63-68) 100/63  SpO2:  [94 %-99 %] 94 %    Physical Exam  Vitals signs and nursing note reviewed.   Constitutional:       Appearance: Normal appearance.   HENT:      Head: Normocephalic.      Nose: Nose normal.      Mouth/Throat:      Mouth: Mucous membranes are moist.   Eyes:      Pupils: Pupils are equal, round, and reactive to light.   Neck:      Musculoskeletal: Normal range of motion.   Cardiovascular:       Rate and Rhythm: Normal rate and regular rhythm.      Pulses: Normal pulses.      Heart sounds: Normal heart sounds.   Pulmonary:      Effort: Pulmonary effort is normal.      Breath sounds: Normal breath sounds.   Abdominal:      General: Abdomen is flat. Bowel sounds are normal.      Palpations: Abdomen is soft.   Musculoskeletal:         General: Swelling, tenderness and signs of injury present.      Right lower leg: Edema present.   Skin:     General: Skin is warm and dry.      Capillary Refill: Capillary refill takes 2 to 3 seconds.   Neurological:      Mental Status: He is alert. Mental status is at baseline.         Fluids    Intake/Output Summary (Last 24 hours) at 4/20/2020 0955  Last data filed at 4/19/2020 2100  Gross per 24 hour   Intake --   Output 900 ml   Net -900 ml       Laboratory  Recent Labs     04/18/20  0012 04/19/20  0037 04/20/20  0421   WBC 16.2* 10.8 10.2   RBC 4.15* 4.11* 4.73   HEMOGLOBIN 11.7* 11.6* 13.1*   HEMATOCRIT 34.2* 34.6* 40.4*   MCV 82.4 84.2 85.4   MCH 28.2 28.2 27.7   MCHC 34.2 33.5* 32.4*   RDW 37.2 38.1 39.6   PLATELETCT 375 409 473*   MPV 9.9 9.7 9.4     Recent Labs     04/18/20  0012 04/19/20  0037 04/20/20  0421   SODIUM 129* 135 133*   POTASSIUM 4.5 3.8 4.6   CHLORIDE 95* 100 96   CO2 22 25 26   GLUCOSE 382* 189* 144*   BUN 18 14 18   CREATININE 0.85 0.77 0.83   CALCIUM 8.7 8.5 9.2                   Imaging  DX-HIP-UNILATERAL-WITH PELVIS-1 VIEW LEFT   Final Result      1.  Unremarkable left hip series.      DX-FOOT-COMPLETE 3+ RIGHT   Final Result      Soft tissue swelling of the RIGHT great toe with gas present as well as a bony resorption of the distal tuft and interphalangeal joint consistent with osteomyelitis and septic arthritis.           Assessment/Plan  * Subacute osteomyelitis of right foot (HCC)  Assessment & Plan  -Leukocytosis   - uncontrolled diabetes mellitus.    -Dr. Robledo completed orthopedic surgery 4/17  -POD#3:  1.  Right foot first ray  amputation.  2.  Trimming of dystrophic callus, right foot under first metatarsal.  3.  Percutaneous tendo-Achilles lengthening.  -Continue intravenous antibiotic therapy per ID : Unasyn; can switch to PO Augmentin, pending pathology results for duration of abx; anticipated approx 6wks    -Monitor surgical culture results.  -Limb preservation service following.    Septic arthritis of right foot (HCC)  Assessment & Plan  -Continue Unasyn and vancomycin, monitor.   -Orthopedics and LPS following    Type 2 diabetes mellitus with hyperglycemia, without long-term current use of insulin ([facility])  Assessment & Plan  -Previous diagnosis, not currently on a medical regimen.   -Plan for basal and prandial insulin dosing while here, it may be able to be discharged with oral therapy ultimately.  -4/16 hemoglobin A1c: 8.6%  -Diabetic Educator saw patient; to f/u on Mon; will need supplies ordered before d/c    Acute hip pain, left  Assessment & Plan  -Likely muscle spasms  -Lidocaine patch  -As needed baclofen started 4/16  -Left hip x-ray negative for acute abnormalities.  -Switched to Flexiril 5mg BID    Normocytic anemia  Assessment & Plan  -Suspect chronic.    -With no current evidence of bleed.    -Transfuse if needed for hemoglobin less than 7 gm/dL       VTE prophylaxis: SCDs  --------------------------------------------------------------------------------------------------------------------------------------------------------------------------------------------------------------------------------------------------------------------------------------------------------  Please note that this dictation was created using voice recognition software. I have made every reasonable attempt to correct obvious errors, but there may be errors of grammar and possibly content that I did not discover before finalizing the note.    Electronically signed by:  STEVE Murray, MSN, APRN, FNP-C  Hospitalist Services  [facility]  [facility]  [phone]  [email or web address]  04/20/20    0563
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
## 2020-04-20 NOTE — PROGRESS NOTES
CHW spoke with pt via bedside TC to introduce CCM services. Pt reports PCP is Ese Vegas from [facility]. Pt reports would like to switch PCP. Informed pt about [facility]. Per pt request, pt would like to wait for after d/c to schedule follow up. Pt reports recently moved here from [place]. Pt reports transportation issues getting to [facility], uses public transportation. Informed pt about [facility]TM. Pt reports trouble paying for resources such as care, housing, and food. Informed pt about food-is rx. Pt reports currently not working due to Covid-19. Pt feels confident in managing his health after d/c. Pt interested in meds to beds.     Plan: Follow up call after d/c. Assist w/ PCP. Provide contact info for MATT and MTM.

## 2020-04-20 NOTE — CARE PLAN
Problem: Communication  Goal: The ability to communicate needs accurately and effectively will improve  Outcome: PROGRESSING AS EXPECTED  Note: Patient able to communicate needs. All questions answered at this time.      Problem: Safety  Goal: Will remain free from injury  Outcome: PROGRESSING AS EXPECTED  Note: Non-slip socks are on, bed is locked and in lowest position, personal belongings are within reach, room is free of clutter and spills, call light is in place. Patient educated on how to use the call light and how to call for assistance. Patient verbalized understanding.       Problem: Infection  Goal: Will remain free from infection  Outcome: PROGRESSING AS EXPECTED  Note: Standard precautions in place. Cleansed hands before and after patient care to prevent the spread of germs.

## 2020-04-21 ENCOUNTER — PATIENT OUTREACH (OUTPATIENT)
Dept: HEALTH INFORMATION MANAGEMENT | Facility: OTHER | Age: 45
End: 2020-04-21

## 2020-04-21 VITALS
HEART RATE: 67 BPM | BODY MASS INDEX: 25.26 KG/M2 | RESPIRATION RATE: 17 BRPM | TEMPERATURE: 98.1 F | OXYGEN SATURATION: 95 % | DIASTOLIC BLOOD PRESSURE: 62 MMHG | SYSTOLIC BLOOD PRESSURE: 102 MMHG | HEIGHT: 68 IN | WEIGHT: 166.67 LBS

## 2020-04-21 LAB
ALBUMIN SERPL BCP-MCNC: 3.2 G/DL (ref 3.2–4.9)
ALBUMIN/GLOB SERPL: 0.8 G/DL
ALP SERPL-CCNC: 150 U/L (ref 30–99)
ALT SERPL-CCNC: 36 U/L (ref 2–50)
ANION GAP SERPL CALC-SCNC: 9 MMOL/L (ref 7–16)
AST SERPL-CCNC: 14 U/L (ref 12–45)
BACTERIA BLD CULT: NORMAL
BACTERIA BLD CULT: NORMAL
BACTERIA TISS AEROBE CULT: ABNORMAL
BILIRUB SERPL-MCNC: 0.3 MG/DL (ref 0.1–1.5)
BUN SERPL-MCNC: 21 MG/DL (ref 8–22)
CALCIUM SERPL-MCNC: 9.1 MG/DL (ref 8.5–10.5)
CHLORIDE SERPL-SCNC: 99 MMOL/L (ref 96–112)
CO2 SERPL-SCNC: 25 MMOL/L (ref 20–33)
CREAT SERPL-MCNC: 0.85 MG/DL (ref 0.5–1.4)
ERYTHROCYTE [DISTWIDTH] IN BLOOD BY AUTOMATED COUNT: 38.5 FL (ref 35.9–50)
GLOBULIN SER CALC-MCNC: 3.8 G/DL (ref 1.9–3.5)
GLUCOSE BLD-MCNC: 138 MG/DL (ref 65–99)
GLUCOSE BLD-MCNC: 152 MG/DL (ref 65–99)
GLUCOSE SERPL-MCNC: 164 MG/DL (ref 65–99)
GRAM STN SPEC: ABNORMAL
HCT VFR BLD AUTO: 37.5 % (ref 42–52)
HCV AB SER QL: NORMAL
HGB BLD-MCNC: 12.8 G/DL (ref 14–18)
HIV 1+2 AB+HIV1 P24 AG SERPL QL IA: NORMAL
MCH RBC QN AUTO: 28.3 PG (ref 27–33)
MCHC RBC AUTO-ENTMCNC: 34.1 G/DL (ref 33.7–35.3)
MCV RBC AUTO: 83 FL (ref 81.4–97.8)
PLATELET # BLD AUTO: 465 K/UL (ref 164–446)
PMV BLD AUTO: 9.1 FL (ref 9–12.9)
POTASSIUM SERPL-SCNC: 4.1 MMOL/L (ref 3.6–5.5)
PROT SERPL-MCNC: 7 G/DL (ref 6–8.2)
RBC # BLD AUTO: 4.52 M/UL (ref 4.7–6.1)
SIGNIFICANT IND 70042: ABNORMAL
SIGNIFICANT IND 70042: NORMAL
SIGNIFICANT IND 70042: NORMAL
SITE SITE: ABNORMAL
SITE SITE: NORMAL
SITE SITE: NORMAL
SODIUM SERPL-SCNC: 133 MMOL/L (ref 135–145)
SOURCE SOURCE: ABNORMAL
SOURCE SOURCE: NORMAL
SOURCE SOURCE: NORMAL
WBC # BLD AUTO: 9 K/UL (ref 4.8–10.8)

## 2020-04-21 PROCEDURE — 700111 HCHG RX REV CODE 636 W/ 250 OVERRIDE (IP): Performed by: INTERNAL MEDICINE

## 2020-04-21 PROCEDURE — 99232 SBSQ HOSP IP/OBS MODERATE 35: CPT | Performed by: INTERNAL MEDICINE

## 2020-04-21 PROCEDURE — 87389 HIV-1 AG W/HIV-1&-2 AB AG IA: CPT

## 2020-04-21 PROCEDURE — 99239 HOSP IP/OBS DSCHRG MGMT >30: CPT | Performed by: HOSPITALIST

## 2020-04-21 PROCEDURE — 86803 HEPATITIS C AB TEST: CPT

## 2020-04-21 PROCEDURE — 700105 HCHG RX REV CODE 258: Performed by: INTERNAL MEDICINE

## 2020-04-21 PROCEDURE — 85027 COMPLETE CBC AUTOMATED: CPT

## 2020-04-21 PROCEDURE — 700102 HCHG RX REV CODE 250 W/ 637 OVERRIDE(OP): Performed by: NURSE PRACTITIONER

## 2020-04-21 PROCEDURE — A9270 NON-COVERED ITEM OR SERVICE: HCPCS | Performed by: NURSE PRACTITIONER

## 2020-04-21 PROCEDURE — 36415 COLL VENOUS BLD VENIPUNCTURE: CPT

## 2020-04-21 PROCEDURE — 82962 GLUCOSE BLOOD TEST: CPT | Mod: 91

## 2020-04-21 PROCEDURE — 80053 COMPREHEN METABOLIC PANEL: CPT

## 2020-04-21 RX ORDER — INSULIN GLARGINE 100 [IU]/ML
10 INJECTION, SOLUTION SUBCUTANEOUS EVERY EVENING
Qty: 3 PEN | Refills: 3 | Status: SHIPPED
Start: 2020-04-21 | End: 2020-04-21

## 2020-04-21 RX ORDER — LANCETS 30 GAUGE
EACH MISCELLANEOUS
Qty: 100 EACH | Refills: 0 | Status: SHIPPED | OUTPATIENT
Start: 2020-04-21 | End: 2022-01-21

## 2020-04-21 RX ORDER — CYCLOBENZAPRINE HCL 5 MG
5 TABLET ORAL 2 TIMES DAILY
Qty: 30 TAB | Refills: 0 | Status: SHIPPED
Start: 2020-04-21 | End: 2020-05-21

## 2020-04-21 RX ORDER — OXYCODONE HYDROCHLORIDE 5 MG/1
5 TABLET ORAL EVERY 6 HOURS PRN
Qty: 28 TAB | Refills: 0 | Status: SHIPPED | OUTPATIENT
Start: 2020-04-21 | End: 2020-04-28

## 2020-04-21 RX ORDER — GLUCOSAMINE HCL/CHONDROITIN SU 500-400 MG
CAPSULE ORAL
Qty: 100 EACH | Refills: 0 | Status: SHIPPED | OUTPATIENT
Start: 2020-04-21 | End: 2022-01-21

## 2020-04-21 RX ORDER — AMOXICILLIN AND CLAVULANATE POTASSIUM 875; 125 MG/1; MG/1
1 TABLET, FILM COATED ORAL 2 TIMES DAILY
Qty: 84 TAB | Refills: 0 | Status: SHIPPED
Start: 2020-04-21 | End: 2020-05-21

## 2020-04-21 RX ORDER — LISINOPRIL 2.5 MG/1
2.5 TABLET ORAL DAILY
Qty: 30 TAB | Refills: 0 | Status: SHIPPED | OUTPATIENT
Start: 2020-04-21 | End: 2022-01-21

## 2020-04-21 RX ORDER — INSULIN GLARGINE 100 [IU]/ML
10 INJECTION, SOLUTION SUBCUTANEOUS EVERY EVENING
Qty: 5 PEN | Refills: 3 | Status: SHIPPED | OUTPATIENT
Start: 2020-04-21 | End: 2022-01-21

## 2020-04-21 RX ADMIN — CYCLOBENZAPRINE HYDROCHLORIDE 5 MG: 10 TABLET, FILM COATED ORAL at 05:35

## 2020-04-21 RX ADMIN — INSULIN HUMAN 2 UNITS: 100 INJECTION, SOLUTION PARENTERAL at 13:12

## 2020-04-21 RX ADMIN — AMPICILLIN SODIUM AND SULBACTAM SODIUM 3 G: 2; 1 INJECTION, POWDER, FOR SOLUTION INTRAMUSCULAR; INTRAVENOUS at 10:55

## 2020-04-21 RX ADMIN — AMPICILLIN SODIUM AND SULBACTAM SODIUM 3 G: 2; 1 INJECTION, POWDER, FOR SOLUTION INTRAMUSCULAR; INTRAVENOUS at 05:33

## 2020-04-21 ASSESSMENT — ENCOUNTER SYMPTOMS
DIARRHEA: 0
HEADACHES: 0
SHORTNESS OF BREATH: 0
COUGH: 0
ABDOMINAL PAIN: 0
FEVER: 0
DIZZINESS: 0
NAUSEA: 0
CHILLS: 0
VOMITING: 0

## 2020-04-21 NOTE — PROGRESS NOTES
"Diabetes education: Met with pt this afternoon. Pt had not given his own insulin with nursing as asked last week. Pt states \"the nurses give it, I want to do pills\". Pt is currently on Lantus 10 units ( increased from 8 units), pm with regular insulin sliding scale coverage ac and hs with blood sugars of 169 (2 units), 132, and 168 ( 2 units).  Spoke with nursing and pt to give his insulin with nursing this evening.   Plan: Pt would prefer oral agents, discussed concerns regarding blood sugars and healing. Pt states he has done finger sticks before. CDE will give and instruct on True Metrix meter tomorrow. Discharge medications: Metformin and Glimepiride or Admelog vial and Basaglar kwik pen, 3/10 cc syringes and pen needles.  He will also need prescription for True metrix test strips and lancets. Please use dm supplies order set at discharge to obtain supplies and dx code needed by Medicaid. CDE will meet pt after 1030 tomorrow. Please call [phone] if questions. Pt could qualify for meds to beds. Please send prescriptions to [facility] pharmacy.  "

## 2020-04-21 NOTE — CARE PLAN
Problem: Safety  Goal: Will remain free from injury  Outcome: PROGRESSING AS EXPECTED   Treaded socks in place, bed in the lowest position, call light and belongings within reach, pt call for assistance appropriately   Problem: Pain Management  Goal: Pain level will decrease to patient's comfort goal  Outcome: PROGRESSING AS EXPECTED     Problem: Mobility  Goal: Risk for activity intolerance will decrease  Outcome: PROGRESSING AS EXPECTED   Encouraged pt. To get up for meals and ambulate at least 50 feet.

## 2020-04-21 NOTE — CARE PLAN
Problem: Venous Thromboembolism (VTW)/Deep Vein Thrombosis (DVT) Prevention:  Goal: Patient will participate in Venous Thrombosis (VTE)/Deep Vein Thrombosis (DVT)Prevention Measures  Outcome: PROGRESSING AS EXPECTED  Flowsheets (Taken 4/21/2020 0843)  Risk Assessment Score: 3  VTE RISK: High  Mechanical Prophylaxis: SCDs, Sequential Compression Device  SCDs, Sequential Compression Device: Off  Note: Monitor for anticoagulation complications and contraindications.  Ensure patient wears scds when in bed if ordered.  Encourage frequent ambulation if appropriate.  Administer medication as ordered and seen in eMAR.      Problem: Pain Management  Goal: Pain level will decrease to patient's comfort goal  Outcome: PROGRESSING AS EXPECTED  Flowsheets (Taken 4/21/2020 0842)  Non Verbal Scale:   Calm   Unlabored Breathing  Pain Rating Scale (NPRS): 0  Note: Pain scale explained to patient. Requests pain medications when appropriate. Medication administered as ordered per MAR.  Nonpharmacologic pain interventions in use.

## 2020-04-21 NOTE — CARE PLAN
Problem: Communication  Goal: The ability to communicate needs accurately and effectively will improve  Outcome: MET  Note: Pt uses call light as necessary to contact medical staff      Problem: Safety  Goal: Will remain free from injury  Outcome: MET  Note: Bed locked in lowest position with top two siderails up. Call light within reach. Treaded socks on. Educated patient to call for help before getting up   Goal: Will remain free from falls  Outcome: MET

## 2020-04-21 NOTE — DISCHARGE SUMMARY
Discharge Summary    CHIEF COMPLAINT ON ADMISSION  Chief Complaint   Patient presents with   • Wound Check     existing wound diabetic foot wound on R great toe, seen at  for this friday, given IV antibiotics        Reason for Admission  Wound check      Admission Date  4/15/2020    CODE STATUS  Full Code    HPI & HOSPITAL COURSE  This is a 45 y.o. male here with right great toe pain and callus development.  Patient is found to be a diabetic.  He was started on diabetic management at this point in time.  Hemoglobin A1c was noted to be 8.6.  Patient was initiated on Lantus insulin as well as metformin.  The patient's toe was evaluated and found to have osteomyelitis and it with an infection growing out Bacteroides fragilis, Streptococcus, Staphylococcus epidermidis that is pansensitive.  Patient was started on Unasyn antibiotics.  Orthopedics was consulted and the patient then underwent a ray amputation of the right great toe.  The patient's pain at this point is well-controlled.  Antibiotic sensitivities have been obtained and he is able to be switched over to Augmentin.  I discussed this with infectious disease.  Patient will need outpatient follow-ups with infectious disease as well as orthopedics.  Patient otherwise at this point is stable and can be discharged home to continue his management as an outpatient.  Patient will need follow-ups with his primary care physician regarding his diabetic management as well.       Therefore, he is discharged in good and stable condition to home with close outpatient follow-up.    The patient met 2-midnight criteria for an inpatient stay at the time of discharge.    Discharge Date  4/21/2020    FOLLOW UP ITEMS POST DISCHARGE  Follow-up with primary care physician 7 to 10 days  Follow-up with infectious diseases as scheduled  Follow-up with orthopedics as scheduled    DISCHARGE DIAGNOSES  Principal Problem:    Subacute osteomyelitis of right foot (HCC) POA: Unknown  Active  Problems:    Type 2 diabetes mellitus with hyperglycemia, without long-term current use of insulin (HCC) POA: Unknown    Septic arthritis of right foot (HCC) POA: Unknown    Normocytic anemia POA: Unknown    Acute hip pain, left POA: Unknown  Resolved Problems:    * No resolved hospital problems. *      FOLLOW UP  No future appointments.  No follow-up provider specified.    MEDICATIONS ON DISCHARGE     Medication List      START taking these medications      Instructions   Alcohol Swabs   Doctor's comments:  Per formulary preference. ICD-10 code: E11.65 Uncontrolled type 2 Diabetes Mellitus  Wipe site with prep pad prior to injection.     amoxicillin-clavulanate 875-125 MG Tabs  Commonly known as:  AUGMENTIN   Take 1 Tab by mouth 2 times a day for 42 days.  Dose:  1 Tab     Blood Glucose Test Strips   Doctor's comments:  Or per formulary preference. ICD-10 code: E11.65 Uncontrolled type 2 Diabetes Mellitus  Use one True Metrix strip to test blood sugar twice daily.     cyclobenzaprine 5 MG tablet  Commonly known as:  FLEXERIL   Take 1 Tab by mouth 2 Times a Day.  Dose:  5 mg     insulin glargine 100 UNIT/ML Sopn injection  Generic drug:  insulin glargine   Inject 10 Units as instructed every evening.  Dose:  10 Units     Insulin Pen Needle 31G X 5 MM Misc   1 Each by Does not apply route every bedtime.  Dose:  1 Each     Lancets   Doctor's comments:  Or per formulary preference. ICD-10 code: E11.65 Uncontrolled type 2 Diabetes Mellitus  Use one True Metrix lancet to test blood sugar twice daily.     lisinopril 2.5 MG Tabs  Commonly known as:  PRINIVIL   Take 1 Tab by mouth every day.  Dose:  2.5 mg     metFORMIN 850 MG Tabs  Commonly known as:  GLUCOPHAGE   Take 1 Tab by mouth 2 times a day, with meals.  Dose:  850 mg     oxyCODONE immediate-release 5 MG Tabs  Commonly known as:  ROXICODONE   Take 1 Tab by mouth every 6 hours as needed for up to 7 days.  Dose:  5 mg            Allergies  No Known  Allergies    DIET  Orders Placed This Encounter   Procedures   • Diet Order Diabetic     Standing Status:   Standing     Number of Occurrences:   1     Order Specific Question:   Diet:     Answer:   Diabetic [3]       ACTIVITY  Light duty.  Activities as tolerated.    CONSULTATIONS  Orthopedics  Infectious disease    PROCEDURES  Ray amputation of right great toe    LABORATORY  Lab Results   Component Value Date    SODIUM 133 (L) 04/21/2020    POTASSIUM 4.1 04/21/2020    CHLORIDE 99 04/21/2020    CO2 25 04/21/2020    GLUCOSE 164 (H) 04/21/2020    BUN 21 04/21/2020    CREATININE 0.85 04/21/2020        Lab Results   Component Value Date    WBC 9.0 04/21/2020    HEMOGLOBIN 12.8 (L) 04/21/2020    HEMATOCRIT 37.5 (L) 04/21/2020    PLATELETCT 465 (H) 04/21/2020        Total time of the discharge process exceeds 38 minutes.

## 2020-04-21 NOTE — DISCHARGE PLANNING
Received Choice form at 1500  Agency/Facility Name: [facility]  Referral sent per Choice form @ 5830

## 2020-04-21 NOTE — FACE TO FACE
Face to Face Note  -  Durable Medical Equipment    Eladio Copeland M.D. - NPI: [id number]  I certify that this patient is under my care and that they had a durable medical equipment(DME)face to face encounter by myself that meets the physician DME face-to-face encounter requirements with this patient on:    Date of encounter:   Patient:                    MRN:                       YOB: 2018  Quoc Guillermo  [id number]  1975     The encounter with the patient was in whole, or in part, for the following medical condition, which is the primary reason for durable medical equipment:  Post-Op Surgery    I certify that, based on my findings, the following durable medical equipment is medically necessary:  Walkers.    HOME O2 Saturation Measurements:(Values must be present for Home Oxygen orders)         ,     ,         My Clinical findings support the need for the above equipment due to:  Abnormal Gait    Supporting Symptoms: abnormal gait as evaluated by physical therapy

## 2020-04-21 NOTE — PROGRESS NOTES
Diabetes education: Met with pt this afternoon. Pt was given and instructed on a True metrix meter and finger sticks. Pt state he as done them before and practiced with nursing.  Pt was taught to use insulin pens, practiced with saline pens and discussed insulin storage, shelf life and site rotation. Handout given. Reviewed metformin, basaglar and low blood sugar.  Tiger text Dr. Copeland to change Lantus to Basaglar (per insurance) and add true metrix test strips and  Lancets.  Tiger text sent to Roger[facility] to beds. Questions answered . Please call [phone] if needs change.
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
## 2020-04-21 NOTE — DISCHARGE PLANNING
Medication reconcilliation completed. Medications delivered to patient at bedside. Patient counseled.     Quoc Guillermo   Home Medication Instructions MAX:[id number]    Printed on:04/21/20 3445   Medication Information                      Alcohol Swabs  Wipe site with prep pad prior to injection.             amoxicillin-clavulanate (AUGMENTIN) 875-125 MG Tab  Take 1 Tab by mouth 2 times a day for 42 days.             Blood Glucose Test Strips  Use one True Metrix strip to test blood sugar twice daily.             cyclobenzaprine (FLEXERIL) 5 MG tablet  Take 1 Tab by mouth 2 Times a Day.             insulin glargine (INSULIN GLARGINE) 100 UNIT/ML Solution Pen-injector injection  Inject 10 Units as instructed every evening.             Insulin Pen Needle 31G X 5 MM Misc  1 Each by Does not apply route every bedtime.             Lancets  Use one True Metrix lancet to test blood sugar twice daily.             lisinopril (PRINIVIL) 2.5 MG Tab  Take 1 Tab by mouth every day.             metFORMIN (GLUCOPHAGE) 850 MG Tab  Take 1 Tab by mouth 2 times a day, with meals.             oxyCODONE immediate-release (ROXICODONE) 5 MG Tab  Take 1 Tab by mouth every 6 hours as needed for up to 7 days.

## 2020-04-21 NOTE — DISCHARGE INSTRUCTIONS
Discharge Instructions    Discharged to home by car with friend. Discharged via wheelchair, hospital escort: Yes.  Special equipment needed: Not Applicable    Be sure to schedule a follow-up appointment with your primary care doctor or any specialists as instructed.     Discharge Plan:   Diet Plan: Discussed  Activity Level: Discussed  Smoking Cessation Offered: Patient Refused  Confirmed Follow up Appointment: Patient to Call and Schedule Appointment  Confirmed Symptoms Management: Discussed  Medication Reconciliation Updated: Yes  Influenza Vaccine Indication: Patient Refuses    I understand that a diet low in cholesterol, fat, and sodium is recommended for good health. Unless I have been given specific instructions below for another diet, I accept this instruction as my diet prescription.   Other diet: regular    Special Instructions: Discharge instructions for the Orthopedic Patient    Follow up with Primary Care Physician within 2 weeks of discharge to home, regarding:  Review of medications and diagnostic testing.  Surveillance for medical complications.  Workup and treatment of osteoporosis, if appropriate.     -Is this a Hip/Knee/Shoulder Joint Replacement patient? No    -Is this patient being discharged with medication to prevent blood clots?  No    · Is patient discharged on Warfarin / Coumadin?   No     Depression / Suicide Risk    As you are discharged from this [facility], it is important to learn how to keep safe from harming yourself.    Recognize the warning signs:  · Abrupt changes in personality, positive or negative- including increase in energy   · Giving away possessions  · Change in eating patterns- significant weight changes-  positive or negative  · Change in sleeping patterns- unable to sleep or sleeping all the time   · Unwillingness or inability to communicate  · Depression  · Unusual sadness, discouragement and loneliness  · Talk of wanting to die  · Neglect of personal  appearance   · Rebelliousness- reckless behavior  · Withdrawal from people/activities they love  · Confusion- inability to concentrate     If you or a loved one observes any of these behaviors or has concerns about self-harm, here's what you can do:  · Talk about it- your feelings and reasons for harming yourself  · Remove any means that you might use to hurt yourself (examples: pills, rope, extension cords, firearm)  · Get professional help from the community (Mental Health, Substance Abuse, psychological counseling)  · Do not be alone:Call your Safe Contact- someone whom you trust who will be there for you.  · Call your local CRISIS HOTLINE 748-5992 or 848-201-9602  · Call your local Children's Mobile Crisis Response Team [place] (669) 051-9747 or www.[employer]  · Call the toll free National Suicide Prevention Hotlines   · National Suicide Prevention Lifeline 966-073-VJUF (3294)  · National Hope Line Network 800-SUICIDE (775-6793)

## 2020-04-22 ENCOUNTER — PATIENT OUTREACH (OUTPATIENT)
Dept: HEALTH INFORMATION MANAGEMENT | Facility: OTHER | Age: 45
End: 2020-04-22

## 2020-04-22 LAB
BACTERIA SPEC ANAEROBE CULT: ABNORMAL
SIGNIFICANT IND 70042: ABNORMAL
SITE SITE: ABNORMAL
SOURCE SOURCE: ABNORMAL

## 2020-04-23 ENCOUNTER — PATIENT OUTREACH (OUTPATIENT)
Dept: HEALTH INFORMATION MANAGEMENT | Facility: OTHER | Age: 45
End: 2020-04-23

## 2020-04-23 NOTE — PROGRESS NOTES
[facility] received incoming call from Jonathon and OFELIA Thacker. Keller reports doing well after d/c. All medications have been picked up. No side effects. Jonathon reports PCP is Pam Vegas from [facility]. No follow up appmnt yet. Jonathon and Kirstie reports will make one when needed. Educated Jonathon on the importance of follow ups. He reports appmnt with Infectious disease scheduled for tomorrow. Jonathon interested in food-is rx. [facility] will deliver a food bag to his home on 4/28. Jonathon declined to speak to [facility] RN for health education, but would like to speak to [facility] SW for resources for financial assistance. Jonathon reports no other needs from CHW. I will do a warm hand off to NINA Trujillo. Advised Keller to reach out to me in the future when needed. Keller will be d/c from [facility] services.    Plan: Warm handoff to NINA Trujillo.

## 2020-04-24 NOTE — PROGRESS NOTES
COMMUNITY CARE MANAGEMENT SOCIAL WORK ASSESSMENT    NARRATIVE:    [facility] referral specifying social need of supportive resources-welfare, unemployment, and SSi/SSD. SW to make contact with Pt to engage, assess and enroll in SW-based services.    This is a 45 y.o. male here with right great toe pain and callus development.  Patient is found to be a diabetic.  He was started on diabetic management at this point in time.  Patient will need outpatient follow-ups with infectious disease as well as orthopedics.  Patient otherwise at this point is stable and can be discharged home to continue his management as an outpatient.  Patient will need follow-ups with his primary care physician regarding his diabetic management as well.    Pt stated goal(s) for SW involvement: Pt has the following unmet needs: supplemental income, unemployed, and medical issues.                    Pt agreeable to the following services and referrals:  SSI/SSD, Welfare Office.    Collaterals: Social Security Office, Welfare Office, Dept. of employment and Training, Unemployment website,       INFORMATION SOURCE:        Patient  ORIENTATION:         x4  WHO IS RESPONSIBLE FOR MAKING DECISIONS FOR PATIENT?  Patient  SDOH:  Primary care provider:        Unknown  Lives with:  Support Systems:         Spouse  Housing/Facility:         Apartment        Ability to obtain and take medication as prescribed:    Compliant  Reasons why not taking medications:      Not Applicable  Mobility issues:         None Disclosed  Prior services (list)         Unknown  Durable Medical Equipment:       None  Transportation barriers:        None  Financial barriers:         Unemployed  Source of income:         None  Prescription coverage:        [place] Medicaid        PSYCOLOGICAL ASSESSMENT  History of substance abuse:       None Disclosed  History of psychiatric issues:       None Disclosed  DISCHARGE RISKS/BARRIERS:        None  ANTICIPATED  DISCHARGE DISPOSITION (home, shelter, tent, etc.)  Home  PATIENT GOALS   Pt seeking Unemployment, Social Welfare Assistance, and SSi/SSD.

## 2020-05-08 ENCOUNTER — HOSPITAL ENCOUNTER (OUTPATIENT)
Dept: LAB | Facility: MEDICAL CENTER | Age: 45
End: 2020-05-08
Attending: NURSE PRACTITIONER
Payer: MEDICAID

## 2020-05-08 ENCOUNTER — OFFICE VISIT (OUTPATIENT)
Dept: WOUND CARE | Facility: MEDICAL CENTER | Age: 45
End: 2020-05-08
Attending: NURSE PRACTITIONER
Payer: MEDICAID

## 2020-05-08 ENCOUNTER — OFFICE VISIT (OUTPATIENT)
Dept: INFECTIOUS DISEASES | Facility: MEDICAL CENTER | Age: 45
End: 2020-05-08
Payer: MEDICAID

## 2020-05-08 VITALS
OXYGEN SATURATION: 99 % | BODY MASS INDEX: 25.01 KG/M2 | HEART RATE: 99 BPM | WEIGHT: 165 LBS | TEMPERATURE: 99.8 F | DIASTOLIC BLOOD PRESSURE: 76 MMHG | HEIGHT: 68 IN | SYSTOLIC BLOOD PRESSURE: 112 MMHG

## 2020-05-08 DIAGNOSIS — M00.871: ICD-10-CM

## 2020-05-08 DIAGNOSIS — M86.671 CHRONIC OSTEOMYELITIS OF RIGHT FOOT (HCC): ICD-10-CM

## 2020-05-08 DIAGNOSIS — A49.1 GROUP B STREPTOCOCCAL INFECTION: ICD-10-CM

## 2020-05-08 DIAGNOSIS — T81.89XA NON-HEALING SURGICAL WOUND, INITIAL ENCOUNTER: ICD-10-CM

## 2020-05-08 DIAGNOSIS — A49.8 B. FRAGILIS INFECTION: ICD-10-CM

## 2020-05-08 DIAGNOSIS — Z89.411 HISTORY OF PARTIAL RAY AMPUTATION OF FIRST TOE OF RIGHT FOOT (HCC): ICD-10-CM

## 2020-05-08 DIAGNOSIS — F15.11 METHAMPHETAMINE ABUSE IN REMISSION (HCC): ICD-10-CM

## 2020-05-08 DIAGNOSIS — E11.65 TYPE 2 DIABETES MELLITUS WITH HYPERGLYCEMIA, WITHOUT LONG-TERM CURRENT USE OF INSULIN (HCC): ICD-10-CM

## 2020-05-08 DIAGNOSIS — A49.8 METHICILLIN SUSCEPTIBLE STAPHYLOCOCCUS EPIDERMIDIS INFECTION: ICD-10-CM

## 2020-05-08 LAB
ALBUMIN SERPL BCP-MCNC: 4.1 G/DL (ref 3.2–4.9)
ALBUMIN/GLOB SERPL: 1.1 G/DL
ALP SERPL-CCNC: 149 U/L (ref 30–99)
ALT SERPL-CCNC: 21 U/L (ref 2–50)
ANION GAP SERPL CALC-SCNC: 15 MMOL/L (ref 7–16)
AST SERPL-CCNC: 16 U/L (ref 12–45)
BILIRUB SERPL-MCNC: 0.3 MG/DL (ref 0.1–1.5)
BUN SERPL-MCNC: 14 MG/DL (ref 8–22)
CALCIUM SERPL-MCNC: 10.3 MG/DL (ref 8.5–10.5)
CHLORIDE SERPL-SCNC: 97 MMOL/L (ref 96–112)
CO2 SERPL-SCNC: 24 MMOL/L (ref 20–33)
CREAT SERPL-MCNC: 0.79 MG/DL (ref 0.5–1.4)
CRP SERPL HS-MCNC: 0.25 MG/DL (ref 0–0.75)
ERYTHROCYTE [SEDIMENTATION RATE] IN BLOOD BY WESTERGREN METHOD: 20 MM/HOUR (ref 0–15)
GLOBULIN SER CALC-MCNC: 3.8 G/DL (ref 1.9–3.5)
GLUCOSE SERPL-MCNC: 110 MG/DL (ref 65–99)
POTASSIUM SERPL-SCNC: 4.1 MMOL/L (ref 3.6–5.5)
PROT SERPL-MCNC: 7.9 G/DL (ref 6–8.2)
SODIUM SERPL-SCNC: 136 MMOL/L (ref 135–145)

## 2020-05-08 PROCEDURE — 99214 OFFICE O/P EST MOD 30 MIN: CPT | Performed by: NURSE PRACTITIONER

## 2020-05-08 PROCEDURE — 11042 DBRDMT SUBQ TIS 1ST 20SQCM/<: CPT

## 2020-05-08 PROCEDURE — 99211 OFF/OP EST MAY X REQ PHY/QHP: CPT

## 2020-05-08 PROCEDURE — 36415 COLL VENOUS BLD VENIPUNCTURE: CPT

## 2020-05-08 PROCEDURE — 86140 C-REACTIVE PROTEIN: CPT

## 2020-05-08 PROCEDURE — 85652 RBC SED RATE AUTOMATED: CPT

## 2020-05-08 PROCEDURE — 80053 COMPREHEN METABOLIC PANEL: CPT

## 2020-05-08 PROCEDURE — 99213 OFFICE O/P EST LOW 20 MIN: CPT | Mod: 25 | Performed by: NURSE PRACTITIONER

## 2020-05-08 RX ORDER — CALCIUM CITRATE/VITAMIN D3 200MG-6.25
TABLET ORAL
COMMUNITY
Start: 2020-04-21 | End: 2022-01-21

## 2020-05-08 RX ORDER — OXYCODONE HYDROCHLORIDE 5 MG/1
TABLET ORAL
COMMUNITY
Start: 2020-05-06 | End: 2020-05-21

## 2020-05-08 RX ORDER — ALBUTEROL SULFATE 90 UG/1
AEROSOL, METERED RESPIRATORY (INHALATION)
COMMUNITY
Start: 2020-04-10 | End: 2022-01-21

## 2020-05-08 ASSESSMENT — ENCOUNTER SYMPTOMS
MYALGIAS: 1
NAUSEA: 1
FEVER: 0
VOMITING: 0
DIARRHEA: 0
COUGH: 0
NERVOUS/ANXIOUS: 0
SHORTNESS OF BREATH: 0
HEADACHES: 0
ABDOMINAL PAIN: 0
SENSORY CHANGE: 1
CHILLS: 0
CONSTIPATION: 0
SORE THROAT: 0
WEAKNESS: 0

## 2020-05-08 ASSESSMENT — FIBROSIS 4 INDEX: FIB4 SCORE: 0.22

## 2020-05-08 NOTE — PROGRESS NOTES
Infectious Disease Clinic    Subjective:     Chief Complaint   Patient presents with   • Hospital Follow-up     Right great toe osteomyelitis     This is my first time meeting Mr. Guillermo.      Interval History: 45 y.o. male  with a PMH of type II diabetes and methamphetamine abuse (patient stating last use was approximately 1 year ago).  Hospitalized from 4/15-4/21/2020, admitted with right great toe pain and swelling x1 week with a foul smelling discharge.  Patient stated that he had a callus on his right foot since a few weeks PTA.    Approximately 1 week prior to admit he noted subjective fevers and chills, for which he took a shower and soaked his foot.  He noted that the callus fell off and since that time he had worsening pain and swelling.  Tried to soak his foot in Epsom salt with some medication at home and was wrapping it 3 times a day, but as it was not getting better.  He went to [facility] where he was treated with IV antibiotics.  He was discharged on the Monday PTA with amoxicillin 500 mg 3 times daily.  Did not have any improvement in his pain with the antibiotics and thus presented to [facility] ED.  On presentation he was afebrile with mild leukocytosis.  X-ray of the foot showed soft tissue swelling of the right great toe with gas and bony reabsorption consistent with osteomyelitis and septic arthritis.  Underwent first ray amputation with disarticulation at the PIP and percutaneous tendon Achilles lengthening on 4/17 with Dr. Robledo.  OR bone culture positive B fragilis, group B strep and MSSE.  OR tissue culture positive  group B strep, B fragilis and Finegoldia magna.  Pathology of the right great toe showed underlying chronic osteomyelitis, no definitive acute osteomyelitis.  Of note, pathology was of the right great toe, nothing noted specifically to the margin.  Discharged home on p.o. Augmentin x6 weeks for residual osteomyelitis.     Hospital records reviewed    Today, 5/8/2020: Patient  reports feeling well and has been tolerating the p.o. Augmentin with minimal adverse effect.  Has had some nausea, but denies any vomiting with the antibiotics.  Denies feeling generally ill, fevers/chills, general malaise, headache, diarrhea, abdominal pain, chest pain or shortness of breath.  Pt stating that the RLE surgical sites are healing well.  He was seen by Dr. Robledo this past Wednesday, sutures were removed at this time and Steri-Strips were placed.  He denies any active drainage, no redness to the surrounding tissue.  He does have intermittent pain to the right first ray amp site and Achilles along the sites of the tendon transfer, states it is a 8/10, described as a sharp shooting sensation that is alleviated with rest and pain medication.  Believes his blood sugar was 121 this a.m., typically they are under the 120s.    Review of Systems   Constitutional: Negative for chills, fever and malaise/fatigue.   HENT: Negative for congestion and sore throat.    Respiratory: Negative for cough and shortness of breath.    Cardiovascular: Negative for chest pain and leg swelling.   Gastrointestinal: Positive for nausea. Negative for abdominal pain, constipation, diarrhea and vomiting.   Genitourinary: Negative for dysuria.   Musculoskeletal: Positive for myalgias. Negative for joint pain.   Skin: Negative for rash.   Neurological: Positive for sensory change. Negative for weakness and headaches.   Psychiatric/Behavioral: The patient is not nervous/anxious.        Past Medical History:   Diagnosis Date   • Diabetes (Formerly [facility])    • Methamphetamine abuse (Formerly [facility]) 05/08/2020    Reports last use about 1 year ago     Social History     Tobacco Use   • Smoking status: Current Every Day Smoker     Packs/day: 0.25     Types: Cigarettes   • Smokeless tobacco: Never Used   Substance Use Topics   • Alcohol use: Not Currently   • Drug use: Not Currently     Comment: clean x1 ye       Allergies: Patient has no known allergies.    Pt's  "medication and problem list reviewed.     Objective:     /76 (BP Location: Right arm, Patient Position: Sitting, BP Cuff Size: Adult)   Pulse 99   Temp 37.7 °C (99.8 °F) (Temporal)   Ht 1.727 m (5' 8\")   Wt 74.8 kg (165 lb)   SpO2 99%   BMI 25.09 kg/m²     Physical Exam   Constitutional: He is oriented to person, place, and time and well-developed, well-nourished, and in no distress. No distress.   HENT:   Head: Normocephalic and atraumatic.   Eyes: Pupils are equal, round, and reactive to light. Conjunctivae and EOM are normal. No scleral icterus.   Neck: Normal range of motion. Neck supple. No tracheal deviation present.   Cardiovascular: Normal rate, regular rhythm, normal heart sounds and intact distal pulses.   No murmur heard.  Pulmonary/Chest: Effort normal and breath sounds normal. No respiratory distress. He has no wheezes. He has no rales.   Abdominal: Soft. Bowel sounds are normal. He exhibits no distension. There is no abdominal tenderness. There is no rebound and no guarding.   Musculoskeletal:         General: Tenderness and deformity present. No edema.      Comments: Right great toe amp site-Steri-Strips in place with a minimal amount of necrotic tissue, no active drainage, trace erythema along incision, slightly tender to palpation.    Right Achilles surgical sites-well approximated, no erythema, no active drainage, slightly tender to palpation.   Lymphadenopathy:     He has no cervical adenopathy.   Neurological: He is alert and oriented to person, place, and time. No cranial nerve deficit. He exhibits abnormal muscle tone.   Skin: Skin is warm and dry. No rash noted. He is not diaphoretic. There is erythema.   Multiple diffuse tattoos and scarring throughout.   Psychiatric: Mood, memory, affect and judgment normal.   Pleasant   Vitals reviewed.    Assessment and Plan:   The following treatment plan was discussed with patient at length:    1. Chronic osteomyelitis of right foot (HCC)  " CMP  ESR  CRP    -Continue p.o. Augmentin 875/125 mg twice daily times a total of 6 weeks from surgery on 4/17, earliest end date 5/29/2020.  -CMP, ESR and CRP to monitor for hepato-nephrotoxicity and trend inflammatory markers.  -Continue with wound care as directed.  -Follow-up with Dr. Robledo as directed.   2. Arthritis of right foot due to other bacteria (HCC)      As above.   3. Methicillin susceptible Staphylococcus epidermidis infection      As above.   4. B. fragilis & Finegoldia manga infection      As above.   5. Group B streptococcal infection      As above.   6. Type 2 diabetes mellitus with hyperglycemia, without long-term current use of insulin (HCC)      Keep blood sugars under 150 to promote wound healing and control infection.     7. Methamphetamine abuse in remission (HCC)      Continue to abstain from all illicit drugs, places patient at risk for infection and/or death.     Follow up: 2 weeks, RTC sooner if needed. FU with PCP for ongoing chronic medical conditions.     Ksenia Childs A.P.R.N.    Case discussed with Dr. Gilliam from [facility] and JAIMIE Bynum with [facility].       Please note that this dictation was created using voice recognition software. I have  worked with technical experts from [facility] to optimize the interface.  I have made every reasonable attempt to correct obvious errors, but there may be errors of grammar and possibly content that I did not discover before finalizing the note.

## 2020-05-08 NOTE — PROGRESS NOTES
Pt seen during ortho rounds with LPS team for Right 1st ray amp site wounds.  Measurements(cm): see photo. Following physician assessment and debridement of wound, subcutaneous <20cm2, RN evaluated patient's wound and dressed with medihoney colloid covered with non-adhesive foam and secured with hypafix tape.  Tubigrib size D.

## 2020-05-09 NOTE — PROGRESS NOTES
LIMB PRESERVATION SERVICE ROUNDS    Patient seen in collaboration with interdisciplinary team during LPS rounds in wound clinic for right first ray amputation site.  Patient admitted from  4/15/20-4/21/2020 for right great toe diabetic foot infection that he first noticed on 4/9/2020.  He was seen by LPS, infectious disease, orthopedics.  X-ray positive for osteomyelitis and first MTH ulcer connected to first webspace with malodorous drainage.  Underwent right first ray amputation and right KENTON by Dr. Robledo on 4/17/2020.  He was placed in a splint postoperatively.  OR culture positive for strep B and staph epi.  Pathology positive for chronic osteomyelitis.  He discharged home with follow-up with Dr. Robledo.  Sutures have been removed to KENTON and right first ray amputation site.    Patient states blood sugar was 121 today  Denies fevers, chills, nausea, vomiting.        RESULTS:    Lab Results   Component Value Date/Time    HBA1C 8.6 (H) 04/15/2020 11:10 PM            OBJECTIVE FINDINGS:     Pulses:   Palpable pedal pulses    Wound:  Right first ray amputation: 90% black eschar primarily to distal aspect  Skin edema  No erythema  No odor  No purulent drainage      PROCEDURE   Excisional debridement performed by Dr. Gilliam.  See note for further detail.   wound care completed by Carol Daugherty RN, CWS, medihoney, nonadhesive foam, Hypafix tape, Tubigrip          PLAN:   Wound Care: start wound care at [facility]  Imaging/Labs: no further imaging/labs at this time  Offloading: Continue to wear offloading boot.  Instructed patient to wear boot at all times for the next 4 weeks until he is 6 weeks postop.  Antibiotics: Continue on Augmentin.  Followed by ID.  Surgery: No further plans for surgery  Referral: Patient to follow-up with foot and ankle surgeon regarding left foot EHL tendon and right foot drop      LPS Follow up: No further LPS appointments    Patient was seen for 15 minutes face to face excluding procedure time of  which > 50% of appointment time was spent on counseling and coordination of care regarding the above.      Please note that this dictation was created using voice recognition software. I have  worked with technical experts from [facility] to optimize the interface.  I have made every reasonable attempt to correct obvious errors, but there may be errors of grammar and possibly content that I did not discover before finalizing the note.

## 2020-05-20 ENCOUNTER — APPOINTMENT (OUTPATIENT)
Dept: INFECTIOUS DISEASES | Facility: MEDICAL CENTER | Age: 45
End: 2020-05-20
Payer: MEDICAID

## 2020-05-20 ASSESSMENT — ENCOUNTER SYMPTOMS
MYALGIAS: 1
CONSTIPATION: 0
HEADACHES: 0
SHORTNESS OF BREATH: 0
NAUSEA: 1
COUGH: 0
SORE THROAT: 0
VOMITING: 0
WEAKNESS: 0
CHILLS: 0
NERVOUS/ANXIOUS: 0
FEVER: 0
SENSORY CHANGE: 1
ABDOMINAL PAIN: 0
DIARRHEA: 0

## 2020-05-20 NOTE — PROGRESS NOTES
Infectious Disease Clinic    Subjective:     No chief complaint on file.    Interval History: 45 y.o. male  with a PMH of type II diabetes and methamphetamine abuse (patient stating last use was approximately 1 year ago).  Hospitalized from 4/15-4/21/2020, admitted with right great toe pain and swelling x1 week with a foul smelling discharge.  Patient stated that he had a callus on his right foot since a few weeks PTA.    Approximately 1 week prior to admit he noted subjective fevers and chills, for which he took a shower and soaked his foot.  He noted that the callus fell off and since that time he had worsening pain and swelling.  Tried to soak his foot in Epsom salt with some medication at home and was wrapping it 3 times a day, but as it was not getting better.  He went to [place] where he was treated with IV antibiotics.  He was discharged on the Monday PTA with amoxicillin 500 mg 3 times daily.  Did not have any improvement in his pain with the antibiotics and thus presented to [facility] ED.  On presentation he was afebrile with mild leukocytosis.  X-ray of the foot showed soft tissue swelling of the right great toe with gas and bony reabsorption consistent with osteomyelitis and septic arthritis.  Underwent first ray amputation with disarticulation at the PIP and percutaneous tendon Achilles lengthening on 4/17 with Dr. Robledo.  OR bone culture positive B fragilis, group B strep and MSSE.  OR tissue culture positive  group B strep, B fragilis and Finegoldia magna.  Pathology of the right great toe showed underlying chronic osteomyelitis, no definitive acute osteomyelitis.  Of note, pathology was of the right great toe, nothing noted specifically to the margin.  Discharged home on p.o. Augmentin x6 weeks for residual osteomyelitis.     5/8/2020: Seen by JAIMIE Cortez.  Pt stating that the RLE surgical sites are healing well.  He was seen by Dr. Robledo this past Wednesday, sutures were removed at this  time and Steri-Strips were placed.  He denies any active drainage, no redness to the surrounding tissue.  Continue p.o. Augmentin 875/125 mg twice daily times a total of 6 weeks from surgery on 4/17, earliest end date 5/29/2020.    Today, 5/20/2020:        Review of Systems   Constitutional: Negative for chills, fever and malaise/fatigue.   HENT: Negative for congestion and sore throat.    Respiratory: Negative for cough and shortness of breath.    Cardiovascular: Negative for chest pain and leg swelling.   Gastrointestinal: Positive for nausea. Negative for abdominal pain, constipation, diarrhea and vomiting.   Genitourinary: Negative for dysuria.   Musculoskeletal: Positive for myalgias. Negative for joint pain.   Skin: Negative for rash.   Neurological: Positive for sensory change. Negative for weakness and headaches.   Psychiatric/Behavioral: The patient is not nervous/anxious.        Past Medical History:   Diagnosis Date   • Diabetes ([facility])    • Methamphetamine abuse ([facility]) 05/08/2020    Reports last use about 1 year ago     Social History     Tobacco Use   • Smoking status: Current Every Day Smoker     Packs/day: 0.25     Types: Cigarettes   • Smokeless tobacco: Never Used   Substance Use Topics   • Alcohol use: Not Currently   • Drug use: Not Currently     Comment: clean x1 ye       Allergies: Patient has no known allergies.    Pt's medication and problem list reviewed.     Objective:     There were no vitals taken for this visit.    Physical Exam   Constitutional: He is oriented to person, place, and time and well-developed, well-nourished, and in no distress. No distress.   HENT:   Head: Normocephalic and atraumatic.   Eyes: Pupils are equal, round, and reactive to light. Conjunctivae and EOM are normal. No scleral icterus.   Neck: Normal range of motion. Neck supple. No tracheal deviation present.   Cardiovascular: Normal rate, regular rhythm, normal heart sounds and intact distal pulses.   No murmur  heard.  Pulmonary/Chest: Effort normal and breath sounds normal. No respiratory distress. He has no wheezes. He has no rales.   Abdominal: Soft. Bowel sounds are normal. He exhibits no distension. There is no abdominal tenderness. There is no rebound and no guarding.   Musculoskeletal:         General: Tenderness and deformity present. No edema.      Comments: Right great toe amp site-Steri-Strips in place with a minimal amount of necrotic tissue, no active drainage, trace erythema along incision, slightly tender to palpation.    Right Achilles surgical sites-well approximated, no erythema, no active drainage, slightly tender to palpation.   Lymphadenopathy:     He has no cervical adenopathy.   Neurological: He is alert and oriented to person, place, and time. No cranial nerve deficit. He exhibits abnormal muscle tone.   Skin: Skin is warm and dry. No rash noted. He is not diaphoretic. There is erythema.   Multiple diffuse tattoos and scarring throughout.   Psychiatric: Mood, memory, affect and judgment normal.   Pleasant   Vitals reviewed.    Labs:   Ref. Range 5/8/2020 09:37   Sed Rate Westergren Latest Ref Range: 0 - 15 mm/hour 20 (H)   Sodium Latest Ref Range: 135 - 145 mmol/L 136   Potassium Latest Ref Range: 3.6 - 5.5 mmol/L 4.1   Chloride Latest Ref Range: 96 - 112 mmol/L 97   Co2 Latest Ref Range: 20 - 33 mmol/L 24   Anion Gap Latest Ref Range: 7.0 - 16.0  15.0   Glucose Latest Ref Range: 65 - 99 mg/dL 110 (H)   Bun Latest Ref Range: 8 - 22 mg/dL 14   Creatinine Latest Ref Range: 0.50 - 1.40 mg/dL 0.79   GFR If  Latest Ref Range: >60 mL/min/1.73 m 2 >60   GFR If Non  Latest Ref Range: >60 mL/min/1.73 m 2 >60   Calcium Latest Ref Range: 8.5 - 10.5 mg/dL 10.3   AST(SGOT) Latest Ref Range: 12 - 45 U/L 16   ALT(SGPT) Latest Ref Range: 2 - 50 U/L 21   Alkaline Phosphatase Latest Ref Range: 30 - 99 U/L 149 (H)   Total Bilirubin Latest Ref Range: 0.1 - 1.5 mg/dL 0.3   Albumin Latest  Ref Range: 3.2 - 4.9 g/dL 4.1   Total Protein Latest Ref Range: 6.0 - 8.2 g/dL 7.9   Globulin Latest Ref Range: 1.9 - 3.5 g/dL 3.8 (H)   A-G Ratio Latest Units: g/dL 1.1   Stat C-Reactive Protein Latest Ref Range: 0.00 - 0.75 mg/dL 0.25     Assessment and Plan:   The following treatment plan was discussed with patient at length:    1. Chronic osteomyelitis of right foot (HCC)     2. Arthritis of right foot due to other bacteria (HCC)     3. Methicillin susceptible Staphylococcus epidermidis infection     4. B. fragilis & Finegoldia manga infection     5. Group B streptococcal infection     6. Type 2 diabetes mellitus with hyperglycemia, without long-term current use of insulin (HCC)     7. Methamphetamine abuse in remission (HCC)         1. Chronic osteomyelitis of right foot (HCC)  CMP  ESR  CRP    --CMP, ESR and CRP to monitor for hepato-nephrotoxicity and trend inflammatory markers.  -Continue with wound care as directed.  -Follow-up with Dr. Robledo as directed.   2. Arthritis of right foot due to other bacteria (HCC)      As above.   3. Methicillin susceptible Staphylococcus epidermidis infection      As above.   4. B. fragilis & Finegoldia manga infection      As above.   5. Group B streptococcal infection      As above.   6. Type 2 diabetes mellitus with hyperglycemia, without long-term current use of insulin (HCC)      Keep blood sugars under 150 to promote wound healing and control infection.     7. Methamphetamine abuse in remission ([facility])      Continue to abstain from all illicit drugs, places patient at risk for infection and/or death.     Follow up: *** weeks, RTC sooner if needed. FU with PCP for ongoing chronic medical conditions.     CHINMAY Caruso.       Please note that this dictation was created using voice recognition software. I have  worked with technical experts from [employer] to optimize the interface.  I have made every reasonable attempt to correct obvious errors, but there  may be errors of grammar and possibly content that I did not discover before finalizing the note.

## 2020-05-21 ENCOUNTER — OFFICE VISIT (OUTPATIENT)
Dept: INFECTIOUS DISEASES | Facility: MEDICAL CENTER | Age: 45
End: 2020-05-21
Payer: MEDICAID

## 2020-05-21 VITALS
WEIGHT: 162 LBS | TEMPERATURE: 98.9 F | HEART RATE: 98 BPM | OXYGEN SATURATION: 98 % | BODY MASS INDEX: 24.55 KG/M2 | HEIGHT: 68 IN | DIASTOLIC BLOOD PRESSURE: 80 MMHG | SYSTOLIC BLOOD PRESSURE: 120 MMHG

## 2020-05-21 DIAGNOSIS — A49.8 METHICILLIN SUSCEPTIBLE STAPHYLOCOCCUS EPIDERMIDIS INFECTION: ICD-10-CM

## 2020-05-21 DIAGNOSIS — M86.671 CHRONIC OSTEOMYELITIS OF RIGHT FOOT (HCC): ICD-10-CM

## 2020-05-21 DIAGNOSIS — Z72.0 TOBACCO ABUSE: ICD-10-CM

## 2020-05-21 DIAGNOSIS — M00.871: ICD-10-CM

## 2020-05-21 DIAGNOSIS — A49.8 B. FRAGILIS INFECTION: ICD-10-CM

## 2020-05-21 DIAGNOSIS — F15.11 METHAMPHETAMINE ABUSE IN REMISSION (HCC): ICD-10-CM

## 2020-05-21 DIAGNOSIS — E11.65 TYPE 2 DIABETES MELLITUS WITH HYPERGLYCEMIA, WITHOUT LONG-TERM CURRENT USE OF INSULIN (HCC): ICD-10-CM

## 2020-05-21 DIAGNOSIS — A49.1 GROUP B STREPTOCOCCAL INFECTION: ICD-10-CM

## 2020-05-21 PROCEDURE — 99213 OFFICE O/P EST LOW 20 MIN: CPT | Performed by: NURSE PRACTITIONER

## 2020-05-21 RX ORDER — AMOXICILLIN AND CLAVULANATE POTASSIUM 875; 125 MG/1; MG/1
1 TABLET, FILM COATED ORAL 2 TIMES DAILY
Qty: 28 TAB | Refills: 0 | Status: SHIPPED | OUTPATIENT
Start: 2020-05-21 | End: 2020-06-04

## 2020-05-21 RX ORDER — QUETIAPINE FUMARATE 100 MG/1
TABLET, FILM COATED ORAL
COMMUNITY
Start: 2020-05-13 | End: 2022-01-21

## 2020-05-21 RX ORDER — LITHIUM CARBONATE 150 MG/1
CAPSULE ORAL
COMMUNITY
Start: 2020-05-13 | End: 2020-08-28

## 2020-05-21 ASSESSMENT — ENCOUNTER SYMPTOMS
SHORTNESS OF BREATH: 0
NAUSEA: 0
DIAPHORESIS: 0
NERVOUS/ANXIOUS: 0
SENSORY CHANGE: 0
WEAKNESS: 0
PALPITATIONS: 0
HEADACHES: 0
ABDOMINAL PAIN: 0
VOMITING: 0
SORE THROAT: 0
CHILLS: 0
MYALGIAS: 0
COUGH: 0
FEVER: 0
DIARRHEA: 0
CONSTIPATION: 0

## 2020-05-21 ASSESSMENT — FIBROSIS 4 INDEX: FIB4 SCORE: 0.32

## 2020-05-21 NOTE — PROGRESS NOTES
[facility]    Subjective:     Chief Complaint   Patient presents with   • Follow-Up     Chronic osteomyelitis of right foot (HCC)      Interval History: 45 y.o. male  with a PMH of type II diabetes and methamphetamine abuse (patient stating last use was approximately 1 year ago).  Hospitalized from 4/15-4/21/2020, admitted with right great toe pain and swelling x1 week with a foul smelling discharge.  Patient stated that he had a callus on his right foot since a few weeks PTA.    Approximately 1 week prior to admit he noted subjective fevers and chills, for which he took a shower and soaked his foot.  He noted that the callus fell off and since that time he had worsening pain and swelling.  Tried to soak his foot in Epsom salt with some medication at home and was wrapping it 3 times a day, but as it was not getting better.  He went to [place] where he was treated with IV antibiotics.  He was discharged on the Monday PTA with amoxicillin 500 mg 3 times daily.  Did not have any improvement in his pain with the antibiotics and thus presented to [facility] ED.  On presentation, he was afebrile with mild leukocytosis.  X-ray of the foot showed soft tissue swelling of the right great toe with gas and bony reabsorption consistent with osteomyelitis and septic arthritis.  Underwent first ray amputation with disarticulation at the PIP and percutaneous tendon Achilles lengthening on 4/17 with Dr. Robledo.  OR bone culture positive B fragilis, group B strep and MSSE.  OR tissue culture positive  group B strep, B fragilis and Finegoldia magna.  Pathology of the right great toe showed underlying chronic osteomyelitis, no definitive acute osteomyelitis.  Of note, pathology was of the right great toe, nothing noted specifically to the margin.  Discharged home on p.o. Augmentin x6 weeks for residual osteomyelitis.     5/8/2020: Seen by JAIMIE Cortez.  Seen at [facility].  Pt stating that the RLE surgical sites are  healing well.  He was seen by Dr. Robledo this past Wednesday, sutures were removed at this time and Steri-Strips were placed.  He denies any active drainage, no redness to the surrounding tissue.  Continue p.o. Augmentin 875/125 mg twice daily times a total of 6 weeks from surgery on 4/17, earliest end date 5/29/2020.    Today, 5/21/2020: Has been tolerating the p.o. Augmentin without issue.  Denies feeling generally ill, fevers/chills, general malaise, headache, n/v/d, abdominal pain, chest pain or shortness of breath.  Missed his last wound care appointment and due to the taxi getting him there late.  Unfortunately, wound care cannot get him in for another 2 weeks.  Has been changing the dressings himself every couple of days, denies any drainage or odor with dressing changes.  States he does not have any pain to his right foot.  But sugars are typically around 100 in the mornings.  Continues to smoke 4 to 6 cigarettes daily.    Review of Systems   Constitutional: Negative for chills, diaphoresis, fever and malaise/fatigue.   HENT: Negative for congestion and sore throat.    Respiratory: Negative for cough and shortness of breath.    Cardiovascular: Negative for chest pain, palpitations and leg swelling.   Gastrointestinal: Negative for abdominal pain, constipation, diarrhea, nausea and vomiting.   Genitourinary: Negative for dysuria.   Musculoskeletal: Negative for joint pain and myalgias.   Skin: Negative for itching and rash.   Neurological: Negative for sensory change, weakness and headaches.   Psychiatric/Behavioral: The patient is not nervous/anxious.        Past Medical History:   Diagnosis Date   • Diabetes ([facility])    • Methamphetamine abuse ([facility]) 05/08/2020    Reports last use about 1 year ago     Social History     Tobacco Use   • Smoking status: Current Every Day Smoker     Packs/day: 0.25     Types: Cigarettes   • Smokeless tobacco: Never Used   Substance Use Topics   • Alcohol use: Not Currently   •  "Drug use: Not Currently     Comment: clean x1 ye       Allergies: Patient has no known allergies.    Pt's medication and problem list reviewed.     Objective:     /80 (BP Location: Left arm, Patient Position: Sitting, BP Cuff Size: Adult)   Pulse 98   Temp 37.2 °C (98.9 °F) (Temporal)   Ht 1.727 m (5' 8\")   Wt 73.5 kg (162 lb)   SpO2 98%   BMI 24.63 kg/m²     Physical Exam   Constitutional: He is oriented to person, place, and time and well-developed, well-nourished, and in no distress. No distress.   HENT:   Head: Normocephalic and atraumatic.   Right Ear: External ear normal.   Left Ear: External ear normal.   Eyes: Pupils are equal, round, and reactive to light. EOM are normal. No scleral icterus.   Neck: Normal range of motion. Neck supple. No JVD present. No tracheal deviation present.   Cardiovascular: Normal rate, regular rhythm, normal heart sounds and intact distal pulses. Exam reveals no friction rub.   No murmur heard.  Pulmonary/Chest: Effort normal and breath sounds normal. No respiratory distress. He has no wheezes.   Abdominal: Soft. Bowel sounds are normal. He exhibits no distension. There is no abdominal tenderness.   Musculoskeletal:         General: Deformity and edema (Trace RLE) present. No tenderness.      Comments: Right great toe amp site-wound bed pink, mild maceration to surrounding tissue with skin lifting along wound edges, no erythema to surrounding tissue, no active drainage, no odor, nontender to palpation.    Right Achilles surgical sites- well-healed and approximated, no erythema, no active drainage, slightly tender to palpation.   Neurological: He is alert and oriented to person, place, and time. No cranial nerve deficit.   Wheelchair   Skin: Skin is warm. No rash noted. He is not diaphoretic. No erythema.   Multiple diffuse tattoos and scarring throughout.   Psychiatric: Mood, memory, affect and judgment normal.   Pleasant   Vitals reviewed.    Labs:   Ref. Range " 5/8/2020 09:37   Sed Rate Westergren Latest Ref Range: 0 - 15 mm/hour 20 (H)   Sodium Latest Ref Range: 135 - 145 mmol/L 136   Potassium Latest Ref Range: 3.6 - 5.5 mmol/L 4.1   Chloride Latest Ref Range: 96 - 112 mmol/L 97   Co2 Latest Ref Range: 20 - 33 mmol/L 24   Anion Gap Latest Ref Range: 7.0 - 16.0  15.0   Glucose Latest Ref Range: 65 - 99 mg/dL 110 (H)   Bun Latest Ref Range: 8 - 22 mg/dL 14   Creatinine Latest Ref Range: 0.50 - 1.40 mg/dL 0.79   GFR If  Latest Ref Range: >60 mL/min/1.73 m 2 >60   GFR If Non  Latest Ref Range: >60 mL/min/1.73 m 2 >60   Calcium Latest Ref Range: 8.5 - 10.5 mg/dL 10.3   AST(SGOT) Latest Ref Range: 12 - 45 U/L 16   ALT(SGPT) Latest Ref Range: 2 - 50 U/L 21   Alkaline Phosphatase Latest Ref Range: 30 - 99 U/L 149 (H)   Total Bilirubin Latest Ref Range: 0.1 - 1.5 mg/dL 0.3   Albumin Latest Ref Range: 3.2 - 4.9 g/dL 4.1   Total Protein Latest Ref Range: 6.0 - 8.2 g/dL 7.9   Globulin Latest Ref Range: 1.9 - 3.5 g/dL 3.8 (H)   A-G Ratio Latest Units: g/dL 1.1   Stat C-Reactive Protein Latest Ref Range: 0.00 - 0.75 mg/dL 0.25     Assessment and Plan:   The following treatment plan was discussed with patient at length:    1. Chronic osteomyelitis of right foot (HCC)  amoxicillin-clavulanate (AUGMENTIN) 875-125 MG Tab    Comp Metabolic Panel    Sed Rate    -Continue p.o. Augmentin for at least 2 additional weeks.  -Repeat CMP and ESR in about 10 days to monitor for hepato-nephrotoxicity and trend inflammatory markers.  -Follow-up with Dr. Robledo and wound care as directed.   2. Arthritis of right foot due to other bacteria (HCC)      As above   3. Methicillin susceptible Staphylococcus epidermidis infection      As above   4. B. fragilis & Finegoldia manga infection      As above   5. Group B streptococcal infection      As above   6. Type 2 diabetes mellitus with hyperglycemia, without long-term current use of insulin (HCC)      Keep blood sugars under  150 to promote wound healing and control infection.   7. Methamphetamine abuse in remission (HCC)      Continues to abstain from any illicit drugs.  Places patient at risk for infection and/or death.   8. Tobacco abuse      Cessation encouraged as it can hinder wound healing.     Follow up: 2 weeks, RTC sooner if needed. FU with PCP for ongoing chronic medical conditions.     CHINMAY Caruso.       Please note that this dictation was created using voice recognition software. I have  worked with technical experts from [facility] to optimize the interface.  I have made every reasonable attempt to correct obvious errors, but there may be errors of grammar and possibly content that I did not discover before finalizing the note.

## 2020-05-28 ENCOUNTER — OFFICE VISIT (OUTPATIENT)
Dept: WOUND CARE | Facility: MEDICAL CENTER | Age: 45
End: 2020-05-28
Attending: NURSE PRACTITIONER
Payer: MEDICAID

## 2020-05-28 VITALS
RESPIRATION RATE: 16 BRPM | TEMPERATURE: 97.1 F | SYSTOLIC BLOOD PRESSURE: 137 MMHG | HEART RATE: 93 BPM | DIASTOLIC BLOOD PRESSURE: 89 MMHG | OXYGEN SATURATION: 99 %

## 2020-05-28 DIAGNOSIS — M86.671 CHRONIC OSTEOMYELITIS OF RIGHT FOOT (HCC): ICD-10-CM

## 2020-05-28 DIAGNOSIS — T81.89XA NON-HEALING SURGICAL WOUND, INITIAL ENCOUNTER: ICD-10-CM

## 2020-05-28 DIAGNOSIS — L08.9 WOUND INFECTION: ICD-10-CM

## 2020-05-28 DIAGNOSIS — T14.8XXA WOUND INFECTION: ICD-10-CM

## 2020-05-28 DIAGNOSIS — T14.8XXA PAIN ASSOCIATED WITH WOUND: ICD-10-CM

## 2020-05-28 DIAGNOSIS — R52 PAIN ASSOCIATED WITH WOUND: ICD-10-CM

## 2020-05-28 PROCEDURE — 99213 OFFICE O/P EST LOW 20 MIN: CPT

## 2020-05-28 PROCEDURE — 11042 DBRDMT SUBQ TIS 1ST 20SQCM/<: CPT | Performed by: NURSE PRACTITIONER

## 2020-05-28 PROCEDURE — 99214 OFFICE O/P EST MOD 30 MIN: CPT | Mod: 25 | Performed by: NURSE PRACTITIONER

## 2020-05-28 PROCEDURE — 11042 DBRDMT SUBQ TIS 1ST 20SQCM/<: CPT

## 2020-05-28 ASSESSMENT — ENCOUNTER SYMPTOMS
VOMITING: 0
PALPITATIONS: 0
CONSTIPATION: 0
ROS SKIN COMMENTS: WOUND TO RIGHT FOOT
WEAKNESS: 0
DIZZINESS: 0
FEVER: 0
DIARRHEA: 0
CHILLS: 0
NAUSEA: 0
HEADACHES: 0

## 2020-05-28 NOTE — PATIENT INSTRUCTIONS
Reviewed POC, importance of keeping the secondary dressing dry and intact, smoking cessation, nutrition for wound healing, offloading, s/s of complications/infection, when to notify MD/go to ER.  Pt verbalized understanding to all.

## 2020-05-28 NOTE — PROGRESS NOTES
Provider Encounter- Full Thickness wound    HISTORY OF PRESENT ILLNESS  Wound History:    START OF CARE IN CLINIC: 5/28/2020    REFERRING PROVIDER: Jeanne Cheatham     WOUND- Full Thickness Wound   LOCATION: Right first ray amputation site   HISTORY: Patient with a history of diabetes and ulceration to right great toe this evolved into osteomyelitis patient reports he noticed this on approximately 4/9/2020.  He was admitted to [facility] from 4/15/2022 4/21/2020 x-ray at that time confirmed osteomyelitis he underwent a first ray amputation and right KENTON by Dr. Robledo on 4/17/2020.  Or culture positive for strep B and staph epi.  He was discharged home with follow-up with Dr. Robledo sutures were removed to KENTON and right first ray amputation site.  Patient was referred to [facility] by Jeanne Cheatham after follow-up at LPS rounds.  Was determined the patient that time needed to continue wound care from [facility] for open wound to right first ray amputation site.  During that LPS around visit Dr. Gilliam performed excisional debridement patient is referred to [facility] for continuation of care.    Pertinent Medical History: Osteomyelitis of right foot, diabetes type 2, normocytic anemia    TOBACCO USE: Patient reports that he smokes approximately 6 cigarettes a day    Patient's problem list, allergies, and current medications reviewed and updated in Epic    Interval History:  5/28/2020: Clinic visit with JAIMIE Baez. Patient states that they are feeling well today.  Patient denies fever, chills, nausea, vomiting, lightheadedness, dizziness, shortness of breath and chest pain.  Patient came into clinic today with significant amount of callus to periwound area.  This is removed with scissors and forceps.  Wound area debrided with curette C dressing application of below.      REVIEW OF SYSTEMS:   Review of Systems   Constitutional: Negative for chills and fever.   Cardiovascular: Negative for chest  pain, palpitations and leg swelling.   Gastrointestinal: Negative for constipation, diarrhea, nausea and vomiting.   Skin: Negative for itching and rash.        Wound to right foot   Neurological: Negative for dizziness, weakness and headaches.       PHYSICAL EXAMINATION:   /89   Pulse 93   Temp 36.2 °C (97.1 °F)   Resp 16   SpO2 99%     Physical Exam   Constitutional: He is oriented to person, place, and time and well-developed, well-nourished, and in no distress.   HENT:   Head: Normocephalic and atraumatic.   Eyes: Pupils are equal, round, and reactive to light.   Cardiovascular: Intact distal pulses.   Pulmonary/Chest: Effort normal. No respiratory distress. He has no wheezes.   Musculoskeletal:      Comments: Right first ray amputation   Neurological: He is alert and oriented to person, place, and time.   Skin: Skin is warm and dry. No erythema.   Full-thickness wound to right first ray amputation site.  See doc flowsheets   Psychiatric: Mood and affect normal.       WOUND ASSESSMENT     Wound 04/16/20 Foot;Toe, Hallux Right (Active)       Wound 04/17/20 Incision Leg Right (Active)       Wound 05/28/20 Diabetic Ulcer Foot Distal Right R hallux amputation site/ 1st MTH (Active)   Wound Image    05/28/20 1200   Site Assessment Pink;Red;Yellow 05/28/20 1200   Periwound Assessment Intact 05/28/20 1200   Margins Attached edges 05/28/20 1200   Closure Secondary intention 05/28/20 1200   Drainage Amount Moderate 05/28/20 1200   Drainage Description Serosanguineous 05/28/20 1200   Treatments Cleansed;Provider debridement;Topical Lidocaine 05/28/20 1200   Wound Cleansing Normal Saline Irrigation 05/28/20 1200   Periwound Protectant Moisture Barrier;Skin Protectant Wipes to Periwound 05/28/20 1200   Dressing Cleansing/Solutions Normal Saline 05/28/20 1200   Dressing Options Honey Alginate;Honey Gel;Hypafix Tape;Nonadhesive Foam 05/28/20 1200   Dressing Changed New 05/28/20 1200   Dressing Status Old drainage  05/28/20 1200   Dressing Change/Treatment Frequency Every 72 hrs, and As Needed 05/28/20 1200   Non-staged Wound Description Full thickness 05/28/20 1200   Wound Length (cm) 2.7 cm 05/28/20 1200   Wound Width (cm) 2.1 cm 05/28/20 1200   Wound Depth (cm) 0.3 cm 05/28/20 1200   Wound Surface Area (cm^2) 5.67 cm^2 05/28/20 1200   Wound Volume (cm^3) 1.7 cm^3 05/28/20 1200   Post-Procedure Length (cm) 3.3 cm 05/28/20 1200   Post-Procedure Width (cm) 1.7 cm 05/28/20 1200   Post-Procedure Depth (cm) 0.3 cm 05/28/20 1200   Post-Procedure Surface Area (cm^2) 5.61 cm^2 05/28/20 1200   Post-Procedure Volume (cm^3) 1.68 cm^3 05/28/20 1200   Wound Bed Granulation (%) 75 % 05/28/20 1200   Wound Bed Slough (%) 25 % 05/28/20 1200   Tunneling (cm) 0 cm 05/28/20 1200   Undermining (cm) 0 cm 05/28/20 1200   Wound Odor None 05/28/20 1200   Pulses Right;2+;DP 05/28/20 1200   Exposed Structures None 05/28/20 1200            PROCEDURE:   -2% viscous lidocaine applied topically to wound bed for approximately 5 minutes prior to debridement  -Curette used to debride wound bed.  Excisional debridement was performed to remove devitalized tissue until healthy, bleeding tissue was visualized.   Entire surface of wound, 5.61cm2 debrided.  Tissue debrided into the subcutaneous layer.    -Bleeding controlled with manual pressure.    -Wound care completed by wound RN, refer to flowsheet  -Patient tolerated the procedure well, without c/o pain or discomfort.       Pertinent Labs and Diagnostics:    Labs:     A1c:   Lab Results   Component Value Date/Time    HBA1C 8.6 (H) 04/15/2020 11:10 PM          IMAGING: DX-foot-complete 3+ dated 4/15/2020  Gas present within the soft tissues of the plantar aspect of the great toe and at the ball the foot.  Apparent bony destruction of the distal phalanx.  Potential erosion within the 1st interphalangeal joint.  Soft tissue swelling of great toe noted.  No fracture or dislocation.     IMPRESSION:     Soft tissue  swelling of the RIGHT great toe with gas present as well as a bony resorption of the distal tuft and interphalangeal joint consistent with osteomyelitis and septic arthritis.    VASCULAR STUDIES: N/A    LAST  WOUND CULTURE:  DATE : 4/17/2020 right great toe positive  Anaerobic Culture   Order: [id number] - Reflex for Order [id number]   Status:  Final result   Visible to patient:  Yes (MyChart) Next appt:  06/03/2020 at 11:00 AM in Infectious Diseases (Ksenia Childs, A.P.R.N.)   Specimen Information:  Tissue          Component  1mo ago   Significant Indicator  POSPositive  (POS)      Source  TISS     Site  Right great toe proximal phalan     Culture Result  Abnormal     Growth noted after further incubation, see below for   organism identification.     Culture Result  Abnormal     Bacteroides fragilis Group   Rare growth     Culture Result  Abnormal     Finegoldia magna   Light growth     Resulting Agency  [facility]                      ASSESSMENT AND PLAN:   1. Chronic osteomyelitis of right foot (HCC)  Comments: Patient underwent amputation of first ray to right foot with Dr. Robledo see note above    2. Non-healing surgical wound, initial encounter  -Excisional debridement of wound in clinic today, medically necessary to promote wound healing.  -Patient to return to clinic weekly for assessment and debridement  -Patient to change dressing 1-2 times per week in between clinic visits  -Significant rounded thick disc like callus to plantar aspect of first MTH removed with scissors and forceps  -Patient instructed that he needs to continue to wear his boot for the full duration of the 6 weeks and not continue to remove it.    3. Pain associated with wound  -2% viscous lidocaine applied topically to wound bed for approximately 5 minutes prior to debridement  -Patient tolerated procedure today with no complaints of discomfort.      4. Wound infection   Comments: Patient is being followed by infectious disease JAIMIE Mccormack  Amadeo she has placed the patient on Augmentin for an end date of 6/4/2020  -Signs or symptoms of infection at this clinic visit  -Continue to monitor for for signs and symptoms of infection at each additional clinic visit        PATIENT EDUCATION  - Importance of adequate nutrition for wound healing  -Advised to go to ER for any increased redness, swelling, drainage, or odor, or if patient develops fever, chills, nausea or vomiting.     25 min spent face to face with patient, >50% of time spent counseling, coordinating care, reviewing records, discussing POC, educating patient regarding wound healing and progression.  This time was spent in excess to procedure time.       Please note that this note may have been created using voice recognition software. I have worked with technical experts from [employer] to optimize the interface.  I have made every reasonable attempt to correct obvious errors, but there may be errors of grammar and possibly content that I did not discover before finalizing the note.    N

## 2020-06-03 ENCOUNTER — OFFICE VISIT (OUTPATIENT)
Dept: INFECTIOUS DISEASES | Facility: MEDICAL CENTER | Age: 45
End: 2020-06-03
Payer: MEDICAID

## 2020-06-03 VITALS
DIASTOLIC BLOOD PRESSURE: 70 MMHG | OXYGEN SATURATION: 98 % | BODY MASS INDEX: 24.55 KG/M2 | TEMPERATURE: 97.5 F | HEART RATE: 107 BPM | HEIGHT: 68 IN | WEIGHT: 162 LBS | SYSTOLIC BLOOD PRESSURE: 104 MMHG

## 2020-06-03 DIAGNOSIS — M00.871: ICD-10-CM

## 2020-06-03 DIAGNOSIS — F15.11 METHAMPHETAMINE ABUSE IN REMISSION (HCC): ICD-10-CM

## 2020-06-03 DIAGNOSIS — A49.1 GROUP B STREPTOCOCCAL INFECTION: ICD-10-CM

## 2020-06-03 DIAGNOSIS — Z72.0 TOBACCO ABUSE: ICD-10-CM

## 2020-06-03 DIAGNOSIS — A49.8 B. FRAGILIS INFECTION: ICD-10-CM

## 2020-06-03 DIAGNOSIS — M86.671 CHRONIC OSTEOMYELITIS OF RIGHT FOOT (HCC): ICD-10-CM

## 2020-06-03 DIAGNOSIS — A49.8 METHICILLIN SUSCEPTIBLE STAPHYLOCOCCUS EPIDERMIDIS INFECTION: ICD-10-CM

## 2020-06-03 DIAGNOSIS — E11.65 TYPE 2 DIABETES MELLITUS WITH HYPERGLYCEMIA, WITHOUT LONG-TERM CURRENT USE OF INSULIN (HCC): ICD-10-CM

## 2020-06-03 PROCEDURE — 99213 OFFICE O/P EST LOW 20 MIN: CPT | Performed by: NURSE PRACTITIONER

## 2020-06-03 ASSESSMENT — ENCOUNTER SYMPTOMS
HEADACHES: 0
TINGLING: 1
ABDOMINAL PAIN: 0
NAUSEA: 1
COUGH: 0
NERVOUS/ANXIOUS: 0
VOMITING: 0
FEVER: 0
MYALGIAS: 1
DIARRHEA: 0
SENSORY CHANGE: 0
CHILLS: 0
WEAKNESS: 0
SORE THROAT: 0
SHORTNESS OF BREATH: 0
CONSTIPATION: 0

## 2020-06-03 ASSESSMENT — FIBROSIS 4 INDEX: FIB4 SCORE: 0.32

## 2020-06-03 NOTE — PROGRESS NOTES
[facility] Disease [facility]    Subjective:     Chief Complaint   Patient presents with   • Follow-Up     Chronic osteomyelitis of right foot (HCC)      Interval History: 45 y.o. male  with a PMH of type II diabetes and methamphetamine abuse (patient stating last use was approximately 1 year ago).  Hospitalized from 4/15-4/21/2020, admitted with right great toe pain and swelling x1 week with a foul smelling discharge.  Patient stated that he had a callus on his right foot since a few weeks PTA.    Approximately 1 week prior to admit he noted subjective fevers and chills, for which he took a shower and soaked his foot.  He noted that the callus fell off and since that time he had worsening pain and swelling.  Tried to soak his foot in Epsom salt with some medication at home and was wrapping it 3 times a day, but as it was not getting better.  He went to [place] where he was treated with IV antibiotics.  He was discharged on the Monday PTA with amoxicillin 500 mg 3 times daily.  Did not have any improvement in his pain with the antibiotics and thus presented to [facility] ED.  On presentation, he was afebrile with mild leukocytosis.  X-ray of the foot showed soft tissue swelling of the right great toe with gas and bony reabsorption consistent with osteomyelitis and septic arthritis.  Underwent first ray amputation with disarticulation at the PIP and percutaneous tendon Achilles lengthening on 4/17 with Dr. Robledo.  OR bone culture positive B fragilis, group B strep and MSSE.  OR tissue culture positive  group B strep, B fragilis and Finegoldia magna.  Pathology of the right great toe showed underlying chronic osteomyelitis, no definitive acute osteomyelitis.  Of note, pathology was of the right great toe, nothing noted specifically to the margin.  Discharged home on p.o. Augmentin x6 weeks for residual osteomyelitis.     5/8/2020: Seen by JAIMIE Cortez.  Se[facility] at [facility].  Pt stating that the RLE surgical sites are  healing well.  He was seen by Dr. Robledo this past Wednesday, sutures were removed at this time and Steri-Strips were placed.  He denies any active drainage, no redness to the surrounding tissue.  Continue p.o. Augmentin 875/125 mg twice daily times a total of 6 weeks from surgery on 4/17, earliest end date 5/29/2020.    5/21/2020: Seen by JAIMIE Cortez.  Missed his last wound care appointment and due to the taxi getting him there late.  Unfortunately, wound care cannot get him in for another 2 weeks.  Has been changing the dressings himself every couple of days, denies any drainage or odor with dressing changes.  States he does not have any pain to his right foot.  Continue p.o. Augmentin for at least 2 additional weeks.    Today, 6/3/2020: Continues to tolerate the p.o. Augmentin with little issue.  States he has some nausea, but is alleviated with saltine crackers.  Denies feeling generally ill, fevers/chills, general malaise, headache, v/d, abdominal pain, chest pain or shortness of breath.  Was seen at wound care last week, plan is for him to continue wound care on a weekly basis.  He does change the dressing once in between appointments, states there is been minimal yellowish drainage without odor.  States he has had a little bit of tender burning sensation along the right Achilles, but for the most part he denies having any pain.  By Dr. Robledo last week, will be starting physical therapy soon.  Blood sugars in the 90-120s in the mornings.  Continues to smoke 5 cigarettes daily, but is working on cutting down.  Notes that he forgot to do his labs, but is planning on doing them tomorrow.       Review of Systems   Constitutional: Negative for chills, fever and malaise/fatigue.   HENT: Negative for congestion and sore throat.    Respiratory: Negative for cough and shortness of breath.    Cardiovascular: Negative for chest pain and leg swelling.   Gastrointestinal: Positive for nausea ([place]). Negative  "for abdominal pain, constipation, diarrhea and vomiting.   Genitourinary: Negative for dysuria.   Musculoskeletal: Positive for myalgias. Negative for joint pain.   Skin: Negative for rash.   Neurological: Positive for tingling. Negative for sensory change, weakness and headaches.   Psychiatric/Behavioral: The patient is not nervous/anxious.        Past Medical History:   Diagnosis Date   • Diabetes ([facility])    • Methamphetamine abuse ([facility]) 05/08/2020    Reports last use about 1 year ago     Social History     Tobacco Use   • Smoking status: Current Every Day Smoker     Packs/day: 0.25     Types: Cigarettes   • Smokeless tobacco: Never Used   Substance Use Topics   • Alcohol use: Not Currently   • Drug use: Not Currently     Comment: clean x1 ye       Allergies: Patient has no known allergies.    Pt's medication and problem list reviewed.     Objective:     /70 (BP Location: Right arm, Patient Position: Sitting, BP Cuff Size: Adult)   Pulse (!) 107   Temp 36.4 °C (97.5 °F) (Temporal)   Ht 1.727 m (5' 8\")   Wt 73.5 kg (162 lb)   SpO2 98%   BMI 24.63 kg/m²     Physical Exam   Constitutional: He is oriented to person, place, and time and well-developed, well-nourished, and in no distress. No distress.   HENT:   Head: Normocephalic and atraumatic.   Eyes: Pupils are equal, round, and reactive to light. Conjunctivae and EOM are normal. No scleral icterus.   Neck: Normal range of motion. Neck supple. No tracheal deviation present.   Cardiovascular: Normal rate, regular rhythm, normal heart sounds and intact distal pulses.   No murmur heard.  Pulmonary/Chest: Effort normal and breath sounds normal. No respiratory distress. He has no wheezes. He has no rales.   Abdominal: Soft. Bowel sounds are normal. He exhibits no distension. There is no abdominal tenderness.   Musculoskeletal:         General: Deformity present. No tenderness or edema.      Comments: Right great toe amp site-wound bed down to the size of a " nickel, wound bed pink with yellow eschar, no odor, minimal drainage, no erythema to surrounding tissue, normal maceration along posterior portion of incision, nontender to palpation.    Right Achilles surgical site-well-healed and approximated without any breakdown or drainage, no erythema to surrounding tissue, nontender to palpation.   Neurological: He is alert and oriented to person, place, and time. No cranial nerve deficit.   Wheelchair   Skin: Skin is warm. No rash noted. He is not diaphoretic. No erythema.   Multiple diffuse tattoos and scarring throughout.   Psychiatric: Mood, memory, affect and judgment normal.   Pleasant   Vitals reviewed.    Assessment and Plan:   The following treatment plan was discussed with patient at length:    1. Chronic osteomyelitis of right foot (HCC)      -Finish remaining course of p.o. Augmentin.  Do not feel additional antibiotics are warranted as there are no signs of active infection.  -Monitor for s/sx of worsening off abx: increased redness, pain, swelling, drainage, breakdown of surgical site, fevers, chills, general malaise, etc.  Notify ID or go to ER should these s/sx occur.  -CMP and ESR to be completed tomorrow to monitor for hepato-nephrotoxicity and trend inflammatory markers.  -Follow-up with Dr. Robledo as directed.  -Continue with wound care as directed.   2. Arthritis of right foot due to other bacteria ([facility])      As above.   3. Methicillin susceptible Staphylococcus epidermidis infection      As above.   4. B. fragilis & Finegoldia manga infection      As above.   5. Group B streptococcal infection      As above.   6. Type 2 diabetes mellitus with hyperglycemia, without long-term current use of insulin ([facility])      Keep blood sugars under 150 to promote wound healing and control infection.   7. Methamphetamine abuse in remission ([facility])      Continue to abstain from any illicit drug use as it places patient at high risk for infection and/or death.   8. Tobacco  abuse      Continues to smoke 5 cigarettes daily, notes that he is working on quitting.  Can hinder wound healing.     Follow up: PRN, RTC sooner if needed. FU with PCP for ongoing chronic medical conditions.     CHINMAY Caruso.       Please note that this dictation was created using voice recognition software. I have  worked with technical experts from [facility] to optimize the interface.  I have made every reasonable attempt to correct obvious errors, but there may be errors of grammar and possibly content that I did not discover before finalizing the note.

## 2020-06-04 ENCOUNTER — OFFICE VISIT (OUTPATIENT)
Dept: WOUND CARE | Facility: MEDICAL CENTER | Age: 45
End: 2020-06-04
Attending: NURSE PRACTITIONER
Payer: MEDICAID

## 2020-06-04 VITALS
TEMPERATURE: 98.5 F | OXYGEN SATURATION: 100 % | DIASTOLIC BLOOD PRESSURE: 86 MMHG | RESPIRATION RATE: 20 BRPM | HEART RATE: 101 BPM | SYSTOLIC BLOOD PRESSURE: 132 MMHG

## 2020-06-04 DIAGNOSIS — T81.89XD NONHEALING SURGICAL WOUND, SUBSEQUENT ENCOUNTER: Primary | ICD-10-CM

## 2020-06-04 DIAGNOSIS — T14.8XXA WOUND INFECTION: ICD-10-CM

## 2020-06-04 DIAGNOSIS — R52 PAIN ASSOCIATED WITH WOUND: ICD-10-CM

## 2020-06-04 DIAGNOSIS — T14.8XXA PAIN ASSOCIATED WITH WOUND: ICD-10-CM

## 2020-06-04 DIAGNOSIS — L08.9 WOUND INFECTION: ICD-10-CM

## 2020-06-04 PROCEDURE — 11042 DBRDMT SUBQ TIS 1ST 20SQCM/<: CPT | Performed by: NURSE PRACTITIONER

## 2020-06-04 PROCEDURE — 11042 DBRDMT SUBQ TIS 1ST 20SQCM/<: CPT

## 2020-06-04 ASSESSMENT — ENCOUNTER SYMPTOMS
ROS SKIN COMMENTS: WOUND TO RIGHT FOOT
DIZZINESS: 0
PALPITATIONS: 0
VOMITING: 0
HEADACHES: 0
CHILLS: 0
CONSTIPATION: 0
WEAKNESS: 0
FEVER: 0
NAUSEA: 0
DIARRHEA: 0

## 2020-06-04 NOTE — PROGRESS NOTES
Provider Encounter- Full Thickness wound    HISTORY OF PRESENT ILLNESS  Wound History:    START OF CARE IN CLINIC: 5/28/2020    REFERRING PROVIDER: Jeanne Cheatham     WOUND- Full Thickness Wound   LOCATION: Right first ray amputation site   HISTORY: Patient with a history of diabetes and ulceration to right great toe this evolved into osteomyelitis patient reports he noticed this on approximately 4/9/2020.  He was admitted to [facility] from 4/15/2022 4/21/2020 x-ray at that time confirmed osteomyelitis he underwent a first ray amputation and right KENTON by Dr. Robledo on 4/17/2020.  Or culture positive for strep B and staph epi.  He was discharged home with follow-up with Dr. Robledo sutures were removed to KENTON and right first ray amputation site.  Patient was referred to [facility] by Jeanne Cheatham after follow-up at LPS rounds.  Was determined the patient that time needed to continue wound care from [facility] for open wound to right first ray amputation site.  During that LPS around visit Dr. Gilliam performed excisional debridement patient is referred to [facility] for continuation of care.    Pertinent Medical History: Osteomyelitis of right foot, diabetes type 2, normocytic anemia    TOBACCO USE: Patient reports that he smokes approximately 6 cigarettes a day    Patient's problem list, allergies, and current medications reviewed and updated in Epic    Interval History:  5/28/2020: Clinic visit with JAIMIE Baez. Patient states that they are feeling well today.  Patient denies fever, chills, nausea, vomiting, lightheadedness, dizziness, shortness of breath and chest pain.  Patient came into clinic today with significant amount of callus to periwound area.  This is removed with scissors and forceps.  Wound area debrided with curette C dressing application of below.    6/4/2020: Clinic visit with JAIMIE Baez. Patient states that they are feeling well today.  Patient denies fever,  chills, nausea, vomiting, lightheadedness, dizziness, shortness of breath and chest pain.  Patient's wound is beginning to heal with granulation tissue and epithelialization to the superior aspect of the wound.  Patient does still have some fibrous connective tissue to the inferior aspect of the wound we will use honey alginate to try to atmolytically debride this nonviable tissue.      REVIEW OF SYSTEMS:   Review of Systems   Constitutional: Negative for chills and fever.   Cardiovascular: Negative for chest pain, palpitations and leg swelling.   Gastrointestinal: Negative for constipation, diarrhea, nausea and vomiting.   Skin: Negative for itching and rash.        Wound to right foot   Neurological: Negative for dizziness, weakness and headaches.       PHYSICAL EXAMINATION:   /86   Pulse (!) 101   Temp 36.9 °C (98.5 °F) (Temporal)   Resp 20   SpO2 100%     Physical Exam   Constitutional: He is oriented to person, place, and time and well-developed, well-nourished, and in no distress.   HENT:   Head: Normocephalic and atraumatic.   Eyes: Pupils are equal, round, and reactive to light.   Cardiovascular: Intact distal pulses.   Pulmonary/Chest: Effort normal. No respiratory distress. He has no wheezes.   Musculoskeletal:      Comments: Right first ray amputation   Neurological: He is alert and oriented to person, place, and time.   Skin: Skin is warm and dry. No erythema.   Full-thickness wound to right first ray amputation site.  See doc flowsheets   Psychiatric: Mood and affect normal.       WOUND ASSESSMENT      Wound 04/16/20 Foot;Toe, Hallux Right (Active)       Wound 04/17/20 Incision Leg Right (Active)       Wound 05/28/20 Diabetic Ulcer Foot Distal Right R hallux amputation site/ 1st MTH (Active)   Wound Image    06/04/20 1104   Site Assessment Pink;Yellow 06/04/20 1104   Periwound Assessment Intact 06/04/20 1104   Margins Attached edges 06/04/20 1104   Closure Secondary intention 05/28/20 1200    Drainage Amount Moderate 06/04/20 1104   Drainage Description Serosanguineous 06/04/20 1104   Treatments Cleansed;Topical Lidocaine;Provider debridement 06/04/20 1104   Wound Cleansing Normal Saline Irrigation 06/04/20 1104   Periwound Protectant Barrier Paste 06/04/20 1104   Dressing Cleansing/Solutions Normal Saline 05/28/20 1200   Dressing Options Honey Alginate;Nonadhesive Foam;Hypafix Tape;Tubigrip 06/04/20 1104   Dressing Changed New 05/28/20 1200   Dressing Status Old drainage 05/28/20 1200   Dressing Change/Treatment Frequency Every 72 hrs, and As Needed 05/28/20 1200   Non-staged Wound Description Full thickness 06/04/20 1104   Wound Length (cm) 2 cm 06/04/20 1104   Wound Width (cm) 1 cm 06/04/20 1104   Wound Depth (cm) 0.2 cm 06/04/20 1104   Wound Surface Area (cm^2) 2 cm^2 06/04/20 1104   Wound Volume (cm^3) 0.4 cm^3 06/04/20 1104   Post-Procedure Length (cm) 2.1 cm 06/04/20 1104   Post-Procedure Width (cm) 1 cm 06/04/20 1104   Post-Procedure Depth (cm) 0.2 cm 06/04/20 1104   Post-Procedure Surface Area (cm^2) 2.1 cm^2 06/04/20 1104   Post-Procedure Volume (cm^3) 0.42 cm^3 06/04/20 1104   Wound Healing % 76 06/04/20 1104   Wound Bed Granulation (%) 75 % 05/28/20 1200   Wound Bed Slough (%) 50 % 06/04/20 1104   Tunneling (cm) 0 cm 05/28/20 1200   Undermining (cm) 0 cm 05/28/20 1200   Wound Odor None 06/04/20 1104   Pulses Right;2+;DP 05/28/20 1200   Exposed Structures None 06/04/20 1104                   PROCEDURE:   -2% viscous lidocaine applied topically to wound bed for approximately 5 minutes prior to debridement  -Curette used to debride wound bed.  Excisional debridement was performed to remove devitalized tissue until healthy, bleeding tissue was visualized.   Entire surface of wound, 2.1 cm2 debrided.  Tissue debrided into the subcutaneous layer.    -Bleeding controlled with manual pressure.    -Wound care completed by wound RN, refer to flowsheet  -Patient tolerated the procedure well, without  c/o pain or discomfort.       Pertinent Labs and Diagnostics:    Labs:     A1c:   Lab Results   Component Value Date/Time    HBA1C 8.6 (H) 04/15/2020 11:10 PM          IMAGING: DX-foot-complete 3+ dated 4/15/2020  Gas present within the soft tissues of the plantar aspect of the great toe and at the ball the foot.  Apparent bony destruction of the distal phalanx.  Potential erosion within the 1st interphalangeal joint.  Soft tissue swelling of great toe noted.  No fracture or dislocation.     IMPRESSION:     Soft tissue swelling of the RIGHT great toe with gas present as well as a bony resorption of the distal tuft and interphalangeal joint consistent with osteomyelitis and septic arthritis.    VASCULAR STUDIES: N/A    LAST  WOUND CULTURE:  DATE : 4/17/2020 right great toe positive  Anaerobic Culture   Order: [id number] - Reflex for Order [id number]   Status:  Final result   Visible to patient:  Yes (MyChart) Next appt:  06/03/2020 at 11:00 AM in Infectious Diseases (Ksenia Childs, A.P.R.N.)   Specimen Information:  Tissue          Component  1mo ago   Significant Indicator  POSPositive  (POS)      Source  TISS     Site  Right great toe proximal phalan     Culture Result  Abnormal     Growth noted after further incubation, see below for   organism identification.     Culture Result  Abnormal     Bacteroides fragilis Group   Rare growth     Culture Result  Abnormal     Finegoldia magna   Light growth     Resulting Agency  [facility]                      ASSESSMENT AND PLAN:   1. Chronic osteomyelitis of right foot (HCC)  Comments: Patient underwent amputation of first ray to right foot with Dr. Robledo see note above    2. Non-healing surgical wound, initial encounter  -Excisional debridement of wound in clinic today, medically necessary to promote wound healing.  -Patient to return to clinic weekly for assessment and debridement  -Patient to change dressing 1-2 times per week in between clinic visits  -Patient instructed  that he needs to continue to wear his boot for the full duration of the 6 weeks and not continue to remove it.    3. Pain associated with wound  -2% viscous lidocaine applied topically to wound bed for approximately 5 minutes prior to debridement  -Patient tolerated procedure today with no complaints of discomfort.      4. Wound infection   Comments: Patient is being followed by infectious disease APRSARAH Childs she has placed the patient on Augmentin for an end date of 6/4/2020  -Signs or symptoms of infection at this clinic visit  -Continue to monitor for for signs and symptoms of infection at each additional clinic visit        PATIENT EDUCATION  - Importance of adequate nutrition for wound healing  -Advised to go to ER for any increased redness, swelling, drainage, or odor, or if patient develops fever, chills, nausea or vomiting.         Please note that this note may have been created using voice recognition software. I have worked with technical experts from [employer] to optimize the interface.  I have made every reasonable attempt to correct obvious errors, but there may be errors of grammar and possibly content that I did not discover before finalizing the note.    N

## 2020-06-11 ENCOUNTER — OFFICE VISIT (OUTPATIENT)
Dept: WOUND CARE | Facility: MEDICAL CENTER | Age: 45
End: 2020-06-11
Attending: NURSE PRACTITIONER
Payer: MEDICAID

## 2020-06-11 VITALS
SYSTOLIC BLOOD PRESSURE: 132 MMHG | TEMPERATURE: 98.2 F | DIASTOLIC BLOOD PRESSURE: 89 MMHG | HEART RATE: 107 BPM | RESPIRATION RATE: 18 BRPM | OXYGEN SATURATION: 97 %

## 2020-06-11 DIAGNOSIS — T14.8XXA WOUND INFECTION: ICD-10-CM

## 2020-06-11 DIAGNOSIS — L08.9 WOUND INFECTION: ICD-10-CM

## 2020-06-11 DIAGNOSIS — R52 PAIN ASSOCIATED WITH WOUND: ICD-10-CM

## 2020-06-11 DIAGNOSIS — T14.8XXA PAIN ASSOCIATED WITH WOUND: ICD-10-CM

## 2020-06-11 DIAGNOSIS — E11.65 TYPE 2 DIABETES MELLITUS WITH HYPERGLYCEMIA, WITHOUT LONG-TERM CURRENT USE OF INSULIN (HCC): ICD-10-CM

## 2020-06-11 DIAGNOSIS — T81.89XD NONHEALING SURGICAL WOUND, SUBSEQUENT ENCOUNTER: Primary | ICD-10-CM

## 2020-06-11 PROCEDURE — 11042 DBRDMT SUBQ TIS 1ST 20SQCM/<: CPT

## 2020-06-11 PROCEDURE — 11042 DBRDMT SUBQ TIS 1ST 20SQCM/<: CPT | Performed by: NURSE PRACTITIONER

## 2020-06-11 ASSESSMENT — ENCOUNTER SYMPTOMS
DIZZINESS: 0
FEVER: 0
HEADACHES: 0
DIARRHEA: 0
WEAKNESS: 0
PALPITATIONS: 0
CONSTIPATION: 0
CHILLS: 0
VOMITING: 0
ROS SKIN COMMENTS: WOUND TO RIGHT FOOT
NAUSEA: 0

## 2020-06-11 NOTE — PATIENT INSTRUCTIONS
-Keep dressings clean, dry and covered while bathing. Change dressings if they become over saturated, soiled or fall off; or once in between clinic visits.     -Avoid prolonged standing or sitting without elevating your legs.    -Never walk around the house barefoot. Always wear a rubber soled slipper when walking around the house.    -Should you experience any significant changes in your wound(s), such as infection (redness, swelling, localized heat, increased pain, fever > 101 F, chills) or have any questions regarding your home care instructions, please contact the wound center at [phone]. If after hours, contact your primary care physician or go to the hospital emergency room.

## 2020-06-11 NOTE — PROGRESS NOTES
Provider Encounter- Full Thickness wound    HISTORY OF PRESENT ILLNESS  Wound History:    START OF CARE IN CLINIC: 5/28/2020    REFERRING PROVIDER: Jeanne Cheatham     WOUND- Full Thickness Wound   LOCATION: Right first ray amputation site   HISTORY: Patient with a history of diabetes and ulceration to right great toe this evolved into osteomyelitis patient reports he noticed this on approximately 4/9/2020.  He was admitted to [facility] from 4/15/2022 4/21/2020 x-ray at that time confirmed osteomyelitis he underwent a first ray amputation and right KENTON by Dr. Robledo on 4/17/2020.  Or culture positive for strep B and staph epi.  He was discharged home with follow-up with Dr. Robledo sutures were removed to KENTON and right first ray amputation site.  Patient was referred to [facility] by Jeanne Cheatham after follow-up at LPS rounds.  Was determined the patient that time needed to continue wound care from [facility] for open wound to right first ray amputation site.  During that [facility] around visit Dr. Gilliam performed excisional debridement patient is referred to [facility] for continuation of care.    Pertinent Medical History: Osteomyelitis of right foot, diabetes type 2, normocytic anemia    DIABETES HX: Diagnosed with type 2 diabetes in April 2020 when he was hospitalized for foot ulcer.  He is currently managing with metformin.  Checks blood sugars newly and reports that these typically run around 100-120.  Was seen by diabetes educator in the hospital.  Does it have numbness in feet.  Usually wears regular, nonprescriptive shoes. Does not check his feet routinely.  Has had previous foot ulcers and has undergone a right first ray amputation.    TOBACCO USE: Patient reports that he smokes approximately 6 cigarettes a day    Patient's problem list, allergies, and current medications reviewed and updated in Epic    Interval History:  5/28/2020: Clinic visit with JAIMIE Baez. Patient states that they  are feeling well today.  Patient denies fever, chills, nausea, vomiting, lightheadedness, dizziness, shortness of breath and chest pain.  Patient came into clinic today with significant amount of callus to periwound area.  This is removed with scissors and forceps.  Wound area debrided with curette C dressing application of below.    6/4/2020: Clinic visit with JAIMIE Baez. Patient states that they are feeling well today.  Patient denies fever, chills, nausea, vomiting, lightheadedness, dizziness, shortness of breath and chest pain.  Patient's wound is beginning to heal with granulation tissue and epithelialization to the superior aspect of the wound.  Patient does still have some fibrous connective tissue to the inferior aspect of the wound we will use honey alginate to try to atmolytically debride this nonviable tissue.    6/11/2020 : Clinic visit with JAIMIE Wiggins.  Patient states he is feeling well, denies fevers, chills, nausea, vomiting, shortness of breath or cough.  He reports that his blood sugar this morning was 107.  He has been unable to wear offloading boot, states it causes him a lot of pain.  He is wearing regular shoes, and using a wheelchair for mobility.      REVIEW OF SYSTEMS:   Review of Systems   Constitutional: Negative for chills and fever.   Cardiovascular: Negative for chest pain, palpitations and leg swelling.   Gastrointestinal: Negative for constipation, diarrhea, nausea and vomiting.   Skin: Negative for itching and rash.        Wound to right foot   Neurological: Negative for dizziness, weakness and headaches.       PHYSICAL EXAMINATION:   There were no vitals taken for this visit.    Physical Exam   Constitutional: He is oriented to person, place, and time and well-developed, well-nourished, and in no distress.   HENT:   Head: Normocephalic and atraumatic.   Eyes: Pupils are equal, round, and reactive to light.   Cardiovascular: Intact distal pulses.   Pulmonary/Chest:  Effort normal. No respiratory distress. He has no wheezes.   Musculoskeletal:      Comments: Right first ray amputation   Neurological: He is alert and oriented to person, place, and time.   Skin: Skin is warm and dry. No erythema.   Full-thickness wound to right first ray amputation site.  See doc flowsheets   Psychiatric: Mood and affect normal.       WOUND ASSESSMENT          Wound 05/28/20 Diabetic Ulcer Foot Distal Right R hallux amputation site/ 1st MTH (Active)   Wound Image    06/11/20 1110   Site Assessment Pink;Yellow 06/11/20 1110   Periwound Assessment Intact;Maceration 06/11/20 1110   Margins Attached edges 06/11/20 1110   Closure Secondary intention 05/28/20 1200   Drainage Amount Moderate 06/11/20 1110   Drainage Description Serosanguineous 06/11/20 1110   Treatments Cleansed;Topical Lidocaine;Provider debridement 06/11/20 1110   Wound Cleansing Normal Saline Irrigation 06/11/20 1110   Periwound Protectant Skin Protectant Wipes to Periwound;Barrier Paste 06/11/20 1110   Dressing Cleansing/Solutions Normal Saline 05/28/20 1200   Dressing Options Honey Alginate;Nonadhesive Foam;Hypafix Tape 06/11/20 1110   Dressing Changed Changed 06/11/20 1110   Dressing Status Old drainage 05/28/20 1200   Dressing Change/Treatment Frequency Every 72 hrs, and As Needed 05/28/20 1200   Non-staged Wound Description Full thickness 06/11/20 1110   Wound Length (cm) 1.1 cm 06/11/20 1110   Wound Width (cm) 0.6 cm 06/11/20 1110   Wound Depth (cm) 0.2 cm 06/11/20 1110   Wound Surface Area (cm^2) 0.66 cm^2 06/11/20 1110   Wound Volume (cm^3) 0.13 cm^3 06/11/20 1110   Post-Procedure Length (cm) 1.5 cm 06/11/20 1110   Post-Procedure Width (cm) 0.7 cm 06/11/20 1110   Post-Procedure Depth (cm) 0.3 cm 06/11/20 1110   Post-Procedure Surface Area (cm^2) 1.05 cm^2 06/11/20 1110   Post-Procedure Volume (cm^3) 0.32 cm^3 06/11/20 1110   Wound Healing % 92 06/11/20 1110   Wound Bed Granulation (%) 40 % 06/11/20 1110   Wound Bed Epithelium  (%) 0 % 06/11/20 1110   Wound Bed Slough (%) 60 % 06/11/20 1110   Wound Bed Eschar (%) 0 % 06/11/20 1110   Tunneling (cm) 0 cm 06/11/20 1110   Undermining (cm) 0 cm 06/11/20 1110   Wound Odor None 06/11/20 1110   Pulses Right;2+;DP 05/28/20 1200   Exposed Structures None 06/11/20 1110         PROCEDURE: Excisional debridement of right hallux amputation site ulcer  -2% viscous lidocaine applied topically to wound bed for approximately 5 minutes prior to debridement  -Curette used to debride wound bed.  Excisional debridement was performed to remove devitalized tissue until healthy, bleeding tissue was visualized.   Entire surface of wound, 1.05 cm2 debrided.  Tissue debrided into the subcutaneous layer.    -Bleeding controlled with manual pressure.    -Wound care completed by wound RN, refer to flowsheet  -Patient tolerated the procedure well, without c/o pain or discomfort.       Pertinent Labs and Diagnostics:    Labs:     A1c:   Lab Results   Component Value Date/Time    HBA1C 8.6 (H) 04/15/2020 11:10 PM          IMAGING: DX-foot-complete 3+ dated 4/15/2020  Gas present within the soft tissues of the plantar aspect of the great toe and at the ball the foot.  Apparent bony destruction of the distal phalanx.  Potential erosion within the 1st interphalangeal joint.  Soft tissue swelling of great toe noted.  No fracture or dislocation.     IMPRESSION:     Soft tissue swelling of the RIGHT great toe with gas present as well as a bony resorption of the distal tuft and interphalangeal joint consistent with osteomyelitis and septic arthritis.    VASCULAR STUDIES: N/A    LAST  WOUND CULTURE:  DATE : 4/17/2020 right great toe positive  Anaerobic Culture   Order: [id number] - Reflex for Order [id number]   Status:  Final result   Visible to patient:  Yes (MyChart) Next appt:  06/03/2020 at 11:00 AM in Infectious Diseases (Ksenia Childs, A.P.R.N.)   Specimen Information:  Tissue          Component  1mo ago   Significant  Indicator  POSPositive  (POS)      Source  TISS     Site  Right great toe proximal phalan     Culture Result  Abnormal     Growth noted after further incubation, see below for   organism identification.     Culture Result  Abnormal     Bacteroides fragilis Group   Rare growth     Culture Result  Abnormal     Finegoldia magna   Light growth     Resulting Agency  M                      ASSESSMENT AND PLAN:   1. Non-healing surgical wound, subsequent encounter  Comments: Dehiscence of first ray amputation site    6/11/2020: Wound area has decreased since last assessment.  Patient unable to tolerate offloading boot, using wheelchair for mobility.  -Excisional debridement of wound in clinic today, medically necessary to promote wound healing.  -Patient to return to clinic weekly for assessment and debridement  -Patient to change dressing 1-2 times per week in between clinic visits  -Patient instructed that he needs to continue to offload his foot is much as possible  -Rx for orthotic shoes and inserts provided in clinic today.  Patient instructed to begin process as soon as possible    Wound care: Honey alginate to promote autolytic debridement , manage bioburden and exudate, adhesive foam cover dressing    2.  Type 2 diabetes mellitus with hyperglycemia, without long-term current use of insulin  Comments: Patient first diagnosed with type 2 diabetes while hospitalized in April 2020 for foot wound.  A1c 8.6    6/11/2020: Patient reports his blood sugar today was 107.  States he did receive diabetes education while in the hospital, but very briefly.  -Diabetes education ordered        3. Pain associated with wound    6/11/2020: Patient's sensation is intact in both feet  -2% viscous lidocaine applied topically to wound bed for approximately 5 minutes prior to debridement  -Patient tolerated procedure today with no complaints of discomfort.      4. Wound infection   Comments: Patient is being followed by infectious disease  JAIMIE Childs she has placed the patient on Augmentin, end date of 6/4/2020 6/11/2020: No signs or symptoms of infection at this clinic visit  -Continue to monitor for for signs and symptoms of infection at each additional clinic visit        PATIENT EDUCATION  - Importance of adequate nutrition for wound healing  -Advised to go to ER for any increased redness, swelling, drainage, or odor, or if patient develops fever, chills, nausea or vomiting.         Please note that this note may have been created using voice recognition software. I have worked with technical experts from [employer] to optimize the interface.  I have made every reasonable attempt to correct obvious errors, but there may be errors of grammar and possibly content that I did not discover before finalizing the note.    N

## 2020-06-18 ENCOUNTER — OFFICE VISIT (OUTPATIENT)
Dept: WOUND CARE | Facility: MEDICAL CENTER | Age: 45
End: 2020-06-18
Attending: NURSE PRACTITIONER
Payer: MEDICAID

## 2020-06-18 VITALS
HEART RATE: 94 BPM | DIASTOLIC BLOOD PRESSURE: 86 MMHG | OXYGEN SATURATION: 99 % | RESPIRATION RATE: 16 BRPM | TEMPERATURE: 97.8 F | SYSTOLIC BLOOD PRESSURE: 132 MMHG

## 2020-06-18 DIAGNOSIS — R52 PAIN ASSOCIATED WITH WOUND: ICD-10-CM

## 2020-06-18 DIAGNOSIS — L08.9 WOUND INFECTION: ICD-10-CM

## 2020-06-18 DIAGNOSIS — T81.89XD NONHEALING SURGICAL WOUND, SUBSEQUENT ENCOUNTER: Primary | ICD-10-CM

## 2020-06-18 DIAGNOSIS — E11.65 TYPE 2 DIABETES MELLITUS WITH HYPERGLYCEMIA, WITHOUT LONG-TERM CURRENT USE OF INSULIN (HCC): ICD-10-CM

## 2020-06-18 DIAGNOSIS — T14.8XXA WOUND INFECTION: ICD-10-CM

## 2020-06-18 DIAGNOSIS — T14.8XXA PAIN ASSOCIATED WITH WOUND: ICD-10-CM

## 2020-06-18 PROCEDURE — 11042 DBRDMT SUBQ TIS 1ST 20SQCM/<: CPT

## 2020-06-18 PROCEDURE — 11042 DBRDMT SUBQ TIS 1ST 20SQCM/<: CPT | Performed by: NURSE PRACTITIONER

## 2020-06-18 ASSESSMENT — ENCOUNTER SYMPTOMS
HEADACHES: 0
VOMITING: 0
DIARRHEA: 0
WEAKNESS: 0
CONSTIPATION: 0
PALPITATIONS: 0
NAUSEA: 0
ROS SKIN COMMENTS: WOUND TO RIGHT FOOT
FEVER: 0
CHILLS: 0
DIZZINESS: 0

## 2020-06-18 NOTE — PATIENT INSTRUCTIONS
-Keep dressings clean, dry and covered while bathing. Change dressings if they become over saturated, soiled or fall off; or once in between clinic visits.     -Avoid prolonged standing or sitting without elevating your legs.    -Never walk around the house barefoot. Always wear a rubber soled slipper when walking around the house.    -Should you experience any significant changes in your wound(s), such as infection (redness, swelling, localized heat, increased pain, fever > 101 F, chills) or have any questions regarding your home care instructions, please contact the wound center at [phone]. If after hours, contact your primary care physician or go to the hospital emergency room.

## 2020-06-18 NOTE — PROGRESS NOTES
Provider Encounter- Full Thickness wound    HISTORY OF PRESENT ILLNESS  Wound History:    START OF CARE IN CLINIC: 5/28/2020    REFERRING PROVIDER: Jeanne Cheatham     WOUND- Full Thickness Wound   LOCATION: Right first ray amputation site   HISTORY: Patient with a history of diabetes and ulceration to right great toe this evolved into osteomyelitis patient reports he noticed this on approximately 4/9/2020.  He was admitted to [facility] from 4/15/2022 4/21/2020 x-ray at that time confirmed osteomyelitis he underwent a first ray amputation and right KENTON by Dr. Robledo on 4/17/2020.  Or culture positive for strep B and staph epi.  He was discharged home with follow-up with Dr. Robledo sutures were removed to KENTON and right first ray amputation site.  Patient was referred to [facility] by Jeanne Cheatham after follow-up at LPS rounds.  Was determined the patient that time needed to continue wound care from [facility] for open wound to right first ray amputation site.  During that [facility] around visit Dr. Gilliam performed excisional debridement patient is referred to [facility] for continuation of care.    Pertinent Medical History: Osteomyelitis of right foot, diabetes type 2, normocytic anemia    DIABETES HX: Diagnosed with type 2 diabetes in April 2020 when he was hospitalized for foot ulcer.  He is currently managing with metformin.  Checks blood sugars newly and reports that these typically run around 100-120.  Was seen by diabetes educator in the hospital.  Does it have numbness in feet.  Usually wears regular, nonprescriptive shoes. Does not check his feet routinely.  Has had previous foot ulcers and has undergone a right first ray amputation.    TOBACCO USE: Patient reports that he smokes approximately 6 cigarettes a day    Patient's problem list, allergies, and current medications reviewed and updated in Epic    Interval History:  5/28/2020: Clinic visit with JAIMIE Baez. Patient states that they  are feeling well today.  Patient denies fever, chills, nausea, vomiting, lightheadedness, dizziness, shortness of breath and chest pain.  Patient came into clinic today with significant amount of callus to periwound area.  This is removed with scissors and forceps.  Wound area debrided with curette C dressing application of below.    6/4/2020: Clinic visit with JAIMIE Baez. Patient states that they are feeling well today.  Patient denies fever, chills, nausea, vomiting, lightheadedness, dizziness, shortness of breath and chest pain.  Patient's wound is beginning to heal with granulation tissue and epithelialization to the superior aspect of the wound.  Patient does still have some fibrous connective tissue to the inferior aspect of the wound we will use honey alginate to try to atmolytically debride this nonviable tissue.    6/11/2020 : Clinic visit with JAIMIE Wiggins.  Patient states he is feeling well, denies fevers, chills, nausea, vomiting, shortness of breath or cough.  He reports that his blood sugar this morning was 107.  He has been unable to wear offloading boot, states it causes him a lot of pain.  He is wearing regular shoes, and using a wheelchair for mobility.    6/18/2020: Clinic visit with JAIMIE Baez. Patient states that they are feeling well today.  Patient denies fever, chills, nausea, vomiting, lightheadedness, dizziness, shortness of breath and chest pain.  Removed fibrous/connective tissue from wound bed which was nonviable there is some granulation tissue formation to the wound bed.  Due to successful debridement we will hold honey alginate.      REVIEW OF SYSTEMS:   Review of Systems   Constitutional: Negative for chills and fever.   Cardiovascular: Negative for chest pain, palpitations and leg swelling.   Gastrointestinal: Negative for constipation, diarrhea, nausea and vomiting.   Skin: Negative for itching and rash.        Wound to right foot   Neurological:  Negative for dizziness, weakness and headaches.       PHYSICAL EXAMINATION:   /86   Pulse 94   Temp 36.6 °C (97.8 °F) (Temporal)   Resp 16   SpO2 99%     Physical Exam   Constitutional: He is oriented to person, place, and time and well-developed, well-nourished, and in no distress.   HENT:   Head: Normocephalic and atraumatic.   Eyes: Pupils are equal, round, and reactive to light.   Cardiovascular: Intact distal pulses.   Pulmonary/Chest: Effort normal. No respiratory distress. He has no wheezes.   Musculoskeletal:      Comments: Right first ray amputation   Neurological: He is alert and oriented to person, place, and time.   Skin: Skin is warm and dry. No erythema.   Full-thickness wound to right first ray amputation site.  See doc flowsheets   Psychiatric: Mood and affect normal.       WOUND ASSESSMENT                                                           Wound 04/16/20 Foot;Toe, Hallux Right (Active)       Wound 04/17/20 Incision Leg Right (Active)       Wound 05/28/20 Diabetic Ulcer Foot Distal Right R hallux amputation site/ 1st MTH (Active)   Wound Image    06/18/20 1100   Site Assessment Pink;Yellow 06/18/20 1100   Periwound Assessment Intact;Maceration 06/18/20 1100   Margins Attached edges 06/18/20 1100   Closure Secondary intention 05/28/20 1200   Drainage Amount Moderate 06/18/20 1100   Drainage Description Serosanguineous 06/18/20 1100   Treatments Cleansed;Topical Lidocaine;Provider debridement 06/18/20 1100   Wound Cleansing Normal Saline Irrigation 06/18/20 1100   Periwound Protectant Skin Protectant Wipes to Periwound;Barrier Paste 06/18/20 1100   Dressing Cleansing/Solutions Normal Saline 06/18/20 1100   Dressing Options Hydrofera Blue Ready;Hypafix Tape 06/18/20 1100   Dressing Changed New 06/18/20 1100   Dressing Status Old drainage 05/28/20 1200   Dressing Change/Treatment Frequency Every 72 hrs, and As Needed 05/28/20 1200   Non-staged Wound Description Full thickness 06/18/20  1100   Wound Length (cm) 1.1 cm 06/18/20 1100   Wound Width (cm) 0.5 cm 06/18/20 1100   Wound Depth (cm) 0.2 cm 06/18/20 1100   Wound Surface Area (cm^2) 0.55 cm^2 06/18/20 1100   Wound Volume (cm^3) 0.11 cm^3 06/18/20 1100   Post-Procedure Length (cm) 1.7 cm 06/18/20 1100   Post-Procedure Width (cm) 0.5 cm 06/18/20 1100   Post-Procedure Depth (cm) 0.3 cm 06/18/20 1100   Post-Procedure Surface Area (cm^2) 0.85 cm^2 06/18/20 1100   Post-Procedure Volume (cm^3) 0.26 cm^3 06/18/20 1100   Wound Healing % 94 06/18/20 1100   Wound Bed Granulation (%) 40 % 06/11/20 1110   Wound Bed Epithelium (%) 0 % 06/11/20 1110   Wound Bed Slough (%) 60 % 06/11/20 1110   Wound Bed Eschar (%) 0 % 06/11/20 1110   Tunneling (cm) 0 cm 06/18/20 1100   Undermining (cm) 0 cm 06/18/20 1100   Wound Odor None 06/18/20 1100   Pulses Right;2+;DP 05/28/20 1200   Exposed Structures None 06/18/20 1100         PROCEDURE: Excisional debridement of right hallux amputation site ulcer  -2% viscous lidocaine applied topically to wound bed for approximately 5 minutes prior to debridement  -Curette used to debride wound bed.  Excisional debridement was performed to remove devitalized tissue until healthy, bleeding tissue was visualized.   Entire surface of wound,  0.85 cm2 debrided.  Tissue debrided into the subcutaneous layer.    -Bleeding controlled with manual pressure.    -Wound care completed by wound RN, refer to flowsheet  -Patient tolerated the procedure well, without c/o pain or discomfort.       Pertinent Labs and Diagnostics:    Labs:     A1c:   Lab Results   Component Value Date/Time    HBA1C 8.6 (H) 04/15/2020 11:10 PM          IMAGING: DX-foot-complete 3+ dated 4/15/2020  Gas present within the soft tissues of the plantar aspect of the great toe and at the ball the foot.  Apparent bony destruction of the distal phalanx.  Potential erosion within the 1st interphalangeal joint.  Soft tissue swelling of great toe noted.  No fracture or  dislocation.     IMPRESSION:     Soft tissue swelling of the RIGHT great toe with gas present as well as a bony resorption of the distal tuft and interphalangeal joint consistent with osteomyelitis and septic arthritis.    VASCULAR STUDIES: N/A    LAST  WOUND CULTURE:  DATE : 4/17/2020 right great toe positive  Anaerobic Culture   Order: [id number] - Reflex for Order [id number]   Status:  Final result   Visible to patient:  Yes (MyChart) Next appt:  06/03/2020 at 11:00 AM in Infectious Diseases (Ksenia Childs, A.P.R.N.)   Specimen Information:  Tissue          Component  1mo ago   Significant Indicator  POSPositive  (POS)      Source  TISS     Site  Right great toe proximal phalan     Culture Result  Abnormal     Growth noted after further incubation, see below for   organism identification.     Culture Result  Abnormal     Bacteroides fragilis Group   Rare growth     Culture Result  Abnormal     Finegoldia magna   Light growth     Resulting Agency  M                      ASSESSMENT AND PLAN:   1. Non-healing surgical wound, subsequent encounter  Comments: Dehiscence of first ray amputation site    6/17/2020: Wound area has decreased since last assessment.  Patient unable to tolerate offloading boot, using wheelchair for mobility.  -Excisional debridement of wound in clinic today, medically necessary to promote wound healing.  -Patient to return to clinic weekly for assessment and debridement  -Patient to change dressing 1-2 times per week in between clinic visits  -Patient instructed that he needs to continue to offload his foot is much as possible  -Rx for orthotic shoes and inserts provided in clinic on 6/11/2020.  Follow-up with patient regarding acquiring orthotic shoes and inserts.    Wound care: Skin protectant, Hydrofera Blue, Hypafix tape    2.  Type 2 diabetes mellitus with hyperglycemia, without long-term current use of insulin  Comments: Patient first diagnosed with type 2 diabetes while hospitalized  in April 2020 for foot wound.  A1c 8.6    6/17/2020: Patient reports his blood sugar today was 179 patient reports that he had a few bowls of cereal before going to bed last night as well as sugar so high..  States he did receive diabetes education while in the hospital, but very briefly.  -Diabetes education ordered        3. Pain associated with wound    6/17/2020: Patient's sensation is intact in both feet  -2% viscous lidocaine applied topically to wound bed for approximately 5 minutes prior to debridement  -Patient tolerated procedure today with no complaints of discomfort.      4. Wound infection   Comments: Patient is being followed by infectious disease APRN Ksenia Childs she has placed the patient on Augmentin, end date of 6/4/2020 6/17/2020: No signs or symptoms of infection at this clinic visit  -Continue to monitor for for signs and symptoms of infection at each additional clinic visit        PATIENT EDUCATION  - Importance of adequate nutrition for wound healing  -Advised to go to ER for any increased redness, swelling, drainage, or odor, or if patient develops fever, chills, nausea or vomiting.         Please note that this note may have been created using voice recognition software. I have worked with technical experts from [employer] to optimize the interface.  I have made every reasonable attempt to correct obvious errors, but there may be errors of grammar and possibly content that I did not discover before finalizing the note.    N

## 2020-06-19 ENCOUNTER — PATIENT OUTREACH (OUTPATIENT)
Dept: HEALTH INFORMATION MANAGEMENT | Facility: OTHER | Age: 45
End: 2020-06-19

## 2020-06-25 ENCOUNTER — OFFICE VISIT (OUTPATIENT)
Dept: WOUND CARE | Facility: MEDICAL CENTER | Age: 45
End: 2020-06-25
Attending: NURSE PRACTITIONER
Payer: MEDICAID

## 2020-06-25 VITALS
DIASTOLIC BLOOD PRESSURE: 79 MMHG | RESPIRATION RATE: 16 BRPM | SYSTOLIC BLOOD PRESSURE: 114 MMHG | OXYGEN SATURATION: 98 % | HEART RATE: 98 BPM | TEMPERATURE: 96.9 F

## 2020-06-25 DIAGNOSIS — T14.8XXA WOUND INFECTION: ICD-10-CM

## 2020-06-25 DIAGNOSIS — T81.89XD NONHEALING SURGICAL WOUND, SUBSEQUENT ENCOUNTER: Primary | ICD-10-CM

## 2020-06-25 DIAGNOSIS — R52 PAIN ASSOCIATED WITH WOUND: ICD-10-CM

## 2020-06-25 DIAGNOSIS — T14.8XXA PAIN ASSOCIATED WITH WOUND: ICD-10-CM

## 2020-06-25 DIAGNOSIS — L08.9 WOUND INFECTION: ICD-10-CM

## 2020-06-25 DIAGNOSIS — E11.65 TYPE 2 DIABETES MELLITUS WITH HYPERGLYCEMIA, WITHOUT LONG-TERM CURRENT USE OF INSULIN (HCC): ICD-10-CM

## 2020-06-25 PROCEDURE — 11042 DBRDMT SUBQ TIS 1ST 20SQCM/<: CPT

## 2020-06-25 PROCEDURE — 11042 DBRDMT SUBQ TIS 1ST 20SQCM/<: CPT | Performed by: NURSE PRACTITIONER

## 2020-06-25 ASSESSMENT — ENCOUNTER SYMPTOMS
HEADACHES: 0
CONSTIPATION: 0
DIZZINESS: 0
NAUSEA: 0
WEAKNESS: 0
VOMITING: 0
DIARRHEA: 0
ROS SKIN COMMENTS: WOUND TO RIGHT FOOT
PALPITATIONS: 0
CHILLS: 0
FEVER: 0

## 2020-06-25 NOTE — PROGRESS NOTES
Provider Encounter- Full Thickness wound    HISTORY OF PRESENT ILLNESS  Wound History:    START OF CARE IN CLINIC: 5/28/2020    REFERRING PROVIDER: Jeanne Cheatham     WOUND- Full Thickness Wound   LOCATION: Right first ray amputation site   HISTORY: Patient with a history of diabetes and ulceration to right great toe this evolved into osteomyelitis patient reports he noticed this on approximately 4/9/2020.  He was admitted to [facility] from 4/15/2022 4/21/2020 x-ray at that time confirmed osteomyelitis he underwent a first ray amputation and right KENTON by Dr. Robledo on 4/17/2020.  Or culture positive for strep B and staph epi.  He was discharged home with follow-up with Dr. Robledo sutures were removed to KENTON and right first ray amputation site.  Patient was referred to [facility] by Jeanne Cheatham after follow-up at LPS rounds.  Was determined the patient that time needed to continue wound care from [facility] for open wound to right first ray amputation site.  During that [facility] around visit Dr. Gilliam performed excisional debridement patient is referred to [facility] for continuation of care.    Pertinent Medical History: Osteomyelitis of right foot, diabetes type 2, normocytic anemia    DIABETES HX: Diagnosed with type 2 diabetes in April 2020 when he was hospitalized for foot ulcer.  He is currently managing with metformin.  Checks blood sugars newly and reports that these typically run around 100-120.  Was seen by diabetes educator in the hospital.  Does it have numbness in feet.  Usually wears regular, nonprescriptive shoes. Does not check his feet routinely.  Has had previous foot ulcers and has undergone a right first ray amputation.    TOBACCO USE: Patient reports that he smokes approximately 6 cigarettes a day    Patient's problem list, allergies, and current medications reviewed and updated in Epic    Interval History:  5/28/2020: Clinic visit with JAIMIE Baez. Patient states that they  are feeling well today.  Patient denies fever, chills, nausea, vomiting, lightheadedness, dizziness, shortness of breath and chest pain.  Patient came into clinic today with significant amount of callus to periwound area.  This is removed with scissors and forceps.  Wound area debrided with curette C dressing application of below.    6/4/2020: Clinic visit with JAIMIE Baez. Patient states that they are feeling well today.  Patient denies fever, chills, nausea, vomiting, lightheadedness, dizziness, shortness of breath and chest pain.  Patient's wound is beginning to heal with granulation tissue and epithelialization to the superior aspect of the wound.  Patient does still have some fibrous connective tissue to the inferior aspect of the wound we will use honey alginate to try to atmolytically debride this nonviable tissue.    6/11/2020 : Clinic visit with JAIMIE Wiggins.  Patient states he is feeling well, denies fevers, chills, nausea, vomiting, shortness of breath or cough.  He reports that his blood sugar this morning was 107.  He has been unable to wear offloading boot, states it causes him a lot of pain.  He is wearing regular shoes, and using a wheelchair for mobility.    6/18/2020: Clinic visit with JAIMIE Baez. Patient states that they are feeling well today.  Patient denies fever, chills, nausea, vomiting, lightheadedness, dizziness, shortness of breath and chest pain.  Removed fibrous/connective tissue from wound bed which was nonviable there is some granulation tissue formation to the wound bed.  Due to successful debridement we will hold honey alginate.    6/25/2020: Clinic visit with JAIMIE Baez. Patient states that they are feeling well today.  Patient denies fever, chills, nausea, vomiting, lightheadedness, dizziness, shortness of breath and chest pain.  Wound is progressing has increased granulation tissue there is no residual fibrous tissue to wound bed any  longer.      REVIEW OF SYSTEMS:   Review of Systems   Constitutional: Negative for chills and fever.   Cardiovascular: Negative for chest pain, palpitations and leg swelling.   Gastrointestinal: Negative for constipation, diarrhea, nausea and vomiting.   Skin: Negative for itching and rash.        Wound to right foot   Neurological: Negative for dizziness, weakness and headaches.       PHYSICAL EXAMINATION:   /79   Pulse 98   Temp 36.1 °C (96.9 °F)   Resp 16   SpO2 98%     Physical Exam   Constitutional: He is oriented to person, place, and time and well-developed, well-nourished, and in no distress.   HENT:   Head: Normocephalic and atraumatic.   Eyes: Pupils are equal, round, and reactive to light.   Cardiovascular: Intact distal pulses.   Pulmonary/Chest: Effort normal. No respiratory distress. He has no wheezes.   Musculoskeletal:      Comments: Right first ray amputation   Neurological: He is alert and oriented to person, place, and time.   Skin: Skin is warm and dry. No erythema.   Full-thickness wound to right first ray amputation site.  See doc flowsheets   Psychiatric: Mood and affect normal.       WOUND ASSESSMENT       Wound 04/16/20 Foot;Toe, Hallux Right (Active)       Wound 04/17/20 Incision Leg Right (Active)       Wound 05/28/20 Diabetic Ulcer Foot Distal Right R hallux amputation site/ 1st MTH (Active)   Wound Image    06/25/20 1100   Site Assessment Pink;Yellow 06/25/20 1100   Periwound Assessment Intact;Maceration 06/25/20 1100   Margins Attached edges 06/25/20 1100   Closure Secondary intention 06/25/20 1100   Drainage Amount Moderate 06/25/20 1100   Drainage Description Serosanguineous 06/25/20 1100   Treatments Cleansed;Provider debridement;Topical Lidocaine 06/25/20 1100   Wound Cleansing Normal Saline Irrigation 06/25/20 1100   Periwound Protectant Skin Protectant Wipes to Periwound;Barrier Paste 06/25/20 1100   Dressing Cleansing/Solutions Normal Saline 06/25/20 1100   Dressing  Options Hydrofera Blue Ready;Hypafix Tape 06/25/20 1100   Dressing Changed Changed 06/25/20 1100   Dressing Status Old drainage 05/28/20 1200   Dressing Change/Treatment Frequency Every 72 hrs, and As Needed 05/28/20 1200   Non-staged Wound Description Full thickness 06/25/20 1100   Wound Length (cm) 1.3 cm 06/25/20 1100   Wound Width (cm) 0.6 cm 06/25/20 1100   Wound Depth (cm) 0.2 cm 06/25/20 1100   Wound Surface Area (cm^2) 0.78 cm^2 06/25/20 1100   Wound Volume (cm^3) 0.16 cm^3 06/25/20 1100   Post-Procedure Length (cm) 2 cm 06/25/20 1100   Post-Procedure Width (cm) 1 cm 06/25/20 1100   Post-Procedure Depth (cm) 0.4 cm 06/25/20 1100   Post-Procedure Surface Area (cm^2) 2 cm^2 06/25/20 1100   Post-Procedure Volume (cm^3) 0.8 cm^3 06/25/20 1100   Wound Healing % 91 06/25/20 1100   Wound Bed Granulation (%) 40 % 06/11/20 1110   Wound Bed Epithelium (%) 0 % 06/11/20 1110   Wound Bed Slough (%) 60 % 06/11/20 1110   Wound Bed Eschar (%) 0 % 06/11/20 1110   Tunneling (cm) 0 cm 06/25/20 1100   Undermining (cm) 0 cm 06/25/20 1100   Wound Odor None 06/25/20 1100   Pulses Right;2+;DP 05/28/20 1200   Exposed Structures None 06/25/20 1100            PROCEDURE: Excisional debridement of right hallux amputation site ulcer  -2% viscous lidocaine applied topically to wound bed for approximately 5 minutes prior to debridement  -Curette used to debride wound bed.  Excisional debridement was performed to remove devitalized tissue until healthy, bleeding tissue was visualized.   Entire surface of wound,  2 cm2 debrided.  Tissue debrided into the subcutaneous layer.    -Bleeding controlled with manual pressure.    -Wound care completed by wound RN, refer to flowsheet  -Patient tolerated the procedure well, without c/o pain or discomfort.       Pertinent Labs and Diagnostics:    Labs:     A1c:   Lab Results   Component Value Date/Time    HBA1C 8.6 (H) 04/15/2020 11:10 PM          IMAGING: DX-foot-complete 3+ dated 4/15/2020  Gas  present within the soft tissues of the plantar aspect of the great toe and at the ball the foot.  Apparent bony destruction of the distal phalanx.  Potential erosion within the 1st interphalangeal joint.  Soft tissue swelling of great toe noted.  No fracture or dislocation.     IMPRESSION:     Soft tissue swelling of the RIGHT great toe with gas present as well as a bony resorption of the distal tuft and interphalangeal joint consistent with osteomyelitis and septic arthritis.    VASCULAR STUDIES: N/A    LAST  WOUND CULTURE:  DATE : 4/17/2020 right great toe positive  Anaerobic Culture   Order: [id number] - Reflex for Order [id number]   Status:  Final result   Visible to patient:  Yes (MyChart) Next appt:  06/03/2020 at 11:00 AM in Infectious Diseases (Ksenia Childs, A.P.R.N.)   Specimen Information:  Tissue          Component  1mo ago   Significant Indicator  POSPositive  (POS)      Source  TISS     Site  Right great toe proximal phalan     Culture Result  Abnormal     Growth noted after further incubation, see below for   organism identification.     Culture Result  Abnormal     Bacteroides fragilis Group   Rare growth     Culture Result  Abnormal     Finegoldia magna   Light growth     Resulting Agency  [facility]                      ASSESSMENT AND PLAN:   1. Non-healing surgical wound, subsequent encounter  Comments: Dehiscence of first ray amputation site    6/25/2020: Wound area has significantly improved in quality with beefy red/pink granulation tissue forming..  Patient unable to tolerate offloading boot, using wheelchair for mobility.  -Excisional debridement of wound in clinic today, medically necessary to promote wound healing.  -Patient to return to clinic weekly for assessment and debridement  -Patient to change dressing 1-2 times per week in between clinic visits  -Patient instructed that he needs to continue to offload his foot is much as possible  -Rx for orthotic shoes and inserts provided in clinic on  6/11/2020.  Follow-up with patient regarding acquiring orthotic shoes and inserts.    Wound care: Skin protectant, Hydrofera Blue, Hypafix tape    2.  Type 2 diabetes mellitus with hyperglycemia, without long-term current use of insulin  Comments: Patient first diagnosed with type 2 diabetes while hospitalized in April 2020 for foot wound.  A1c 8.6    6/25/2020: Patient reports his blood sugar today was 140's.  States he did receive diabetes education while in the hospital, but very briefly.  -Diabetes education ordered review with patient next clinic visit if he has received a call regarding diabetic education.        3. Pain associated with wound    6/25/2020: Patient's sensation is intact in both feet  -2% viscous lidocaine applied topically to wound bed for approximately 5 minutes prior to debridement  -Patient tolerated procedure today with no complaints of discomfort.      4. Wound infection   Comments: Patient is being followed by infectious disease APRN Ksenia Childs she has placed the patient on Augmentin, end date of 6/4/2020 6/25/2020: No signs or symptoms of infection at this clinic visit  -Continue to monitor for for signs and symptoms of infection at each additional clinic visit        PATIENT EDUCATION  - Importance of adequate nutrition for wound healing  -Advised to go to ER for any increased redness, swelling, drainage, or odor, or if patient develops fever, chills, nausea or vomiting.         Please note that this note may have been created using voice recognition software. I have worked with technical experts from [employer] to optimize the interface.  I have made every reasonable attempt to correct obvious errors, but there may be errors of grammar and possibly content that I did not discover before finalizing the note.    N

## 2020-06-25 NOTE — PATIENT INSTRUCTIONS
Should you experience any significant changes in your wound(s) such as infection (redness, swelling, localized heat, increased pain, fever >101 F, chills) or have any questions regarding your home care instructions, please contact the wound center [phone]. If after hours, contact your primary care physician or go the hospital emergency room.  Keep dressing clean and dry and cover while bathing. Only change dressing if over saturated, soiled or its falling off.

## 2020-07-02 ENCOUNTER — OFFICE VISIT (OUTPATIENT)
Dept: WOUND CARE | Facility: MEDICAL CENTER | Age: 45
End: 2020-07-02
Attending: NURSE PRACTITIONER
Payer: MEDICAID

## 2020-07-02 VITALS
OXYGEN SATURATION: 99 % | TEMPERATURE: 98.6 F | DIASTOLIC BLOOD PRESSURE: 82 MMHG | HEART RATE: 98 BPM | RESPIRATION RATE: 20 BRPM | SYSTOLIC BLOOD PRESSURE: 133 MMHG

## 2020-07-02 DIAGNOSIS — T14.8XXA PAIN ASSOCIATED WITH WOUND: ICD-10-CM

## 2020-07-02 DIAGNOSIS — T14.8XXA WOUND INFECTION: ICD-10-CM

## 2020-07-02 DIAGNOSIS — L08.9 WOUND INFECTION: ICD-10-CM

## 2020-07-02 DIAGNOSIS — E11.65 TYPE 2 DIABETES MELLITUS WITH HYPERGLYCEMIA, WITHOUT LONG-TERM CURRENT USE OF INSULIN (HCC): ICD-10-CM

## 2020-07-02 DIAGNOSIS — T81.89XD NONHEALING SURGICAL WOUND, SUBSEQUENT ENCOUNTER: ICD-10-CM

## 2020-07-02 DIAGNOSIS — R52 PAIN ASSOCIATED WITH WOUND: ICD-10-CM

## 2020-07-02 PROCEDURE — 11042 DBRDMT SUBQ TIS 1ST 20SQCM/<: CPT

## 2020-07-02 PROCEDURE — 11042 DBRDMT SUBQ TIS 1ST 20SQCM/<: CPT | Performed by: NURSE PRACTITIONER

## 2020-07-02 ASSESSMENT — ENCOUNTER SYMPTOMS
NAUSEA: 0
VOMITING: 0
PALPITATIONS: 0
ROS SKIN COMMENTS: WOUND TO RIGHT FOOT
DIZZINESS: 0
CHILLS: 0
CONSTIPATION: 0
WEAKNESS: 0
FEVER: 0
DIARRHEA: 0
HEADACHES: 0

## 2020-07-02 NOTE — PROGRESS NOTES
Provider Encounter- Full Thickness wound    HISTORY OF PRESENT ILLNESS  Wound History:    START OF CARE IN CLINIC: 5/28/2020    REFERRING PROVIDER: Jaenne Cheatham     WOUND- Full Thickness Wound   LOCATION: Right first ray amputation site   HISTORY: Patient with a history of diabetes and ulceration to right great toe this evolved into osteomyelitis patient reports he noticed this on approximately 4/9/2020.  He was admitted to [facility] from 4/15/2022 4/21/2020 x-ray at that time confirmed osteomyelitis he underwent a first ray amputation and right KENTON by Dr. Robledo on 4/17/2020.  Or culture positive for strep B and staph epi.  He was discharged home with follow-up with Dr. Robledo sutures were removed to KENTON and right first ray amputation site.  Patient was referred to [facility] by Jeanne Cheatham after follow-up at LPS rounds.  Was determined the patient that time needed to continue wound care from [facility] for open wound to right first ray amputation site.  During that [facility] around visit Dr. Gilliam performed excisional debridement patient is referred to [facility] for continuation of care.    Pertinent Medical History: Osteomyelitis of right foot, diabetes type 2, normocytic anemia    DIABETES HX: Diagnosed with type 2 diabetes in April 2020 when he was hospitalized for foot ulcer.  He is currently managing with metformin.  Checks blood sugars newly and reports that these typically run around 100-120.  Was seen by diabetes educator in the hospital.  Does it have numbness in feet.  Usually wears regular, nonprescriptive shoes. Does not check his feet routinely.  Has had previous foot ulcers and has undergone a right first ray amputation.    TOBACCO USE: Patient reports that he smokes approximately 6 cigarettes a day    Patient's problem list, allergies, and current medications reviewed and updated in Epic    Interval History:  5/28/2020: Clinic visit with JAIMIE Baez. Patient states that they  are feeling well today.  Patient denies fever, chills, nausea, vomiting, lightheadedness, dizziness, shortness of breath and chest pain.  Patient came into clinic today with significant amount of callus to periwound area.  This is removed with scissors and forceps.  Wound area debrided with curette C dressing application of below.    6/4/2020: Clinic visit with JAIMIE Baez. Patient states that they are feeling well today.  Patient denies fever, chills, nausea, vomiting, lightheadedness, dizziness, shortness of breath and chest pain.  Patient's wound is beginning to heal with granulation tissue and epithelialization to the superior aspect of the wound.  Patient does still have some fibrous connective tissue to the inferior aspect of the wound we will use honey alginate to try to atmolytically debride this nonviable tissue.    6/11/2020 : Clinic visit with JAIMIE Wiggins.  Patient states he is feeling well, denies fevers, chills, nausea, vomiting, shortness of breath or cough.  He reports that his blood sugar this morning was 107.  He has been unable to wear offloading boot, states it causes him a lot of pain.  He is wearing regular shoes, and using a wheelchair for mobility.    6/18/2020: Clinic visit with JAIMIE Baez. Patient states that they are feeling well today.  Patient denies fever, chills, nausea, vomiting, lightheadedness, dizziness, shortness of breath and chest pain.  Removed fibrous/connective tissue from wound bed which was nonviable there is some granulation tissue formation to the wound bed.  Due to successful debridement we will hold honey alginate.    6/25/2020: Clinic visit with JAIMIE Baez. Patient states that they are feeling well today.  Patient denies fever, chills, nausea, vomiting, lightheadedness, dizziness, shortness of breath and chest pain.  Wound is progressing has increased granulation tissue there is no residual fibrous tissue to wound bed any  longer.    7/2/2020 : Clinic visit with JAIMIE Wiggins.  [place] states he is feeling well today,denies fevers, chills, nausea, vomiting, cough or shortness of breath.  He reports that his blood sugar was 105, and they have been consistently in the low 100s.  He has started the process for his orthotic shoes and inserts, should be ready in 3 to 4 weeks.      REVIEW OF SYSTEMS:   Review of Systems   Constitutional: Negative for chills and fever.   Cardiovascular: Negative for chest pain, palpitations and leg swelling.   Gastrointestinal: Negative for constipation, diarrhea, nausea and vomiting.   Skin: Negative for itching and rash.        Wound to right foot   Neurological: Negative for dizziness, weakness and headaches.       PHYSICAL EXAMINATION:   /82   Pulse 98   Temp 37 °C (98.6 °F) (Temporal)   Resp 20   SpO2 99%     Physical Exam   Constitutional: He is oriented to person, place, and time and well-developed, well-nourished, and in no distress.   HENT:   Head: Normocephalic and atraumatic.   Eyes: Pupils are equal, round, and reactive to light.   Cardiovascular: Intact distal pulses.   Pulmonary/Chest: Effort normal. No respiratory distress. He has no wheezes.   Musculoskeletal:      Comments: Right first ray amputation   Neurological: He is alert and oriented to person, place, and time.   Skin: Skin is warm and dry. No erythema.   Full-thickness wound to right first ray amputation site.  See doc flowsheets   Psychiatric: Mood and affect normal.       WOUND ASSESSMENT        Wound 05/28/20 Diabetic Ulcer Foot Distal Right R hallux amputation site/ 1st MTH (Active)   Wound Image    07/02/20 1050   Site Assessment Pink;Yellow 07/02/20 1050   Periwound Assessment Intact;Scar tissue;Edema 07/02/20 1050   Margins Attached edges 07/02/20 1050   Closure Secondary intention 07/02/20 1050   Drainage Amount Moderate 07/02/20 1050   Drainage Description Serosanguineous 07/02/20 1050   Treatments  Cleansed;Topical Lidocaine;Provider debridement 07/02/20 1050   Wound Cleansing Normal Saline Irrigation 07/02/20 1050   Periwound Protectant Skin Protectant Wipes to Periwound;Barrier Paste 07/02/20 1050   Dressing Cleansing/Solutions Normal Saline 07/02/20 1050   Dressing Options Hydrofera Blue Ready;Hypafix Tape 07/02/20 1050   Dressing Changed Changed 07/02/20 1050   Dressing Status Clean;Dry;Intact 07/02/20 1050   Dressing Change/Treatment Frequency Every 72 hrs, and As Needed 07/02/20 1050   Non-staged Wound Description Full thickness 07/02/20 1050   Wound Length (cm) 0.3 cm 07/02/20 1050   Wound Width (cm) 0.2 cm 07/02/20 1050   Wound Depth (cm) 0.2 cm 07/02/20 1050   Wound Surface Area (cm^2) 0.06 cm^2 07/02/20 1050   Wound Volume (cm^3) 0.01 cm^3 07/02/20 1050   Post-Procedure Length (cm) 0.3 cm 07/02/20 1050   Post-Procedure Width (cm) 0.3 cm 07/02/20 1050   Post-Procedure Depth (cm) 0.2 cm 07/02/20 1050   Post-Procedure Surface Area (cm^2) 0.09 cm^2 07/02/20 1050   Post-Procedure Volume (cm^3) 0.02 cm^3 07/02/20 1050   Wound Healing % 99 07/02/20 1050   Wound Bed Granulation (%) 40 % 06/11/20 1110   Wound Bed Epithelium (%) 0 % 06/11/20 1110   Wound Bed Slough (%) 60 % 06/11/20 1110   Wound Bed Eschar (%) 0 % 06/11/20 1110   Tunneling (cm) 0 cm 07/02/20 1050   Undermining (cm) 0 cm 07/02/20 1050   Wound Odor None 07/02/20 1050   Pulses Right;2+;DP;PT 07/02/20 1050   Exposed Structures None 07/02/20 1050               PROCEDURE: Excisional debridement of right hallux amputation site ulcer  -2% viscous lidocaine applied topically to wound bed for approximately 5 minutes prior to debridement  -Curette used to debride wound bed.  Excisional debridement was performed to remove devitalized tissue until healthy, bleeding tissue was visualized.   Entire surface of wound,  0.09 cm2 debrided.  Tissue debrided into the subcutaneous layer.    -Bleeding controlled with manual pressure.    -Wound care completed by  wound RN, refer to flowsheet  -Patient tolerated the procedure well, without c/o pain or discomfort.       Pertinent Labs and Diagnostics:    Labs:     A1c:   Lab Results   Component Value Date/Time    HBA1C 8.6 (H) 04/15/2020 11:10 PM          IMAGING: DX-foot-complete 3+ dated 4/15/2020  Gas present within the soft tissues of the plantar aspect of the great toe and at the ball the foot.  Apparent bony destruction of the distal phalanx.  Potential erosion within the 1st interphalangeal joint.  Soft tissue swelling of great toe noted.  No fracture or dislocation.     IMPRESSION:     Soft tissue swelling of the RIGHT great toe with gas present as well as a bony resorption of the distal tuft and interphalangeal joint consistent with osteomyelitis and septic arthritis.    VASCULAR STUDIES: N/A    LAST  WOUND CULTURE:  DATE : 4/17/2020 right great toe positive  Anaerobic Culture   Order: [id number] - Reflex for Order [id number]   Status:  Final result   Visible to patient:  Yes (MyChart) Next appt:  06/03/2020 at 11:00 AM in Infectious Diseases (Ksenia Childs, A.P.R.N.)   Specimen Information:  Tissue          Component  1mo ago   Significant Indicator  POSPositive  (POS)      Source  TISS     Site  Right great toe proximal phalan     Culture Result  Abnormal     Growth noted after further incubation, see below for   organism identification.     Culture Result  Abnormal     Bacteroides fragilis Group   Rare growth     Culture Result  Abnormal     Finegoldia magna   Light growth     Resulting Agency  [facility]                      ASSESSMENT AND PLAN:   1. Non-healing surgical wound, subsequent encounter  Comments: Dehiscence of first ray amputation site    7/2/2020: Wound area has significantly decreased, nearly resolved..  Patient unable to tolerate offloading boot, using wheelchair for mobility.  -Excisional debridement of wound in clinic today, medically necessary to promote wound healing.  -Patient to return to clinic  weekly for assessment and debridement  -Patient to change dressing 1-2 times per week in between clinic visits  -Patient instructed that he needs to continue to offload his foot is much as possible  -Patient has begun process for orthotic shoes and inserts, should be ready in 3 to 4 weeks  -Anticipate full resolution of this wound within 7 to 10 days.    Wound care: Skin protectant, Hydrofera Blue, Hypafix tape    2.  Type 2 diabetes mellitus with hyperglycemia, without long-term current use of insulin  Comments: Patient first diagnosed with type 2 diabetes while hospitalized in April 2020 for foot wound.  A1c 8.6    7/2/2020: Patient reports his blood sugar today was 105.    -Diabetes education ordered review with patient next clinic visit if he has received a call regarding diabetic education.        3. Pain associated with wound    7/2/2020: Patient's sensation is intact in both feet  -2% viscous lidocaine applied topically to wound bed for approximately 5 minutes prior to debridement  -Patient tolerated procedure today with no complaints of discomfort.      4. Wound infection   Comments: Patient is being followed by infectious disease APRN Ksenia Childs she has placed the patient on Augmentin, end date of 6/4/2020 7/2/2020: No signs or symptoms of infection at this clinic visit  -Continue to monitor for for signs and symptoms of infection at each additional clinic visit        PATIENT EDUCATION  - Importance of adequate nutrition for wound healing  -Advised to go to ER for any increased redness, swelling, drainage, or odor, or if patient develops fever, chills, nausea or vomiting.         Please note that this note may have been created using voice recognition software. I have worked with technical experts from [employer] to optimize the interface.  I have made every reasonable attempt to correct obvious errors, but there may be errors of grammar and possibly content that I did not discover before  finalizing the note.    N

## 2020-07-02 NOTE — PATIENT INSTRUCTIONS
-Keep dressings clean, dry and covered while bathing. Change dressings if they become over saturated, soiled or fall off; or once in between clinic visits.     -Avoid prolonged standing or sitting without elevating your legs.    -Remove your compression garments if you have severe pain, severe swelling, numbness, color change, or temperature change in your toes. If you need to remove your compression garments, do so by unrolling them. Do not cut the compression garments off, this is to prevent cutting yourself on accident.    -Never walk around the house barefoot. Always wear a rubber soled slipper when walking around the house.    -Should you experience any significant changes in your wound(s), such as infection (redness, swelling, localized heat, increased pain, fever > 101 F, chills) or have any questions regarding your home care instructions, please contact the wound center at [phone]. If after hours, contact your primary care physician or go to the hospital emergency room.

## 2020-07-09 ENCOUNTER — OFFICE VISIT (OUTPATIENT)
Dept: WOUND CARE | Facility: MEDICAL CENTER | Age: 45
End: 2020-07-09
Attending: NURSE PRACTITIONER
Payer: MEDICAID

## 2020-07-09 VITALS
TEMPERATURE: 98.5 F | SYSTOLIC BLOOD PRESSURE: 129 MMHG | RESPIRATION RATE: 12 BRPM | DIASTOLIC BLOOD PRESSURE: 86 MMHG | OXYGEN SATURATION: 99 % | HEART RATE: 93 BPM

## 2020-07-09 DIAGNOSIS — T14.8XXA PAIN ASSOCIATED WITH WOUND: ICD-10-CM

## 2020-07-09 DIAGNOSIS — R52 PAIN ASSOCIATED WITH WOUND: ICD-10-CM

## 2020-07-09 DIAGNOSIS — L08.9 WOUND INFECTION: ICD-10-CM

## 2020-07-09 DIAGNOSIS — E11.65 TYPE 2 DIABETES MELLITUS WITH HYPERGLYCEMIA, WITHOUT LONG-TERM CURRENT USE OF INSULIN (HCC): ICD-10-CM

## 2020-07-09 DIAGNOSIS — T14.8XXA WOUND INFECTION: ICD-10-CM

## 2020-07-09 DIAGNOSIS — T81.89XD NONHEALING SURGICAL WOUND, SUBSEQUENT ENCOUNTER: Primary | ICD-10-CM

## 2020-07-09 PROCEDURE — 11042 DBRDMT SUBQ TIS 1ST 20SQCM/<: CPT | Performed by: NURSE PRACTITIONER

## 2020-07-09 PROCEDURE — 11042 DBRDMT SUBQ TIS 1ST 20SQCM/<: CPT

## 2020-07-09 ASSESSMENT — ENCOUNTER SYMPTOMS
WEAKNESS: 0
CONSTIPATION: 0
CHILLS: 0
FEVER: 0
HEADACHES: 0
DIZZINESS: 0
ROS SKIN COMMENTS: WOUND TO RIGHT FOOT
NAUSEA: 0
DIARRHEA: 0
VOMITING: 0
PALPITATIONS: 0

## 2020-07-09 ASSESSMENT — PAIN SCALES - GENERAL: PAINLEVEL: NO PAIN

## 2020-07-09 NOTE — PROGRESS NOTES
Provider Encounter- Full Thickness wound    HISTORY OF PRESENT ILLNESS  Wound History:    START OF CARE IN CLINIC: 5/28/2020    REFERRING PROVIDER: Jeanne Cheatham     WOUND- Full Thickness Wound   LOCATION: Right first ray amputation site   HISTORY: Patient with a history of diabetes and ulceration to right great toe this evolved into osteomyelitis patient reports he noticed this on approximately 4/9/2020.  He was admitted to [facility] from 4/15/2022 4/21/2020 x-ray at that time confirmed osteomyelitis he underwent a first ray amputation and right [place] by Dr. Robledo on 4/17/2020.  Or culture positive for strep B and staph epi.  He was discharged home with follow-up with Dr. Robledo sutures were removed to KENTON and right first ray amputation site.  Patient was referred to [facility] by Jeanne Cheatham after follow-up at LPS rounds.  Was determined the patient that time needed to continue wound care from [facility] for open wound to right first ray amputation site.  During that [facility] around visit Dr. Gilliam performed excisional debridement patient is referred to [facility] for continuation of care.    Pertinent Medical History: Osteomyelitis of right foot, diabetes type 2, normocytic anemia    DIABETES HX: Diagnosed with type 2 diabetes in April 2020 when he was hospitalized for foot ulcer.  He is currently managing with metformin.  Checks blood sugars newly and reports that these typically run around 100-120.  Was seen by diabetes educator in the hospital.  Does it have numbness in feet.  Usually wears regular, nonprescriptive shoes. Does not check his feet routinely.  Has had previous foot ulcers and has undergone a right first ray amputation.    TOBACCO USE: Patient reports that he smokes approximately 6 cigarettes a day    Patient's problem list, allergies, and current medications reviewed and updated in Epic    Interval History:  5/28/2020: Clinic visit with JAIMIE Baez. Patient states that they  are feeling well today.  Patient denies fever, chills, nausea, vomiting, lightheadedness, dizziness, shortness of breath and chest pain.  Patient came into clinic today with significant amount of callus to periwound area.  This is removed with scissors and forceps.  Wound area debrided with curette C dressing application of below.    6/4/2020: Clinic visit with JAIMIE Baez. Patient states that they are feeling well today.  Patient denies fever, chills, nausea, vomiting, lightheadedness, dizziness, shortness of breath and chest pain.  Patient's wound is beginning to heal with granulation tissue and epithelialization to the superior aspect of the wound.  Patient does still have some fibrous connective tissue to the inferior aspect of the wound we will use honey alginate to try to atmolytically debride this nonviable tissue.    6/11/2020 : Clinic visit with JAIMIE Wiggins.  Patient states he is feeling well, denies fevers, chills, nausea, vomiting, shortness of breath or cough.  He reports that his blood sugar this morning was 107.  He has been unable to wear offloading boot, states it causes him a lot of pain.  He is wearing regular shoes, and using a wheelchair for mobility.    6/18/2020: Clinic visit with JAIMIE Baez. Patient states that they are feeling well today.  Patient denies fever, chills, nausea, vomiting, lightheadedness, dizziness, shortness of breath and chest pain.  Removed fibrous/connective tissue from wound bed which was nonviable there is some granulation tissue formation to the wound bed.  Due to successful debridement we will hold honey alginate.    6/25/2020: Clinic visit with JAIMIE Baez. Patient states that they are feeling well today.  Patient denies fever, chills, nausea, vomiting, lightheadedness, dizziness, shortness of breath and chest pain.  Wound is progressing has increased granulation tissue there is no residual fibrous tissue to wound bed any  longer.    7/2/2020 : Clinic visit with JAIMIE Wiggins.  [place] states he is feeling well today,denies fevers, chills, nausea, vomiting, cough or shortness of breath.  He reports that his blood sugar was 105, and they have been consistently in the low 100s.  He has started the process for his orthotic shoes and inserts, should be ready in 3 to 4 weeks.    7/9/2020: Clinic visit with JAIMIE Baez. Patient states that they are feeling well today.  Patient denies fever, chills, nausea, vomiting, lightheadedness, dizziness, shortness of breath and chest pain.  Patient's wound continues to progress in clinic continue with current plan of care.      REVIEW OF SYSTEMS:   Review of Systems   Constitutional: Negative for chills and fever.   Cardiovascular: Negative for chest pain, palpitations and leg swelling.   Gastrointestinal: Negative for constipation, diarrhea, nausea and vomiting.   Skin: Negative for itching and rash.        Wound to right foot   Neurological: Negative for dizziness, weakness and headaches.       PHYSICAL EXAMINATION:   /86   Pulse 93   Temp 36.9 °C (98.5 °F)   Resp 12   SpO2 99%     Physical Exam   Constitutional: He is oriented to person, place, and time and well-developed, well-nourished, and in no distress.   HENT:   Head: Normocephalic and atraumatic.   Eyes: Pupils are equal, round, and reactive to light.   Cardiovascular: Intact distal pulses.   Pulmonary/Chest: Effort normal. No respiratory distress. He has no wheezes.   Musculoskeletal:      Comments: Right first ray amputation   Neurological: He is alert and oriented to person, place, and time.   Skin: Skin is warm and dry. No erythema.   Full-thickness wound to right first ray amputation site.  See doc flowsheets   Psychiatric: Mood and affect normal.       WOUND ASSESSMENT         Wound 04/16/20 Foot;Toe, Hallux Right (Active)       Wound 04/17/20 Incision Leg Right (Active)       Wound 05/28/20 Diabetic Ulcer Foot  Distal Right R hallux amputation site/ 1st MTH (Active)   Wound Image    07/09/20 1103   Site Assessment Pink;Yellow 07/09/20 1103   Periwound Assessment Intact;Scar tissue;Edema 07/09/20 1103   Margins Attached edges 07/09/20 1103   Closure Secondary intention 07/09/20 1103   Drainage Amount Small 07/09/20 1103   Drainage Description Serosanguineous 07/09/20 1103   Treatments Cleansed;Topical Lidocaine;Provider debridement 07/09/20 1103   Wound Cleansing Normal Saline Irrigation 07/09/20 1103   Periwound Protectant Skin Protectant Wipes to Periwound;Barrier Paste 07/09/20 1103   Dressing Cleansing/Solutions Normal Saline 07/09/20 1103   Dressing Options Hydrofera Blue Ready;Hypafix Tape 07/09/20 1103   Dressing Changed Changed 07/02/20 1050   Dressing Status Clean;Dry;Intact 07/09/20 1103   Dressing Change/Treatment Frequency Every 72 hrs, and As Needed 07/09/20 1103   Non-staged Wound Description Full thickness 07/09/20 1103   Wound Length (cm) 0.4 cm 07/09/20 1103   Wound Width (cm) 0.2 cm 07/09/20 1103   Wound Depth (cm) 0.1 cm 07/09/20 1103   Wound Surface Area (cm^2) 0.08 cm^2 07/09/20 1103   Wound Volume (cm^3) 0.01 cm^3 07/09/20 1103   Post-Procedure Length (cm) 0.8 cm 07/09/20 1103   Post-Procedure Width (cm) 0.5 cm 07/09/20 1103   Post-Procedure Depth (cm) 0.2 cm 07/09/20 1103   Post-Procedure Surface Area (cm^2) 0.4 cm^2 07/09/20 1103   Post-Procedure Volume (cm^3) 0.08 cm^3 07/09/20 1103   Wound Healing % 99 07/09/20 1103   Wound Bed Granulation (%) 40 % 06/11/20 1110   Wound Bed Epithelium (%) 0 % 06/11/20 1110   Wound Bed Slough (%) 60 % 06/11/20 1110   Wound Bed Eschar (%) 0 % 06/11/20 1110   Tunneling (cm) 0 cm 07/09/20 1103   Undermining (cm) 0 cm 07/09/20 1103   Wound Odor None 07/09/20 1103   Pulses Right;2+;DP;PT 07/02/20 1050   Exposed Structures None 07/09/20 1103                    PROCEDURE: Excisional debridement of right hallux amputation site ulcer  -2% viscous lidocaine applied topically  to wound bed for approximately 5 minutes prior to debridement  -Curette used to debride wound bed.  Excisional debridement was performed to remove devitalized tissue until healthy, bleeding tissue was visualized.   Entire surface of wound, 0.04  cm2 debrided.  Tissue debrided into the subcutaneous layer.    -Bleeding controlled with manual pressure.    -Wound care completed by wound RN, refer to flowsheet  -Patient tolerated the procedure well, without c/o pain or discomfort.       Pertinent Labs and Diagnostics:    Labs:     A1c:   Lab Results   Component Value Date/Time    HBA1C 8.6 (H) 04/15/2020 11:10 PM          IMAGING: DX-foot-complete 3+ dated 4/15/2020  Gas present within the soft tissues of the plantar aspect of the great toe and at the ball the foot.  Apparent bony destruction of the distal phalanx.  Potential erosion within the 1st interphalangeal joint.  Soft tissue swelling of great toe noted.  No fracture or dislocation.     IMPRESSION:     Soft tissue swelling of the RIGHT great toe with gas present as well as a bony resorption of the distal tuft and interphalangeal joint consistent with osteomyelitis and septic arthritis.    VASCULAR STUDIES: N/A    LAST  WOUND CULTURE:  DATE : 4/17/2020 right great toe positive  Anaerobic Culture   Order: [id number] - Reflex for Order [id number]   Status:  Final result   Visible to patient:  Yes (MyChart) Next appt:  06/03/2020 at 11:00 AM in Infectious Diseases (Ksenia Childs, A.P.R.N.)   Specimen Information:  Tissue          Component  1mo ago   Significant Indicator  POSPositive  (POS)      Source  TISS     Site  Right great toe proximal phalan     Culture Result  Abnormal     Growth noted after further incubation, see below for   organism identification.     Culture Result  Abnormal     Bacteroides fragilis Group   Rare growth     Culture Result  Abnormal     Finegoldia magna   Light growth     Resulting Agency  [facility]                      ASSESSMENT AND PLAN:    1. Non-healing surgical wound, subsequent encounter  Comments: Dehiscence of first ray amputation site    7/9/2020: Wound continues to decrease in size. Patient's wound has beefy red granulation tissue.   -Excisional debridement of wound in clinic today, medically necessary to promote wound healing.  -Patient to return to clinic weekly for assessment and debridement  -Patient to change dressing 1-2 times per week in between clinic visits  -Patient instructed that he needs to continue to offload his foot as much as possible  -Patient has begun process for orthotic shoes and inserts, should be ready next few weeks  -Anticipate full resolution of this wound within next 2 weeks.    Wound care: Skin protectant, Hydrofera Blue, Hypafix tape    2.  Type 2 diabetes mellitus with hyperglycemia, without long-term current use of insulin  Comments: Patient first diagnosed with type 2 diabetes while hospitalized in April 2020 for foot wound.  A1c 8.6    7/9/2020: Did not check patient's blood sugar in clinic today  -Diabetes education ordered review with patient next clinic visit if he has received a call regarding diabetic education.        3. Pain associated with wound    7/9/2020: Patient's sensation is intact in both feet  -2% viscous lidocaine applied topically to wound bed for approximately 5 minutes prior to debridement  -Patient tolerated procedure today with no complaints of discomfort.      4. Wound infection   Comments: Patient is being followed by infectious disease APRN Ksenia Childs she has placed the patient on Augmentin, end date of 6/4/2020 7/9/2020: No signs or symptoms of infection at this clinic visit  -Continue to monitor for for signs and symptoms of infection at each additional clinic visit        PATIENT EDUCATION  - Importance of adequate nutrition for wound healing  -Advised to go to ER for any increased redness, swelling, drainage, or odor, or if patient develops fever, chills, nausea or  vomiting.         Please note that this note may have been created using voice recognition software. I have worked with technical experts from [facility] to optimize the interface.  I have made every reasonable attempt to correct obvious errors, but there may be errors of grammar and possibly content that I did not discover before finalizing the note.    N

## 2020-07-09 NOTE — PATIENT INSTRUCTIONS
-Keep your wound dressing clean, dry, and intact.    -Change your dressing every 2 to 3 days or if it becomes soiled, soaked, or falls off.    -Should you experience any significant changes in your wound(s), such as infection (redness, swelling, localized heat, increased pain, fever > 101 F, chills) or have any questions regarding your home care instructions, please contact the wound center at [phone]. If after hours, contact your primary care physician or go to the hospital emergency room.

## 2020-07-16 ENCOUNTER — NON-PROVIDER VISIT (OUTPATIENT)
Dept: WOUND CARE | Facility: MEDICAL CENTER | Age: 45
End: 2020-07-16
Attending: NURSE PRACTITIONER
Payer: MEDICAID

## 2020-07-30 ENCOUNTER — TELEPHONE (OUTPATIENT)
Dept: WOUND CARE | Facility: MEDICAL CENTER | Age: 45
End: 2020-07-30

## 2020-07-30 NOTE — TELEPHONE ENCOUNTER
Patient no show for appointment this morning. This is patients 2nd no show in a row, following a late cancel the week prior, making for 3 missed appointments in a row. Telephone call to patient's listed mobile number. Call sent to voicemail, no patient identifiers. No voicemail left.    Patient discharged for no shows with permission from nursing supervisor.

## 2020-08-06 ENCOUNTER — APPOINTMENT (OUTPATIENT)
Dept: WOUND CARE | Facility: MEDICAL CENTER | Age: 45
End: 2020-08-06
Attending: NURSE PRACTITIONER
Payer: MEDICAID

## 2020-08-13 ENCOUNTER — APPOINTMENT (OUTPATIENT)
Dept: WOUND CARE | Facility: MEDICAL CENTER | Age: 45
End: 2020-08-13
Attending: NURSE PRACTITIONER
Payer: MEDICAID

## 2020-08-28 ENCOUNTER — APPOINTMENT (OUTPATIENT)
Dept: RADIOLOGY | Facility: MEDICAL CENTER | Age: 45
End: 2020-08-28
Attending: EMERGENCY MEDICINE
Payer: MEDICAID

## 2020-08-28 ENCOUNTER — HOSPITAL ENCOUNTER (EMERGENCY)
Facility: MEDICAL CENTER | Age: 45
End: 2020-08-28
Attending: EMERGENCY MEDICINE
Payer: MEDICAID

## 2020-08-28 ENCOUNTER — APPOINTMENT (OUTPATIENT)
Dept: RADIOLOGY | Facility: MEDICAL CENTER | Age: 45
End: 2020-08-28
Payer: MEDICAID

## 2020-08-28 VITALS
BODY MASS INDEX: 27.13 KG/M2 | SYSTOLIC BLOOD PRESSURE: 96 MMHG | TEMPERATURE: 99.3 F | HEART RATE: 87 BPM | HEIGHT: 68 IN | RESPIRATION RATE: 18 BRPM | WEIGHT: 179.01 LBS | OXYGEN SATURATION: 99 % | DIASTOLIC BLOOD PRESSURE: 52 MMHG

## 2020-08-28 DIAGNOSIS — Z91.199 NONCOMPLIANCE WITH DIABETES TREATMENT: ICD-10-CM

## 2020-08-28 DIAGNOSIS — F15.10 AMPHETAMINE ABUSE (HCC): ICD-10-CM

## 2020-08-28 DIAGNOSIS — R40.0 SOMNOLENCE: ICD-10-CM

## 2020-08-28 DIAGNOSIS — R07.9 CHEST PAIN, UNSPECIFIED TYPE: Primary | ICD-10-CM

## 2020-08-28 LAB
ALBUMIN SERPL BCP-MCNC: 4.1 G/DL (ref 3.2–4.9)
ALBUMIN/GLOB SERPL: 1.5 G/DL
ALP SERPL-CCNC: 208 U/L (ref 30–99)
ALT SERPL-CCNC: 31 U/L (ref 2–50)
AMPHET UR QL SCN: POSITIVE
ANION GAP SERPL CALC-SCNC: 13 MMOL/L (ref 7–16)
AST SERPL-CCNC: 20 U/L (ref 12–45)
B-OH-BUTYR SERPL-MCNC: <0.2 MMOL/L (ref 0.02–0.27)
BARBITURATES UR QL SCN: NEGATIVE
BASOPHILS # BLD AUTO: 0.3 % (ref 0–1.8)
BASOPHILS # BLD: 0.03 K/UL (ref 0–0.12)
BENZODIAZ UR QL SCN: NEGATIVE
BILIRUB SERPL-MCNC: 0.3 MG/DL (ref 0.1–1.5)
BUN SERPL-MCNC: 14 MG/DL (ref 8–22)
BZE UR QL SCN: NEGATIVE
CALCIUM SERPL-MCNC: 9.1 MG/DL (ref 8.5–10.5)
CANNABINOIDS UR QL SCN: POSITIVE
CHLORIDE SERPL-SCNC: 100 MMOL/L (ref 96–112)
CO2 SERPL-SCNC: 21 MMOL/L (ref 20–33)
CREAT SERPL-MCNC: 0.88 MG/DL (ref 0.5–1.4)
EKG IMPRESSION: NORMAL
EOSINOPHIL # BLD AUTO: 0.09 K/UL (ref 0–0.51)
EOSINOPHIL NFR BLD: 0.8 % (ref 0–6.9)
ERYTHROCYTE [DISTWIDTH] IN BLOOD BY AUTOMATED COUNT: 40.6 FL (ref 35.9–50)
ETHANOL BLD-MCNC: <10.1 MG/DL (ref 0–10.1)
GLOBULIN SER CALC-MCNC: 2.7 G/DL (ref 1.9–3.5)
GLUCOSE BLD-MCNC: 201 MG/DL (ref 65–99)
GLUCOSE SERPL-MCNC: 205 MG/DL (ref 65–99)
HCT VFR BLD AUTO: 41.7 % (ref 42–52)
HGB BLD-MCNC: 13.6 G/DL (ref 14–18)
IMM GRANULOCYTES # BLD AUTO: 0.04 K/UL (ref 0–0.11)
IMM GRANULOCYTES NFR BLD AUTO: 0.4 % (ref 0–0.9)
LYMPHOCYTES # BLD AUTO: 1.27 K/UL (ref 1–4.8)
LYMPHOCYTES NFR BLD: 11.3 % (ref 22–41)
MCH RBC QN AUTO: 28.1 PG (ref 27–33)
MCHC RBC AUTO-ENTMCNC: 32.6 G/DL (ref 33.7–35.3)
MCV RBC AUTO: 86.2 FL (ref 81.4–97.8)
METHADONE UR QL SCN: NEGATIVE
MONOCYTES # BLD AUTO: 1.04 K/UL (ref 0–0.85)
MONOCYTES NFR BLD AUTO: 9.3 % (ref 0–13.4)
NEUTROPHILS # BLD AUTO: 8.76 K/UL (ref 1.82–7.42)
NEUTROPHILS NFR BLD: 77.9 % (ref 44–72)
NRBC # BLD AUTO: 0 K/UL
NRBC BLD-RTO: 0 /100 WBC
OPIATES UR QL SCN: NEGATIVE
OXYCODONE UR QL SCN: NEGATIVE
PCP UR QL SCN: NEGATIVE
PLATELET # BLD AUTO: 256 K/UL (ref 164–446)
PMV BLD AUTO: 9.8 FL (ref 9–12.9)
POTASSIUM SERPL-SCNC: 4.2 MMOL/L (ref 3.6–5.5)
PROPOXYPH UR QL SCN: NEGATIVE
PROT SERPL-MCNC: 6.8 G/DL (ref 6–8.2)
RBC # BLD AUTO: 4.84 M/UL (ref 4.7–6.1)
SODIUM SERPL-SCNC: 134 MMOL/L (ref 135–145)
TROPONIN T SERPL-MCNC: 8 NG/L (ref 6–19)
WBC # BLD AUTO: 11.2 K/UL (ref 4.8–10.8)

## 2020-08-28 PROCEDURE — 93005 ELECTROCARDIOGRAM TRACING: CPT

## 2020-08-28 PROCEDURE — 96374 THER/PROPH/DIAG INJ IV PUSH: CPT

## 2020-08-28 PROCEDURE — 80053 COMPREHEN METABOLIC PANEL: CPT

## 2020-08-28 PROCEDURE — 99285 EMERGENCY DEPT VISIT HI MDM: CPT

## 2020-08-28 PROCEDURE — 84484 ASSAY OF TROPONIN QUANT: CPT

## 2020-08-28 PROCEDURE — 700105 HCHG RX REV CODE 258: Performed by: EMERGENCY MEDICINE

## 2020-08-28 PROCEDURE — A9270 NON-COVERED ITEM OR SERVICE: HCPCS | Performed by: EMERGENCY MEDICINE

## 2020-08-28 PROCEDURE — 71045 X-RAY EXAM CHEST 1 VIEW: CPT

## 2020-08-28 PROCEDURE — 700111 HCHG RX REV CODE 636 W/ 250 OVERRIDE (IP): Performed by: EMERGENCY MEDICINE

## 2020-08-28 PROCEDURE — 80307 DRUG TEST PRSMV CHEM ANLYZR: CPT

## 2020-08-28 PROCEDURE — 85025 COMPLETE CBC W/AUTO DIFF WBC: CPT

## 2020-08-28 PROCEDURE — 700102 HCHG RX REV CODE 250 W/ 637 OVERRIDE(OP): Performed by: EMERGENCY MEDICINE

## 2020-08-28 PROCEDURE — 82010 KETONE BODYS QUAN: CPT

## 2020-08-28 PROCEDURE — 93005 ELECTROCARDIOGRAM TRACING: CPT | Performed by: EMERGENCY MEDICINE

## 2020-08-28 PROCEDURE — 82962 GLUCOSE BLOOD TEST: CPT

## 2020-08-28 PROCEDURE — 36415 COLL VENOUS BLD VENIPUNCTURE: CPT

## 2020-08-28 RX ORDER — ASPIRIN 81 MG/1
324 TABLET, CHEWABLE ORAL ONCE
Status: COMPLETED | OUTPATIENT
Start: 2020-08-28 | End: 2020-08-28

## 2020-08-28 RX ORDER — SODIUM CHLORIDE 9 MG/ML
1000 INJECTION, SOLUTION INTRAVENOUS ONCE
Status: COMPLETED | OUTPATIENT
Start: 2020-08-28 | End: 2020-08-28

## 2020-08-28 RX ORDER — KETOROLAC TROMETHAMINE 30 MG/ML
30 INJECTION, SOLUTION INTRAMUSCULAR; INTRAVENOUS ONCE
Status: COMPLETED | OUTPATIENT
Start: 2020-08-28 | End: 2020-08-28

## 2020-08-28 RX ADMIN — ASPIRIN 324 MG: 81 TABLET, CHEWABLE ORAL at 17:11

## 2020-08-28 RX ADMIN — SODIUM CHLORIDE 1000 ML: 9 INJECTION, SOLUTION INTRAVENOUS at 17:11

## 2020-08-28 RX ADMIN — KETOROLAC TROMETHAMINE 30 MG: 30 INJECTION, SOLUTION INTRAMUSCULAR at 17:11

## 2020-08-28 ASSESSMENT — FIBROSIS 4 INDEX: FIB4 SCORE: 0.32
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
## 2020-08-28 NOTE — ED TRIAGE NOTES
Amb to triage w/ c/o chest pain x 3 days.  Feeling tired & numbness to hands x 2 wks.  Pt is a diabetic, hasn't taken his meds or checked his fsbs in 3 wks.  301 at triage.     Pt masked at check in. Pt denies exposure to anyone known to be Covid +.  Pt denies any respiratory symptoms/fevers, loss of smell or taste.

## 2020-11-19 ENCOUNTER — NON-PROVIDER VISIT (OUTPATIENT)
Dept: WOUND CARE | Facility: MEDICAL CENTER | Age: 45
End: 2020-11-19
Attending: ORTHOPAEDIC SURGERY
Payer: MEDICAID

## 2020-11-19 PROCEDURE — 99211 OFF/OP EST MAY X REQ PHY/QHP: CPT

## 2020-11-20 ENCOUNTER — OFFICE VISIT (OUTPATIENT)
Dept: WOUND CARE | Facility: MEDICAL CENTER | Age: 45
End: 2020-11-20
Attending: ORTHOPAEDIC SURGERY
Payer: MEDICAID

## 2020-11-20 ENCOUNTER — HOSPITAL ENCOUNTER (OUTPATIENT)
Dept: RADIOLOGY | Facility: MEDICAL CENTER | Age: 45
End: 2020-11-20
Attending: ORTHOPAEDIC SURGERY
Payer: MEDICAID

## 2020-11-20 DIAGNOSIS — Z89.411 HISTORY OF PARTIAL RAY AMPUTATION OF FIRST TOE OF RIGHT FOOT (HCC): ICD-10-CM

## 2020-11-20 DIAGNOSIS — M86.171 ACUTE OSTEOMYELITIS OF RIGHT ANKLE OR FOOT (HCC): ICD-10-CM

## 2020-11-20 DIAGNOSIS — L97.516 DIABETIC ULCER OF TOE OF RIGHT FOOT ASSOCIATED WITH TYPE 2 DIABETES MELLITUS, WITH BONE INVOLVEMENT WITHOUT EVIDENCE OF NECROSIS (HCC): ICD-10-CM

## 2020-11-20 DIAGNOSIS — E11.621 DIABETIC ULCER OF TOE OF RIGHT FOOT ASSOCIATED WITH TYPE 2 DIABETES MELLITUS, WITH BONE INVOLVEMENT WITHOUT EVIDENCE OF NECROSIS (HCC): ICD-10-CM

## 2020-11-20 PROCEDURE — 99213 OFFICE O/P EST LOW 20 MIN: CPT | Performed by: NURSE PRACTITIONER

## 2020-11-20 PROCEDURE — 99214 OFFICE O/P EST MOD 30 MIN: CPT

## 2020-11-20 PROCEDURE — 73630 X-RAY EXAM OF FOOT: CPT | Mod: RT
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
## 2020-11-20 NOTE — PATIENT INSTRUCTIONS
Should you experience any significant changes in your wound(s) such as infection (redness, swelling, localized heat, increased pain, fever >101 F, chills) or have any questions regarding your home care instructions, please contact the wound center [phone]. If after hours, contact your primary care physician or go the hospital emergency room.  Keep dressing clean and dry and cover while bathing. Only change dressing if over saturated, soiled or its falling off.       Report to ortho rounds at 0800 tomorrow. NPO from midnight tonight.

## 2020-11-20 NOTE — PROGRESS NOTES
Pt seen during ortho rounds with LPS team for right distal 2nd toe wound. Following physician assessment, RN evaluated patient's wound and dressed with Betadine covered with dry gauze and secured with hypafix tape.    Patient to obtain X-Ray of toes and Covid testing done in anticipation of partial toe amputation.
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
## 2020-11-24 ENCOUNTER — ANESTHESIA (OUTPATIENT)
Dept: SURGERY | Facility: MEDICAL CENTER | Age: 45
End: 2020-11-24
Payer: MEDICAID

## 2020-11-24 ENCOUNTER — HOSPITAL ENCOUNTER (OUTPATIENT)
Facility: MEDICAL CENTER | Age: 45
End: 2020-11-24
Attending: ORTHOPAEDIC SURGERY | Admitting: ORTHOPAEDIC SURGERY
Payer: MEDICAID

## 2020-11-24 ENCOUNTER — ANESTHESIA EVENT (OUTPATIENT)
Dept: SURGERY | Facility: MEDICAL CENTER | Age: 45
End: 2020-11-24
Payer: MEDICAID

## 2020-11-24 VITALS
RESPIRATION RATE: 16 BRPM | BODY MASS INDEX: 25.43 KG/M2 | OXYGEN SATURATION: 98 % | SYSTOLIC BLOOD PRESSURE: 121 MMHG | HEART RATE: 77 BPM | HEIGHT: 68 IN | TEMPERATURE: 96.9 F | DIASTOLIC BLOOD PRESSURE: 84 MMHG | WEIGHT: 167.77 LBS

## 2020-11-24 PROBLEM — F15.10 METHAMPHETAMINE ABUSE (HCC): Status: ACTIVE | Noted: 2020-11-24

## 2020-11-24 LAB
ANION GAP SERPL CALC-SCNC: 8 MMOL/L (ref 7–16)
BUN SERPL-MCNC: 21 MG/DL (ref 8–22)
CALCIUM SERPL-MCNC: 9.8 MG/DL (ref 8.5–10.5)
CHLORIDE SERPL-SCNC: 101 MMOL/L (ref 96–112)
CO2 SERPL-SCNC: 24 MMOL/L (ref 20–33)
COVID ORDER STATUS COVID19: NORMAL
CREAT SERPL-MCNC: 0.87 MG/DL (ref 0.5–1.4)
GLUCOSE BLD-MCNC: 247 MG/DL (ref 65–99)
GLUCOSE SERPL-MCNC: 259 MG/DL (ref 65–99)
GRAM STN SPEC: NORMAL
POTASSIUM SERPL-SCNC: 4.7 MMOL/L (ref 3.6–5.5)
SARS-COV+SARS-COV-2 AG RESP QL IA.RAPID: NOTDETECTED
SIGNIFICANT IND 70042: NORMAL
SITE SITE: NORMAL
SODIUM SERPL-SCNC: 133 MMOL/L (ref 135–145)
SOURCE SOURCE: NORMAL
SPECIMEN SOURCE: NORMAL

## 2020-11-24 PROCEDURE — 700101 HCHG RX REV CODE 250: Performed by: ORTHOPAEDIC SURGERY

## 2020-11-24 PROCEDURE — 87015 SPECIMEN INFECT AGNT CONCNTJ: CPT

## 2020-11-24 PROCEDURE — 160025 RECOVERY II MINUTES (STATS): Performed by: ORTHOPAEDIC SURGERY

## 2020-11-24 PROCEDURE — 87075 CULTR BACTERIA EXCEPT BLOOD: CPT

## 2020-11-24 PROCEDURE — 80048 BASIC METABOLIC PNL TOTAL CA: CPT

## 2020-11-24 PROCEDURE — 700105 HCHG RX REV CODE 258: Performed by: ORTHOPAEDIC SURGERY

## 2020-11-24 PROCEDURE — 160009 HCHG ANES TIME/MIN: Performed by: ORTHOPAEDIC SURGERY

## 2020-11-24 PROCEDURE — 160036 HCHG PACU - EA ADDL 30 MINS PHASE I: Performed by: ORTHOPAEDIC SURGERY

## 2020-11-24 PROCEDURE — 87186 SC STD MICRODIL/AGAR DIL: CPT

## 2020-11-24 PROCEDURE — 700101 HCHG RX REV CODE 250: Performed by: ANESTHESIOLOGY

## 2020-11-24 PROCEDURE — 700102 HCHG RX REV CODE 250 W/ 637 OVERRIDE(OP): Performed by: ANESTHESIOLOGY

## 2020-11-24 PROCEDURE — 87077 CULTURE AEROBIC IDENTIFY: CPT

## 2020-11-24 PROCEDURE — 160035 HCHG PACU - 1ST 60 MINS PHASE I: Performed by: ORTHOPAEDIC SURGERY

## 2020-11-24 PROCEDURE — 160046 HCHG PACU - 1ST 60 MINS PHASE II: Performed by: ORTHOPAEDIC SURGERY

## 2020-11-24 PROCEDURE — 700111 HCHG RX REV CODE 636 W/ 250 OVERRIDE (IP): Performed by: ANESTHESIOLOGY

## 2020-11-24 PROCEDURE — C9803 HOPD COVID-19 SPEC COLLECT: HCPCS | Performed by: ORTHOPAEDIC SURGERY

## 2020-11-24 PROCEDURE — 87426 SARSCOV CORONAVIRUS AG IA: CPT

## 2020-11-24 PROCEDURE — 160048 HCHG OR STATISTICAL LEVEL 1-5: Performed by: ORTHOPAEDIC SURGERY

## 2020-11-24 PROCEDURE — 501838 HCHG SUTURE GENERAL: Performed by: ORTHOPAEDIC SURGERY

## 2020-11-24 PROCEDURE — A9270 NON-COVERED ITEM OR SERVICE: HCPCS | Performed by: ANESTHESIOLOGY

## 2020-11-24 PROCEDURE — 160027 HCHG SURGERY MINUTES - 1ST 30 MINS LEVEL 2: Performed by: ORTHOPAEDIC SURGERY

## 2020-11-24 PROCEDURE — 700102 HCHG RX REV CODE 250 W/ 637 OVERRIDE(OP): Performed by: ORTHOPAEDIC SURGERY

## 2020-11-24 PROCEDURE — 160002 HCHG RECOVERY MINUTES (STAT): Performed by: ORTHOPAEDIC SURGERY

## 2020-11-24 PROCEDURE — 500881 HCHG PACK, EXTREMITY: Performed by: ORTHOPAEDIC SURGERY

## 2020-11-24 PROCEDURE — 87205 SMEAR GRAM STAIN: CPT

## 2020-11-24 PROCEDURE — 160038 HCHG SURGERY MINUTES - EA ADDL 1 MIN LEVEL 2: Performed by: ORTHOPAEDIC SURGERY

## 2020-11-24 PROCEDURE — A6454 SELF-ADHER BAND W>=3" <5"/YD: HCPCS | Performed by: ORTHOPAEDIC SURGERY

## 2020-11-24 PROCEDURE — 87070 CULTURE OTHR SPECIMN AEROBIC: CPT

## 2020-11-24 PROCEDURE — A6222 GAUZE <=16 IN NO W/SAL W/O B: HCPCS | Performed by: ORTHOPAEDIC SURGERY

## 2020-11-24 PROCEDURE — 82962 GLUCOSE BLOOD TEST: CPT

## 2020-11-24 PROCEDURE — A9270 NON-COVERED ITEM OR SERVICE: HCPCS | Performed by: ORTHOPAEDIC SURGERY

## 2020-11-24 RX ORDER — OXYCODONE HCL 5 MG/5 ML
10 SOLUTION, ORAL ORAL
Status: COMPLETED | OUTPATIENT
Start: 2020-11-24 | End: 2020-11-24

## 2020-11-24 RX ORDER — BUPIVACAINE HYDROCHLORIDE 5 MG/ML
INJECTION, SOLUTION EPIDURAL; INTRACAUDAL
Status: DISCONTINUED | OUTPATIENT
Start: 2020-11-24 | End: 2020-11-24 | Stop reason: HOSPADM

## 2020-11-24 RX ORDER — CEFAZOLIN SODIUM 1 G/3ML
INJECTION, POWDER, FOR SOLUTION INTRAMUSCULAR; INTRAVENOUS PRN
Status: DISCONTINUED | OUTPATIENT
Start: 2020-11-24 | End: 2020-11-24 | Stop reason: SURG

## 2020-11-24 RX ORDER — DEXAMETHASONE SODIUM PHOSPHATE 4 MG/ML
INJECTION, SOLUTION INTRA-ARTICULAR; INTRALESIONAL; INTRAMUSCULAR; INTRAVENOUS; SOFT TISSUE PRN
Status: DISCONTINUED | OUTPATIENT
Start: 2020-11-24 | End: 2020-11-24 | Stop reason: SURG

## 2020-11-24 RX ORDER — OXYCODONE HCL 5 MG/5 ML
5 SOLUTION, ORAL ORAL
Status: COMPLETED | OUTPATIENT
Start: 2020-11-24 | End: 2020-11-24

## 2020-11-24 RX ORDER — SODIUM CHLORIDE, SODIUM LACTATE, POTASSIUM CHLORIDE, CALCIUM CHLORIDE 600; 310; 30; 20 MG/100ML; MG/100ML; MG/100ML; MG/100ML
INJECTION, SOLUTION INTRAVENOUS CONTINUOUS
Status: DISCONTINUED | OUTPATIENT
Start: 2020-11-24 | End: 2020-11-24 | Stop reason: HOSPADM

## 2020-11-24 RX ORDER — DIPHENHYDRAMINE HYDROCHLORIDE 50 MG/ML
12.5 INJECTION INTRAMUSCULAR; INTRAVENOUS
Status: DISCONTINUED | OUTPATIENT
Start: 2020-11-24 | End: 2020-11-24 | Stop reason: HOSPADM

## 2020-11-24 RX ORDER — HYDROMORPHONE HYDROCHLORIDE 1 MG/ML
0.2 INJECTION, SOLUTION INTRAMUSCULAR; INTRAVENOUS; SUBCUTANEOUS
Status: DISCONTINUED | OUTPATIENT
Start: 2020-11-24 | End: 2020-11-24 | Stop reason: HOSPADM

## 2020-11-24 RX ORDER — MEPERIDINE HYDROCHLORIDE 25 MG/ML
12.5 INJECTION INTRAMUSCULAR; INTRAVENOUS; SUBCUTANEOUS
Status: DISCONTINUED | OUTPATIENT
Start: 2020-11-24 | End: 2020-11-24 | Stop reason: HOSPADM

## 2020-11-24 RX ORDER — HYDROMORPHONE HYDROCHLORIDE 1 MG/ML
0.1 INJECTION, SOLUTION INTRAMUSCULAR; INTRAVENOUS; SUBCUTANEOUS
Status: DISCONTINUED | OUTPATIENT
Start: 2020-11-24 | End: 2020-11-24 | Stop reason: HOSPADM

## 2020-11-24 RX ORDER — MIDAZOLAM HYDROCHLORIDE 1 MG/ML
INJECTION INTRAMUSCULAR; INTRAVENOUS PRN
Status: DISCONTINUED | OUTPATIENT
Start: 2020-11-24 | End: 2020-11-24 | Stop reason: SURG

## 2020-11-24 RX ORDER — HALOPERIDOL 5 MG/ML
1 INJECTION INTRAMUSCULAR
Status: DISCONTINUED | OUTPATIENT
Start: 2020-11-24 | End: 2020-11-24 | Stop reason: HOSPADM

## 2020-11-24 RX ORDER — ONDANSETRON 2 MG/ML
INJECTION INTRAMUSCULAR; INTRAVENOUS PRN
Status: DISCONTINUED | OUTPATIENT
Start: 2020-11-24 | End: 2020-11-24 | Stop reason: SURG

## 2020-11-24 RX ORDER — LABETALOL HYDROCHLORIDE 5 MG/ML
5 INJECTION, SOLUTION INTRAVENOUS
Status: DISCONTINUED | OUTPATIENT
Start: 2020-11-24 | End: 2020-11-24 | Stop reason: HOSPADM

## 2020-11-24 RX ORDER — HYDROMORPHONE HYDROCHLORIDE 1 MG/ML
0.4 INJECTION, SOLUTION INTRAMUSCULAR; INTRAVENOUS; SUBCUTANEOUS
Status: DISCONTINUED | OUTPATIENT
Start: 2020-11-24 | End: 2020-11-24 | Stop reason: HOSPADM

## 2020-11-24 RX ORDER — ONDANSETRON 2 MG/ML
4 INJECTION INTRAMUSCULAR; INTRAVENOUS
Status: DISCONTINUED | OUTPATIENT
Start: 2020-11-24 | End: 2020-11-24 | Stop reason: HOSPADM

## 2020-11-24 RX ORDER — LIDOCAINE HYDROCHLORIDE 20 MG/ML
INJECTION, SOLUTION EPIDURAL; INFILTRATION; INTRACAUDAL; PERINEURAL PRN
Status: DISCONTINUED | OUTPATIENT
Start: 2020-11-24 | End: 2020-11-24 | Stop reason: SURG

## 2020-11-24 RX ADMIN — POVIDONE IODINE 15 ML: 100 SOLUTION TOPICAL at 11:22

## 2020-11-24 RX ADMIN — FENTANYL CITRATE 50 MCG: 50 INJECTION, SOLUTION INTRAMUSCULAR; INTRAVENOUS at 11:54

## 2020-11-24 RX ADMIN — DEXAMETHASONE SODIUM PHOSPHATE 4 MG: 4 INJECTION, SOLUTION INTRA-ARTICULAR; INTRALESIONAL; INTRAMUSCULAR; INTRAVENOUS; SOFT TISSUE at 11:57

## 2020-11-24 RX ADMIN — MIDAZOLAM HYDROCHLORIDE 2 MG: 1 INJECTION, SOLUTION INTRAMUSCULAR; INTRAVENOUS at 11:49

## 2020-11-24 RX ADMIN — OXYCODONE HYDROCHLORIDE 5 MG: 5 SOLUTION ORAL at 13:16

## 2020-11-24 RX ADMIN — PROPOFOL 140 MG: 10 INJECTION, EMULSION INTRAVENOUS at 11:54

## 2020-11-24 RX ADMIN — LIDOCAINE HYDROCHLORIDE 40 MG: 20 INJECTION, SOLUTION EPIDURAL; INFILTRATION; INTRACAUDAL at 11:54

## 2020-11-24 RX ADMIN — CEFAZOLIN 2 G: 330 INJECTION, POWDER, FOR SOLUTION INTRAMUSCULAR; INTRAVENOUS at 11:50

## 2020-11-24 RX ADMIN — FENTANYL CITRATE 50 MCG: 50 INJECTION, SOLUTION INTRAMUSCULAR; INTRAVENOUS at 12:00

## 2020-11-24 RX ADMIN — ONDANSETRON 4 MG: 2 INJECTION INTRAMUSCULAR; INTRAVENOUS at 12:10

## 2020-11-24 RX ADMIN — SODIUM CHLORIDE, POTASSIUM CHLORIDE, SODIUM LACTATE AND CALCIUM CHLORIDE: 600; 310; 30; 20 INJECTION, SOLUTION INTRAVENOUS at 11:22

## 2020-11-24 ASSESSMENT — PAIN DESCRIPTION - PAIN TYPE
TYPE: SURGICAL PAIN

## 2020-11-24 ASSESSMENT — FIBROSIS 4 INDEX: FIB4 SCORE: 0.6

## 2020-11-24 ASSESSMENT — PAIN SCALES - GENERAL: PAIN_LEVEL: 0

## 2020-11-24 NOTE — ANESTHESIA PROCEDURE NOTES
Airway    Date/Time: 11/24/2020 11:55 AM  Performed by: Dean Girard M.D.  Authorized by: Dean Girard M.D.     Location:  OR  Urgency:  Elective  Difficult Airway: No    Indications for Airway Management:  Anesthesia      Spontaneous Ventilation: absent    Sedation Level:  Deep  Preoxygenated: Yes    Mask Difficulty Assessment:  0 - not attempted  Final Airway Type:  Supraglottic airway  Final Supraglottic Airway:  Standard LMA    SGA Size:  5  Number of Attempts at Approach:  1

## 2020-11-24 NOTE — OP REPORT
DATE OF SERVICE:  11/24/2020    PREOPERATIVE DIAGNOSES:    1.  Diabetes mellitus type 2.  2.  Status post first ray amputation.  3.  Second, third, fourth and fifth hammertoes.  4.  Necrotic second toe.    POSTOPERATIVE DIAGNOSES:  1.  Diabetes mellitus type 2.  2.  Status post first ray amputation.  3.  Second, third, fourth and fifth hammertoes.  4.  Necrotic second toe.    PROCEDURES:  1.  Right second toe amputation.  2.  Right second, third, fourth and fifth flexor tenotomy.    SURGEON:  Tim Gilliam MD    ANESTHESIA:  General with 30 mL local 0.5% Marcaine without epinephrine.    ESTIMATED BLOOD LOSS:  10 mL    TOURNIQUET TIME:  8 minutes at 250 mmHg.    FINDINGS:  Necrotic second toe, hammering of the other lesser toes.    COMPLICATIONS:  None.    OUTCOME:  PACU in stable condition.    HISTORY OF PRESENT ILLNESS:  A 45-year-old diabetic gentleman poorly   controlled, has a history of the first ray amputation.  He went on to heal.    He has now developed an ulceration full thickness of the second toe and has   hammering of each of his other lesser toes with calluses at the distal tip.    He was indicated for the above-stated procedure, greeted in the preoperative   holding area and identified by name and medical record number.  Right lower   extremity was marked.  Risks of procedure including bleeding, infection, pain,   wound breakdown, need for more surgery were discussed.  He provided written   consent.    DESCRIPTION OF PROCEDURE:  He was taken to the operating room, placed on table   in supine position.  Preoperative antibiotics were administered.  General   anesthesia was induced.  Nonsterile tourniquet placed on his right thigh.    Right lower extremity prepped and draped in the usual sterile fashion.  An   operative pause was undertaken, where all present were in agreement with   patient identification, laterality, and procedure to be performed.  The limb   was elevated for 5 minutes, tourniquet  raised to 250 mmHg.  We made a small   percutaneous incision on plantar aspect of the second, third, fourth and fifth   toes, which did relax each toe nicely.  The reason, we did the second to see   what level we needed to perform the amputation and how relax it could be at   the level.  We then ellipsed out the distal aspect of the second toe and   disarticulated at the distal interphalangeal joint.  We then used a rongeur to   remove the distal aspect of the proximal phalanx.  This was sent for a clean   cut culture.  We let the tourniquet down, and we copiously irrigated his   wound.  We trimmed the skin for a tension free closure.  We closed it with 3-0   nylon in interrupted horizontal mattress fashion.  We then oversewed this   with a running 3-0 stitch.  Each plantar incision was closed with 3-0 nylon in   simple fashion.  Adaptic gauze and compressive wrap were placed.  He was   awakened and extubated in stable condition.    POSTOPERATIVE COURSE:  He will be discharged home today assuming adequate pain   control and mobilization, weightbearing as tolerated in a postoperative shoe.    I will see him in 10-14 days for suture removal and wound check.       ____________________________________     MD MESHA LOCKWOOD / ANGELINA    DD:  11/24/2020 12:28:10  DT:  11/24/2020 14:06:45    D#:  [id number]  Job#:  [id number]

## 2020-11-24 NOTE — ANESTHESIA TIME REPORT
Anesthesia Start and Stop Event Times     Date Time Event    11/24/2020 1141 Ready for Procedure     1149 Anesthesia Start     1232 Anesthesia Stop        Responsible Staff  11/24/20    Name Role Begin End    Dean Girard M.D. Anesth 1149 1232        Preop Diagnosis (Free Text):  Pre-op Diagnosis     Right second toe necrosis, 2-5 hammertoes        Preop Diagnosis (Codes):    Post op Diagnosis  Right second toe ulcer, with necrosis of bone (HCC)  2-5 Hammertoes    Premium Reason  Non-Premium    Comments:

## 2020-11-24 NOTE — DISCHARGE INSTRUCTIONS
ACTIVITY: Rest and take it easy for the first 24 hours.  A responsible adult is recommended to remain with you during that time.  It is normal to feel sleepy.  We encourage you to not do anything that requires balance, judgment or coordination.    MILD FLU-LIKE SYMPTOMS ARE NORMAL. YOU MAY EXPERIENCE GENERALIZED MUSCLE ACHES, THROAT IRRITATION, HEADACHE AND/OR SOME NAUSEA.    FOR 24 HOURS DO NOT:  Drive, operate machinery or run household appliances.  Drink beer or alcoholic beverages.   Make important decisions or sign legal documents.    SPECIAL INSTRUCTIONS:   You may bear weight as tolerated on you right leg while in postop shoe  Keep dressing clean and dry  Stay ahead of pain  Ice and elevate    DIET: To avoid nausea, slowly advance diet as tolerated, avoiding spicy or greasy foods for the first day.  Add more substantial food to your diet according to your physician's instructions.  Babies can be fed formula or breast milk as soon as they are hungry.  INCREASE FLUIDS AND FIBER TO AVOID CONSTIPATION.    SURGICAL DRESSING/BATHING: Keep dressing clean and dry    FOLLOW-UP APPOINTMENT:  A follow-up appointment should be arranged with your doctor in 1-2 Weeks; call to schedule.    You should CALL YOUR PHYSICIAN if you develop:  Fever greater than 101 degrees F.  Pain not relieved by medication, or persistent nausea or vomiting.  Excessive bleeding (blood soaking through dressing) or unexpected drainage from the wound.  Extreme redness or swelling around the incision site, drainage of pus or foul smelling drainage.  Inability to urinate or empty your bladder within 8 hours.  Problems with breathing or chest pain.    You should call 911 if you develop problems with breathing or chest pain.  If you are unable to contact your doctor or surgical center, you should go to the nearest emergency room or urgent care center.      Physician's telephone #: [phone] Dr. Gilliam    If any questions arise, call your doctor.   If your doctor is not available, please feel free to call the Surgical Center at [phone]. The Contact Center is open Monday through Friday 7AM to 5PM and may speak to a nurse at [phone], or toll free at [phone].     A registered nurse may call you a few days after your surgery to see how you are doing after your procedure.    MEDICATIONS: Resume taking daily medication.  Take prescribed pain medication with food.  If no medication is prescribed, you may take non-aspirin pain medication if needed.  PAIN MEDICATION CAN BE VERY CONSTIPATING.  Take a stool softener or laxative such as senokot, pericolace, or milk of magnesia if needed.    Prescription given for Oxycodone.  Last pain medication given at 1:15 PM.    If your physician has prescribed pain medication that includes Acetaminophen (Tylenol), do not take additional Acetaminophen (Tylenol) while taking the prescribed medication.    Depression / Suicide Risk    As you are discharged from this [facility] facility, it is important to learn how to keep safe from harming yourself.    Recognize the warning signs:  · Abrupt changes in personality, positive or negative- including increase in energy   · Giving away possessions  · Change in eating patterns- significant weight changes-  positive or negative  · Change in sleeping patterns- unable to sleep or sleeping all the time   · Unwillingness or inability to communicate  · Depression  · Unusual sadness, discouragement and loneliness  · Talk of wanting to die  · Neglect of personal appearance   · Rebelliousness- reckless behavior  · Withdrawal from people/activities they love  · Confusion- inability to concentrate     If you or a loved one observes any of these behaviors or has concerns about self-harm, here's what you can do:  · Talk about it- your feelings and reasons for harming yourself  · Remove any means that you might use to hurt yourself (examples: pills, rope, extension cords,  firearm)  · Get professional help from the community (Mental Health, Substance Abuse, psychological counseling)  · Do not be alone:Call your Safe Contact- someone whom you trust who will be there for you.  · Call your local CRISIS HOTLINE 608-8149 or 223-765-6710  · Call your local Children's Mobile Crisis Response Team [place] (407) 880-5065 or www.GreenCloud  · Call the toll free National Suicide Prevention Hotlines   · National Suicide Prevention Lifeline 400-512-JASP (0837)  · National Hope Line Network 800-SUICIDE (709-3431)

## 2020-11-24 NOTE — OR NURSING
Patient recovered well in post-op. AAOx4. VSS, on RA. Surgical sites MALOU, surgical dressing CDI. Surgical pain managed through PO medication and ice pack. Patient able to intake fluids without nausea. Significant other, Kirstie, updated and discussed POC. No patient belongings in recovery. Postop boot delivered by orthopedic assistant and placed on gurney. Patient transported to phase II.

## 2020-11-24 NOTE — PROGRESS NOTES
Pt arrived to PACU II at 1405. Pt aa/ox4, VSS. Discharge criteria met. Pain is 0/10 to rle. Rt foot dressing is clean, dry, and intact, post-op shoe in place. Pt changed into clothing with assistance. Discharge instructions given; pt and family verbalized understanding and questions answered.  IV removed, tip intact. Will follow-up with Dr. Gilliam in 1-2 weeks. Prescriptions with SO. Pt discharged home and escorted via w/c with RN in stable condition.

## 2020-11-24 NOTE — OR SURGEON
Immediate Post OP Note    PreOp Diagnosis: Right second toe ulcer with exposed bone, hammer toes    PostOp Diagnosis: Same    Procedure(s):  AMPUTATION, TOE - 2ND - Wound Class: Contaminated  TENOTOMY - 3-5 PERCUTANEOUS - Wound Class: Clean    Surgeon(s):  Tim Gilliam M.D.    Anesthesiologist/Type of Anesthesia:  Anesthesiologist: Dean Girard M.D./General    Surgical Staff:  Circulator: Niesha Dubois R.N.  Scrub Person: Flora Diane    Specimens removed if any:  ID Type Source Tests Collected by Time Destination   1 : Right second toe Bone Toe AEROBIC/ANAEROBIC CULTURE (SURGERY) Tim Gilliam M.D. 11/24/2020 12:13 PM        Estimated Blood Loss: 10cc    TT: 8 min @ 250mmHg    Findings: Necrotic second toe, hammertoes    Complications: None        11/24/2020 12:21 PM Tim Gilliam M.D.

## 2020-11-24 NOTE — H&P
Surgery Orthopedic History & Physical Note    Date  11/24/2020    Primary Care Physician  Pcp Pt States None    CC  Right second toe necrosis, 2-5 hammertoes    HPI  This is a 45 y.o. male who presented with a nonhealing wound right second toe, is s/p great toe amp.  Hammered lesser toes.  Followed in wound care, indicated for second toe amp, 2-5 tenotomies.    Past Medical History:   Diagnosis Date   • Diabetes ([facility])    • Methamphetamine abuse (HCC) 05/08/2020    Reports last use about 1 year ago       Past Surgical History:   Procedure Laterality Date   • TOE AMPUTATION Right 4/17/2020    Procedure: AMPUTATION, TOE - GREAT TOE;  Surgeon: Breezy Robledo M.D.;  Location: SURGERY [facility];  Service: Orthopedics   • OTHER ORTHOPEDIC SURGERY      pin placed per pt       No current facility-administered medications for this encounter.        Social History     Socioeconomic History   • Marital status: Other     Spouse name: Not on file   • Number of children: Not on file   • Years of education: Not on file   • Highest education level: Not on file   Occupational History   • Not on file   Social Needs   • Financial resource strain: Very hard   • Food insecurity     Worry: Sometimes true     Inability: Sometimes true   • Transportation needs     Medical: Yes     Non-medical: Yes   Tobacco Use   • Smoking status: Current Every Day Smoker     Packs/day: 0.25     Types: Cigarettes   • Smokeless tobacco: Never Used   Substance and Sexual Activity   • Alcohol use: Not Currently   • Drug use: Not Currently     Comment: clean x1 ye   • Sexual activity: Not on file   Lifestyle   • Physical activity     Days per week: Not on file     Minutes per session: Not on file   • Stress: Not on file   Relationships   • Social connections     Talks on phone: Not on file     Gets together: Not on file     Attends Muslim service: Not on file     Active member of club or organization: Not on file     Attends meetings of clubs or  organizations: Not on file     Relationship status: Not on file   • Intimate partner violence     Fear of current or ex partner: Not on file     Emotionally abused: Not on file     Physically abused: Not on file     Forced sexual activity: Not on file   Other Topics Concern   • Not on file   Social History Narrative   • Not on file       No family history on file.    Allergies  Patient has no known allergies.    Review of Systems  Negative    Physical Exam    Vital Signs                       RLE: S/P first ray amp, necrotic second toe tip with hammering each of his lesser toes.  Palp DP pulse.  Dec sens stocking dist to above ankle.    Labs:                    Radiology:  No orders to display         Assessment/Plan:  Right second toe necrosis    NPO for right second toe amp, 2-5 perc tenotomies  WBAT in postop shoe postop  FU JOHN 1-2 weeks   DC home postop      Procedure(s):  AMPUTATION, TOE - 2ND  TENOTOMY - 3-5 PERCUTANEOUS

## 2020-11-24 NOTE — ANESTHESIA POSTPROCEDURE EVALUATION
Patient: Quoc Guillermo Jr.    Procedure Summary     Date: 11/24/20 Room / Location: [place] [id number] / SURGERY [facility]    Anesthesia Start: 1149 Anesthesia Stop: 1232    Procedures:       AMPUTATION, TOE - 2ND (Right Foot)      TENOTOMY - 3-5 PERCUTANEOUS (Right Foot) Diagnosis: (Right second toe necrosis, 2-5 hammertoes)    Surgeons: Tim Gilliam M.D. Responsible Provider: Dean Girard M.D.    Anesthesia Type: general ASA Status: 3          Final Anesthesia Type: general  Last vitals  BP   Blood Pressure: (P) 112/76    Temp   36.1 °C (97 °F)    Pulse   Pulse: 79   Resp   (!) 11    SpO2   100 %      Anesthesia Post Evaluation    Patient location during evaluation: PACU  Patient participation: complete - patient participated  Level of consciousness: awake and alert  Pain score: 0    Airway patency: patent  Anesthetic complications: no  Cardiovascular status: hemodynamically stable  Respiratory status: acceptable  Hydration status: euvolemic    PONV: none           Nurse Pain Score: 7 (NPRS)

## 2020-11-27 LAB
BACTERIA TISS AEROBE CULT: ABNORMAL
BACTERIA TISS AEROBE CULT: ABNORMAL
GRAM STN SPEC: ABNORMAL
SIGNIFICANT IND 70042: ABNORMAL
SITE SITE: ABNORMAL
SOURCE SOURCE: ABNORMAL

## 2020-11-29 LAB
BACTERIA SPEC ANAEROBE CULT: ABNORMAL
BACTERIA SPEC ANAEROBE CULT: ABNORMAL
SIGNIFICANT IND 70042: ABNORMAL
SITE SITE: ABNORMAL
SOURCE SOURCE: ABNORMAL

## 2022-01-21 ENCOUNTER — APPOINTMENT (OUTPATIENT)
Dept: RADIOLOGY | Facility: MEDICAL CENTER | Age: 47
DRG: 853 | End: 2022-01-21
Attending: PHYSICIAN ASSISTANT
Payer: MEDICAID

## 2022-01-21 ENCOUNTER — ANESTHESIA EVENT (OUTPATIENT)
Dept: SURGERY | Facility: MEDICAL CENTER | Age: 47
DRG: 853 | End: 2022-01-21
Payer: MEDICAID

## 2022-01-21 ENCOUNTER — HOSPITAL ENCOUNTER (INPATIENT)
Facility: MEDICAL CENTER | Age: 47
LOS: 3 days | DRG: 853 | End: 2022-01-24
Attending: EMERGENCY MEDICINE | Admitting: STUDENT IN AN ORGANIZED HEALTH CARE EDUCATION/TRAINING PROGRAM
Payer: MEDICAID

## 2022-01-21 ENCOUNTER — APPOINTMENT (OUTPATIENT)
Dept: RADIOLOGY | Facility: MEDICAL CENTER | Age: 47
DRG: 853 | End: 2022-01-21
Attending: EMERGENCY MEDICINE
Payer: MEDICAID

## 2022-01-21 ENCOUNTER — ANESTHESIA (OUTPATIENT)
Dept: SURGERY | Facility: MEDICAL CENTER | Age: 47
DRG: 853 | End: 2022-01-21
Payer: MEDICAID

## 2022-01-21 DIAGNOSIS — S61.452A ANIMAL BITE OF LEFT HAND WITH INFECTION, INITIAL ENCOUNTER: ICD-10-CM

## 2022-01-21 DIAGNOSIS — A40.8 SEPSIS DUE TO OTHER STREPTOCOCCUS SPECIES, UNSPECIFIED WHETHER ACUTE ORGAN DYSFUNCTION PRESENT (HCC): ICD-10-CM

## 2022-01-21 DIAGNOSIS — L08.9 ANIMAL BITE OF LEFT HAND WITH INFECTION, INITIAL ENCOUNTER: ICD-10-CM

## 2022-01-21 PROBLEM — A41.9 SEPSIS (HCC): Status: ACTIVE | Noted: 2022-01-21

## 2022-01-21 PROBLEM — L03.90 CELLULITIS: Status: ACTIVE | Noted: 2022-01-21

## 2022-01-21 PROBLEM — F17.200 NICOTINE DEPENDENCE: Status: ACTIVE | Noted: 2022-01-21

## 2022-01-21 PROBLEM — I10 HTN (HYPERTENSION): Status: ACTIVE | Noted: 2022-01-21

## 2022-01-21 LAB
ALBUMIN SERPL BCP-MCNC: 4.1 G/DL (ref 3.2–4.9)
ALBUMIN/GLOB SERPL: 0.9 G/DL
ALP SERPL-CCNC: 243 U/L (ref 30–99)
ALT SERPL-CCNC: 24 U/L (ref 2–50)
ANION GAP SERPL CALC-SCNC: 12 MMOL/L (ref 7–16)
APPEARANCE UR: CLEAR
AST SERPL-CCNC: 16 U/L (ref 12–45)
BASOPHILS # BLD AUTO: 0.3 % (ref 0–1.8)
BASOPHILS # BLD: 0.04 K/UL (ref 0–0.12)
BILIRUB SERPL-MCNC: 0.4 MG/DL (ref 0.1–1.5)
BILIRUB UR QL STRIP.AUTO: NEGATIVE
BUN SERPL-MCNC: 11 MG/DL (ref 8–22)
CALCIUM SERPL-MCNC: 9.6 MG/DL (ref 8.5–10.5)
CHLORIDE SERPL-SCNC: 96 MMOL/L (ref 96–112)
CO2 SERPL-SCNC: 22 MMOL/L (ref 20–33)
COLOR UR: YELLOW
CREAT SERPL-MCNC: 0.96 MG/DL (ref 0.5–1.4)
CRP SERPL HS-MCNC: 6.78 MG/DL (ref 0–0.75)
EOSINOPHIL # BLD AUTO: 0.1 K/UL (ref 0–0.51)
EOSINOPHIL NFR BLD: 0.7 % (ref 0–6.9)
ERYTHROCYTE [DISTWIDTH] IN BLOOD BY AUTOMATED COUNT: 38.4 FL (ref 35.9–50)
ERYTHROCYTE [SEDIMENTATION RATE] IN BLOOD BY WESTERGREN METHOD: 14 MM/HOUR (ref 0–20)
EST. AVERAGE GLUCOSE BLD GHB EST-MCNC: 280 MG/DL
FLUAV RNA SPEC QL NAA+PROBE: NEGATIVE
FLUBV RNA SPEC QL NAA+PROBE: NEGATIVE
GLOBULIN SER CALC-MCNC: 4.5 G/DL (ref 1.9–3.5)
GLUCOSE BLD-MCNC: 210 MG/DL (ref 65–99)
GLUCOSE SERPL-MCNC: 420 MG/DL (ref 65–99)
GLUCOSE UR STRIP.AUTO-MCNC: >=1000 MG/DL
HBA1C MFR BLD: 11.4 % (ref 4–5.6)
HCT VFR BLD AUTO: 44.4 % (ref 42–52)
HGB BLD-MCNC: 14.9 G/DL (ref 14–18)
IMM GRANULOCYTES # BLD AUTO: 0.06 K/UL (ref 0–0.11)
IMM GRANULOCYTES NFR BLD AUTO: 0.4 % (ref 0–0.9)
KETONES UR STRIP.AUTO-MCNC: ABNORMAL MG/DL
LACTATE BLD-SCNC: 1.9 MMOL/L (ref 0.5–2)
LEUKOCYTE ESTERASE UR QL STRIP.AUTO: NEGATIVE
LYMPHOCYTES # BLD AUTO: 1.72 K/UL (ref 1–4.8)
LYMPHOCYTES NFR BLD: 12.7 % (ref 22–41)
MCH RBC QN AUTO: 27.5 PG (ref 27–33)
MCHC RBC AUTO-ENTMCNC: 33.6 G/DL (ref 33.7–35.3)
MCV RBC AUTO: 81.9 FL (ref 81.4–97.8)
MICRO URNS: ABNORMAL
MONOCYTES # BLD AUTO: 1.03 K/UL (ref 0–0.85)
MONOCYTES NFR BLD AUTO: 7.6 % (ref 0–13.4)
NEUTROPHILS # BLD AUTO: 10.58 K/UL (ref 1.82–7.42)
NEUTROPHILS NFR BLD: 78.3 % (ref 44–72)
NITRITE UR QL STRIP.AUTO: NEGATIVE
NRBC # BLD AUTO: 0 K/UL
NRBC BLD-RTO: 0 /100 WBC
PH UR STRIP.AUTO: 7.5 [PH] (ref 5–8)
PLATELET # BLD AUTO: 401 K/UL (ref 164–446)
PMV BLD AUTO: 9.4 FL (ref 9–12.9)
POTASSIUM SERPL-SCNC: 4.2 MMOL/L (ref 3.6–5.5)
PROT SERPL-MCNC: 8.6 G/DL (ref 6–8.2)
PROT UR QL STRIP: NEGATIVE MG/DL
RBC # BLD AUTO: 5.42 M/UL (ref 4.7–6.1)
RBC UR QL AUTO: NEGATIVE
RSV RNA SPEC QL NAA+PROBE: NEGATIVE
SARS-COV-2 RNA RESP QL NAA+PROBE: NOTDETECTED
SCCMEC + MECA PNL NOSE NAA+PROBE: NEGATIVE
SODIUM SERPL-SCNC: 130 MMOL/L (ref 135–145)
SP GR UR STRIP.AUTO: 1.04
SPECIMEN SOURCE: NORMAL
UROBILINOGEN UR STRIP.AUTO-MCNC: 0.2 MG/DL
WBC # BLD AUTO: 13.5 K/UL (ref 4.8–10.8)

## 2022-01-21 PROCEDURE — 96365 THER/PROPH/DIAG IV INF INIT: CPT

## 2022-01-21 PROCEDURE — 160036 HCHG PACU - EA ADDL 30 MINS PHASE I: Performed by: ORTHOPAEDIC SURGERY

## 2022-01-21 PROCEDURE — C9803 HOPD COVID-19 SPEC COLLECT: HCPCS | Performed by: EMERGENCY MEDICINE

## 2022-01-21 PROCEDURE — 99406 BEHAV CHNG SMOKING 3-10 MIN: CPT

## 2022-01-21 PROCEDURE — 83036 HEMOGLOBIN GLYCOSYLATED A1C: CPT

## 2022-01-21 PROCEDURE — 80053 COMPREHEN METABOLIC PANEL: CPT

## 2022-01-21 PROCEDURE — 700101 HCHG RX REV CODE 250: Performed by: ANESTHESIOLOGY

## 2022-01-21 PROCEDURE — 81003 URINALYSIS AUTO W/O SCOPE: CPT

## 2022-01-21 PROCEDURE — 501838 HCHG SUTURE GENERAL: Performed by: ORTHOPAEDIC SURGERY

## 2022-01-21 PROCEDURE — 700111 HCHG RX REV CODE 636 W/ 250 OVERRIDE (IP): Performed by: EMERGENCY MEDICINE

## 2022-01-21 PROCEDURE — 25028 I&D F/ARM&/WRST DP ABSC/HMTM: CPT | Mod: LT | Performed by: ORTHOPAEDIC SURGERY

## 2022-01-21 PROCEDURE — 96375 TX/PRO/DX INJ NEW DRUG ADDON: CPT

## 2022-01-21 PROCEDURE — 160009 HCHG ANES TIME/MIN: Performed by: ORTHOPAEDIC SURGERY

## 2022-01-21 PROCEDURE — 82962 GLUCOSE BLOOD TEST: CPT | Mod: 91

## 2022-01-21 PROCEDURE — 25028 I&D F/ARM&/WRST DP ABSC/HMTM: CPT | Mod: ASROC,LT | Performed by: PHYSICIAN ASSISTANT

## 2022-01-21 PROCEDURE — 99285 EMERGENCY DEPT VISIT HI MDM: CPT

## 2022-01-21 PROCEDURE — 93005 ELECTROCARDIOGRAM TRACING: CPT | Performed by: ORTHOPAEDIC SURGERY

## 2022-01-21 PROCEDURE — 85025 COMPLETE CBC W/AUTO DIFF WBC: CPT

## 2022-01-21 PROCEDURE — 87205 SMEAR GRAM STAIN: CPT

## 2022-01-21 PROCEDURE — 160048 HCHG OR STATISTICAL LEVEL 1-5: Performed by: ORTHOPAEDIC SURGERY

## 2022-01-21 PROCEDURE — 770020 HCHG ROOM/CARE - TELE (206)

## 2022-01-21 PROCEDURE — 96368 THER/DIAG CONCURRENT INF: CPT

## 2022-01-21 PROCEDURE — 99254 IP/OBS CNSLTJ NEW/EST MOD 60: CPT | Mod: 57 | Performed by: ORTHOPAEDIC SURGERY

## 2022-01-21 PROCEDURE — 87641 MR-STAPH DNA AMP PROBE: CPT

## 2022-01-21 PROCEDURE — 700105 HCHG RX REV CODE 258: Performed by: STUDENT IN AN ORGANIZED HEALTH CARE EDUCATION/TRAINING PROGRAM

## 2022-01-21 PROCEDURE — 700105 HCHG RX REV CODE 258: Performed by: EMERGENCY MEDICINE

## 2022-01-21 PROCEDURE — 700102 HCHG RX REV CODE 250 W/ 637 OVERRIDE(OP): Performed by: STUDENT IN AN ORGANIZED HEALTH CARE EDUCATION/TRAINING PROGRAM

## 2022-01-21 PROCEDURE — 87040 BLOOD CULTURE FOR BACTERIA: CPT

## 2022-01-21 PROCEDURE — 83605 ASSAY OF LACTIC ACID: CPT

## 2022-01-21 PROCEDURE — 700111 HCHG RX REV CODE 636 W/ 250 OVERRIDE (IP): Performed by: ANESTHESIOLOGY

## 2022-01-21 PROCEDURE — 700111 HCHG RX REV CODE 636 W/ 250 OVERRIDE (IP): Performed by: STUDENT IN AN ORGANIZED HEALTH CARE EDUCATION/TRAINING PROGRAM

## 2022-01-21 PROCEDURE — 87070 CULTURE OTHR SPECIMN AEROBIC: CPT

## 2022-01-21 PROCEDURE — 85652 RBC SED RATE AUTOMATED: CPT

## 2022-01-21 PROCEDURE — 160027 HCHG SURGERY MINUTES - 1ST 30 MINS LEVEL 2: Performed by: ORTHOPAEDIC SURGERY

## 2022-01-21 PROCEDURE — 0241U HCHG SARS-COV-2 COVID-19 NFCT DS RESP RNA 4 TRGT MIC: CPT

## 2022-01-21 PROCEDURE — 160002 HCHG RECOVERY MINUTES (STAT): Performed by: ORTHOPAEDIC SURGERY

## 2022-01-21 PROCEDURE — A9270 NON-COVERED ITEM OR SERVICE: HCPCS | Performed by: STUDENT IN AN ORGANIZED HEALTH CARE EDUCATION/TRAINING PROGRAM

## 2022-01-21 PROCEDURE — 86140 C-REACTIVE PROTEIN: CPT

## 2022-01-21 PROCEDURE — 87147 CULTURE TYPE IMMUNOLOGIC: CPT

## 2022-01-21 PROCEDURE — 87077 CULTURE AEROBIC IDENTIFY: CPT | Mod: 91

## 2022-01-21 PROCEDURE — 73120 X-RAY EXAM OF HAND: CPT | Mod: LT

## 2022-01-21 PROCEDURE — 99223 1ST HOSP IP/OBS HIGH 75: CPT | Performed by: STUDENT IN AN ORGANIZED HEALTH CARE EDUCATION/TRAINING PROGRAM

## 2022-01-21 PROCEDURE — 160035 HCHG PACU - 1ST 60 MINS PHASE I: Performed by: ORTHOPAEDIC SURGERY

## 2022-01-21 PROCEDURE — 96366 THER/PROPH/DIAG IV INF ADDON: CPT

## 2022-01-21 PROCEDURE — 87075 CULTR BACTERIA EXCEPT BLOOD: CPT

## 2022-01-21 PROCEDURE — 700111 HCHG RX REV CODE 636 W/ 250 OVERRIDE (IP)

## 2022-01-21 PROCEDURE — 87186 SC STD MICRODIL/AGAR DIL: CPT

## 2022-01-21 PROCEDURE — 71045 X-RAY EXAM CHEST 1 VIEW: CPT

## 2022-01-21 PROCEDURE — 87086 URINE CULTURE/COLONY COUNT: CPT

## 2022-01-21 PROCEDURE — 0KBD0ZZ EXCISION OF LEFT HAND MUSCLE, OPEN APPROACH: ICD-10-PCS | Performed by: ORTHOPAEDIC SURGERY

## 2022-01-21 RX ORDER — PROMETHAZINE HYDROCHLORIDE 25 MG/1
12.5-25 SUPPOSITORY RECTAL EVERY 4 HOURS PRN
Status: DISCONTINUED | OUTPATIENT
Start: 2022-01-21 | End: 2022-01-25 | Stop reason: HOSPADM

## 2022-01-21 RX ORDER — DEXTROSE MONOHYDRATE 25 G/50ML
50 INJECTION, SOLUTION INTRAVENOUS
Status: DISCONTINUED | OUTPATIENT
Start: 2022-01-21 | End: 2022-01-25 | Stop reason: HOSPADM

## 2022-01-21 RX ORDER — LABETALOL HYDROCHLORIDE 5 MG/ML
10 INJECTION, SOLUTION INTRAVENOUS EVERY 4 HOURS PRN
Status: DISCONTINUED | OUTPATIENT
Start: 2022-01-21 | End: 2022-01-21

## 2022-01-21 RX ORDER — AMOXICILLIN 250 MG
2 CAPSULE ORAL 2 TIMES DAILY
Status: DISCONTINUED | OUTPATIENT
Start: 2022-01-22 | End: 2022-01-25 | Stop reason: HOSPADM

## 2022-01-21 RX ORDER — PROMETHAZINE HYDROCHLORIDE 25 MG/1
12.5-25 TABLET ORAL EVERY 4 HOURS PRN
Status: DISCONTINUED | OUTPATIENT
Start: 2022-01-21 | End: 2022-01-25 | Stop reason: HOSPADM

## 2022-01-21 RX ORDER — GUAIFENESIN/DEXTROMETHORPHAN 100-10MG/5
10 SYRUP ORAL EVERY 6 HOURS PRN
Status: DISCONTINUED | OUTPATIENT
Start: 2022-01-21 | End: 2022-01-25 | Stop reason: HOSPADM

## 2022-01-21 RX ORDER — ONDANSETRON 4 MG/1
4 TABLET, ORALLY DISINTEGRATING ORAL EVERY 4 HOURS PRN
Status: DISCONTINUED | OUTPATIENT
Start: 2022-01-21 | End: 2022-01-25 | Stop reason: HOSPADM

## 2022-01-21 RX ORDER — POLYETHYLENE GLYCOL 3350 17 G/17G
1 POWDER, FOR SOLUTION ORAL
Status: DISCONTINUED | OUTPATIENT
Start: 2022-01-21 | End: 2022-01-25 | Stop reason: HOSPADM

## 2022-01-21 RX ORDER — SODIUM CHLORIDE, SODIUM LACTATE, POTASSIUM CHLORIDE, CALCIUM CHLORIDE 600; 310; 30; 20 MG/100ML; MG/100ML; MG/100ML; MG/100ML
INJECTION, SOLUTION INTRAVENOUS CONTINUOUS
Status: DISCONTINUED | OUTPATIENT
Start: 2022-01-21 | End: 2022-01-25 | Stop reason: HOSPADM

## 2022-01-21 RX ORDER — HYDRALAZINE HYDROCHLORIDE 20 MG/ML
10 INJECTION INTRAMUSCULAR; INTRAVENOUS
Status: DISCONTINUED | OUTPATIENT
Start: 2022-01-21 | End: 2022-01-21

## 2022-01-21 RX ORDER — PROCHLORPERAZINE EDISYLATE 5 MG/ML
5-10 INJECTION INTRAMUSCULAR; INTRAVENOUS EVERY 4 HOURS PRN
Status: DISCONTINUED | OUTPATIENT
Start: 2022-01-21 | End: 2022-01-25 | Stop reason: HOSPADM

## 2022-01-21 RX ORDER — HYDRALAZINE HYDROCHLORIDE 20 MG/ML
INJECTION INTRAMUSCULAR; INTRAVENOUS
Status: COMPLETED
Start: 2022-01-21 | End: 2022-01-21

## 2022-01-21 RX ORDER — ONDANSETRON 2 MG/ML
INJECTION INTRAMUSCULAR; INTRAVENOUS PRN
Status: DISCONTINUED | OUTPATIENT
Start: 2022-01-21 | End: 2022-01-21 | Stop reason: SURG

## 2022-01-21 RX ORDER — LISINOPRIL 5 MG/1
2.5 TABLET ORAL EVERY EVENING
Status: DISCONTINUED | OUTPATIENT
Start: 2022-01-21 | End: 2022-01-25 | Stop reason: HOSPADM

## 2022-01-21 RX ORDER — HYDRALAZINE HYDROCHLORIDE 10 MG/1
10 TABLET, FILM COATED ORAL EVERY 6 HOURS PRN
Status: DISCONTINUED | OUTPATIENT
Start: 2022-01-21 | End: 2022-01-25 | Stop reason: HOSPADM

## 2022-01-21 RX ORDER — IBUPROFEN 200 MG
200 TABLET ORAL EVERY 6 HOURS PRN
Status: ON HOLD | COMMUNITY
End: 2022-01-24

## 2022-01-21 RX ORDER — SODIUM CHLORIDE, SODIUM LACTATE, POTASSIUM CHLORIDE, AND CALCIUM CHLORIDE .6; .31; .03; .02 G/100ML; G/100ML; G/100ML; G/100ML
30 INJECTION, SOLUTION INTRAVENOUS ONCE
Status: COMPLETED | OUTPATIENT
Start: 2022-01-21 | End: 2022-01-21

## 2022-01-21 RX ORDER — HYDROMORPHONE HYDROCHLORIDE 1 MG/ML
1 INJECTION, SOLUTION INTRAMUSCULAR; INTRAVENOUS; SUBCUTANEOUS ONCE
Status: COMPLETED | OUTPATIENT
Start: 2022-01-21 | End: 2022-01-21

## 2022-01-21 RX ORDER — ONDANSETRON 2 MG/ML
4 INJECTION INTRAMUSCULAR; INTRAVENOUS EVERY 4 HOURS PRN
Status: DISCONTINUED | OUTPATIENT
Start: 2022-01-21 | End: 2022-01-25 | Stop reason: HOSPADM

## 2022-01-21 RX ORDER — MULTIVITAMIN/IRON/FOLIC ACID 18MG-0.4MG
1 TABLET ORAL EVERY MORNING
COMMUNITY

## 2022-01-21 RX ORDER — HALOPERIDOL 5 MG/ML
1 INJECTION INTRAMUSCULAR
Status: DISCONTINUED | OUTPATIENT
Start: 2022-01-21 | End: 2022-01-21 | Stop reason: HOSPADM

## 2022-01-21 RX ORDER — LIDOCAINE HYDROCHLORIDE 20 MG/ML
INJECTION, SOLUTION EPIDURAL; INFILTRATION; INTRACAUDAL; PERINEURAL PRN
Status: DISCONTINUED | OUTPATIENT
Start: 2022-01-21 | End: 2022-01-21 | Stop reason: SURG

## 2022-01-21 RX ORDER — MORPHINE SULFATE 4 MG/ML
2 INJECTION INTRAVENOUS
Status: DISCONTINUED | OUTPATIENT
Start: 2022-01-21 | End: 2022-01-25 | Stop reason: HOSPADM

## 2022-01-21 RX ORDER — HYDRALAZINE HYDROCHLORIDE 20 MG/ML
10 INJECTION INTRAMUSCULAR; INTRAVENOUS
Status: DISCONTINUED | OUTPATIENT
Start: 2022-01-21 | End: 2022-01-21 | Stop reason: HOSPADM

## 2022-01-21 RX ORDER — DIPHENHYDRAMINE HYDROCHLORIDE 50 MG/ML
12.5 INJECTION INTRAMUSCULAR; INTRAVENOUS
Status: DISCONTINUED | OUTPATIENT
Start: 2022-01-21 | End: 2022-01-21 | Stop reason: HOSPADM

## 2022-01-21 RX ORDER — ONDANSETRON 2 MG/ML
4 INJECTION INTRAMUSCULAR; INTRAVENOUS
Status: DISCONTINUED | OUTPATIENT
Start: 2022-01-21 | End: 2022-01-21 | Stop reason: HOSPADM

## 2022-01-21 RX ORDER — BISACODYL 10 MG
10 SUPPOSITORY, RECTAL RECTAL
Status: DISCONTINUED | OUTPATIENT
Start: 2022-01-21 | End: 2022-01-25 | Stop reason: HOSPADM

## 2022-01-21 RX ORDER — ACETAMINOPHEN 325 MG/1
650 TABLET ORAL EVERY 6 HOURS PRN
Status: DISCONTINUED | OUTPATIENT
Start: 2022-01-21 | End: 2022-01-25 | Stop reason: HOSPADM

## 2022-01-21 RX ADMIN — FENTANYL CITRATE 25 MCG: 50 INJECTION INTRAMUSCULAR; INTRAVENOUS at 20:35

## 2022-01-21 RX ADMIN — HYDRALAZINE HYDROCHLORIDE 10 MG: 20 INJECTION INTRAMUSCULAR; INTRAVENOUS at 20:25

## 2022-01-21 RX ADMIN — SODIUM CHLORIDE, POTASSIUM CHLORIDE, SODIUM LACTATE AND CALCIUM CHLORIDE: 600; 310; 30; 20 INJECTION, SOLUTION INTRAVENOUS at 19:36

## 2022-01-21 RX ADMIN — VANCOMYCIN HYDROCHLORIDE 2000 MG: 500 INJECTION, POWDER, LYOPHILIZED, FOR SOLUTION INTRAVENOUS at 16:35

## 2022-01-21 RX ADMIN — INSULIN GLARGINE 10 UNITS: 100 INJECTION, SOLUTION SUBCUTANEOUS at 22:22

## 2022-01-21 RX ADMIN — FENTANYL CITRATE 25 MCG: 50 INJECTION, SOLUTION INTRAMUSCULAR; INTRAVENOUS at 20:40

## 2022-01-21 RX ADMIN — INSULIN HUMAN 4 UNITS: 100 INJECTION, SOLUTION PARENTERAL at 22:23

## 2022-01-21 RX ADMIN — PIPERACILLIN AND TAZOBACTAM 3.38 G: 3; .375 INJECTION, POWDER, LYOPHILIZED, FOR SOLUTION INTRAVENOUS; PARENTERAL at 16:16

## 2022-01-21 RX ADMIN — ACETAMINOPHEN 650 MG: 325 TABLET, FILM COATED ORAL at 22:35

## 2022-01-21 RX ADMIN — MORPHINE SULFATE 2 MG: 4 INJECTION INTRAVENOUS at 22:24

## 2022-01-21 RX ADMIN — HYDROMORPHONE HYDROCHLORIDE 1 MG: 1 INJECTION, SOLUTION INTRAMUSCULAR; INTRAVENOUS; SUBCUTANEOUS at 16:08

## 2022-01-21 RX ADMIN — PIPERACILLIN AND TAZOBACTAM 4.5 G: 4; .5 INJECTION, POWDER, LYOPHILIZED, FOR SOLUTION INTRAVENOUS; PARENTERAL at 22:17

## 2022-01-21 RX ADMIN — PROPOFOL 200 MG: 10 INJECTION, EMULSION INTRAVENOUS at 19:40

## 2022-01-21 RX ADMIN — ONDANSETRON 4 MG: 2 INJECTION INTRAMUSCULAR; INTRAVENOUS at 19:50

## 2022-01-21 RX ADMIN — SODIUM CHLORIDE, POTASSIUM CHLORIDE, SODIUM LACTATE AND CALCIUM CHLORIDE 2313 ML: 600; 310; 30; 20 INJECTION, SOLUTION INTRAVENOUS at 15:32

## 2022-01-21 RX ADMIN — LISINOPRIL 2.5 MG: 5 TABLET ORAL at 22:35

## 2022-01-21 RX ADMIN — LIDOCAINE HYDROCHLORIDE 100 MG: 20 INJECTION, SOLUTION EPIDURAL; INFILTRATION; INTRACAUDAL at 19:40

## 2022-01-21 RX ADMIN — FENTANYL CITRATE 25 MCG: 50 INJECTION, SOLUTION INTRAMUSCULAR; INTRAVENOUS at 20:35

## 2022-01-21 ASSESSMENT — PAIN DESCRIPTION - PAIN TYPE
TYPE: SURGICAL PAIN
TYPE: ACUTE PAIN
TYPE: SURGICAL PAIN

## 2022-01-21 ASSESSMENT — LIFESTYLE VARIABLES: SUBSTANCE_ABUSE: 1

## 2022-01-21 ASSESSMENT — FIBROSIS 4 INDEX
FIB4 SCORE: 0.63
FIB4 SCORE: 0.33
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
## 2022-01-21 NOTE — ED TRIAGE NOTES
Chief Complaint   Patient presents with   • Hand Pain     In left hand x 1 week after metal sliver went in his hand, pt tried to get it out but unsure is metal sliver is out. pt pale and hypotensive in triage. left hand edematous/red and has yellowish discharge coming out.     Educated on triage process. Instructed to notify staff for any worsening symptoms. Denies any recent travel. Denies exposure to known covid positive patients. Denies any respiratory symptoms.

## 2022-01-22 ENCOUNTER — APPOINTMENT (OUTPATIENT)
Dept: RADIOLOGY | Facility: MEDICAL CENTER | Age: 47
DRG: 853 | End: 2022-01-22
Attending: HOSPITALIST
Payer: MEDICAID

## 2022-01-22 LAB
ALBUMIN SERPL BCP-MCNC: 3.1 G/DL (ref 3.2–4.9)
ALBUMIN/GLOB SERPL: 0.9 G/DL
ALP SERPL-CCNC: 161 U/L (ref 30–99)
ALT SERPL-CCNC: 19 U/L (ref 2–50)
ANION GAP SERPL CALC-SCNC: 9 MMOL/L (ref 7–16)
AST SERPL-CCNC: 12 U/L (ref 12–45)
BASOPHILS # BLD AUTO: 0.2 % (ref 0–1.8)
BASOPHILS # BLD: 0.04 K/UL (ref 0–0.12)
BILIRUB SERPL-MCNC: 0.7 MG/DL (ref 0.1–1.5)
BUN SERPL-MCNC: 13 MG/DL (ref 8–22)
CALCIUM SERPL-MCNC: 8.5 MG/DL (ref 8.5–10.5)
CHLORIDE SERPL-SCNC: 101 MMOL/L (ref 96–112)
CHOLEST SERPL-MCNC: 75 MG/DL (ref 100–199)
CO2 SERPL-SCNC: 22 MMOL/L (ref 20–33)
CREAT SERPL-MCNC: 1.37 MG/DL (ref 0.5–1.4)
EKG IMPRESSION: NORMAL
EOSINOPHIL # BLD AUTO: 0 K/UL (ref 0–0.51)
EOSINOPHIL NFR BLD: 0 % (ref 0–6.9)
ERYTHROCYTE [DISTWIDTH] IN BLOOD BY AUTOMATED COUNT: 37.8 FL (ref 35.9–50)
GLOBULIN SER CALC-MCNC: 3.4 G/DL (ref 1.9–3.5)
GLUCOSE BLD-MCNC: 167 MG/DL (ref 65–99)
GLUCOSE BLD-MCNC: 167 MG/DL (ref 65–99)
GLUCOSE BLD-MCNC: 220 MG/DL (ref 65–99)
GLUCOSE SERPL-MCNC: 187 MG/DL (ref 65–99)
HCT VFR BLD AUTO: 38.2 % (ref 42–52)
HDLC SERPL-MCNC: 40 MG/DL
HGB BLD-MCNC: 12.9 G/DL (ref 14–18)
IMM GRANULOCYTES # BLD AUTO: 0.13 K/UL (ref 0–0.11)
IMM GRANULOCYTES NFR BLD AUTO: 0.6 % (ref 0–0.9)
INR PPP: 1.12 (ref 0.87–1.13)
LACTATE BLD-SCNC: 1.2 MMOL/L (ref 0.5–2)
LDLC SERPL CALC-MCNC: 28 MG/DL
LYMPHOCYTES # BLD AUTO: 1.28 K/UL (ref 1–4.8)
LYMPHOCYTES NFR BLD: 6.2 % (ref 22–41)
MAGNESIUM SERPL-MCNC: 1.6 MG/DL (ref 1.5–2.5)
MCH RBC QN AUTO: 27.4 PG (ref 27–33)
MCHC RBC AUTO-ENTMCNC: 33.8 G/DL (ref 33.7–35.3)
MCV RBC AUTO: 81.3 FL (ref 81.4–97.8)
MONOCYTES # BLD AUTO: 2.03 K/UL (ref 0–0.85)
MONOCYTES NFR BLD AUTO: 9.8 % (ref 0–13.4)
NEUTROPHILS # BLD AUTO: 17.14 K/UL (ref 1.82–7.42)
NEUTROPHILS NFR BLD: 83.2 % (ref 44–72)
NRBC # BLD AUTO: 0 K/UL
NRBC BLD-RTO: 0 /100 WBC
PLATELET # BLD AUTO: 377 K/UL (ref 164–446)
PMV BLD AUTO: 9.2 FL (ref 9–12.9)
POTASSIUM SERPL-SCNC: 3.8 MMOL/L (ref 3.6–5.5)
PROCALCITONIN SERPL-MCNC: 0.3 NG/ML
PROT SERPL-MCNC: 6.5 G/DL (ref 6–8.2)
PROTHROMBIN TIME: 14.1 SEC (ref 12–14.6)
RBC # BLD AUTO: 4.7 M/UL (ref 4.7–6.1)
SODIUM SERPL-SCNC: 132 MMOL/L (ref 135–145)
TRIGL SERPL-MCNC: 35 MG/DL (ref 0–149)
WBC # BLD AUTO: 20.6 K/UL (ref 4.8–10.8)

## 2022-01-22 PROCEDURE — 770020 HCHG ROOM/CARE - TELE (206)

## 2022-01-22 PROCEDURE — 700105 HCHG RX REV CODE 258: Performed by: STUDENT IN AN ORGANIZED HEALTH CARE EDUCATION/TRAINING PROGRAM

## 2022-01-22 PROCEDURE — 76775 US EXAM ABDO BACK WALL LIM: CPT

## 2022-01-22 PROCEDURE — 83735 ASSAY OF MAGNESIUM: CPT

## 2022-01-22 PROCEDURE — 83605 ASSAY OF LACTIC ACID: CPT

## 2022-01-22 PROCEDURE — 84145 PROCALCITONIN (PCT): CPT

## 2022-01-22 PROCEDURE — 99233 SBSQ HOSP IP/OBS HIGH 50: CPT | Performed by: HOSPITALIST

## 2022-01-22 PROCEDURE — 80061 LIPID PANEL: CPT

## 2022-01-22 PROCEDURE — 85610 PROTHROMBIN TIME: CPT

## 2022-01-22 PROCEDURE — A9270 NON-COVERED ITEM OR SERVICE: HCPCS | Performed by: STUDENT IN AN ORGANIZED HEALTH CARE EDUCATION/TRAINING PROGRAM

## 2022-01-22 PROCEDURE — 99024 POSTOP FOLLOW-UP VISIT: CPT | Performed by: ORTHOPAEDIC SURGERY

## 2022-01-22 PROCEDURE — A9270 NON-COVERED ITEM OR SERVICE: HCPCS | Performed by: HOSPITALIST

## 2022-01-22 PROCEDURE — 36415 COLL VENOUS BLD VENIPUNCTURE: CPT

## 2022-01-22 PROCEDURE — 97165 OT EVAL LOW COMPLEX 30 MIN: CPT

## 2022-01-22 PROCEDURE — 85025 COMPLETE CBC W/AUTO DIFF WBC: CPT

## 2022-01-22 PROCEDURE — 87040 BLOOD CULTURE FOR BACTERIA: CPT

## 2022-01-22 PROCEDURE — 700102 HCHG RX REV CODE 250 W/ 637 OVERRIDE(OP): Performed by: STUDENT IN AN ORGANIZED HEALTH CARE EDUCATION/TRAINING PROGRAM

## 2022-01-22 PROCEDURE — 93010 ELECTROCARDIOGRAM REPORT: CPT | Performed by: INTERNAL MEDICINE

## 2022-01-22 PROCEDURE — 82962 GLUCOSE BLOOD TEST: CPT | Mod: 91

## 2022-01-22 PROCEDURE — 700111 HCHG RX REV CODE 636 W/ 250 OVERRIDE (IP): Performed by: STUDENT IN AN ORGANIZED HEALTH CARE EDUCATION/TRAINING PROGRAM

## 2022-01-22 PROCEDURE — 700102 HCHG RX REV CODE 250 W/ 637 OVERRIDE(OP): Performed by: HOSPITALIST

## 2022-01-22 PROCEDURE — 80053 COMPREHEN METABOLIC PANEL: CPT

## 2022-01-22 RX ORDER — HEPARIN SODIUM 5000 [USP'U]/ML
5000 INJECTION, SOLUTION INTRAVENOUS; SUBCUTANEOUS EVERY 8 HOURS
Status: DISCONTINUED | OUTPATIENT
Start: 2022-01-22 | End: 2022-01-22

## 2022-01-22 RX ADMIN — INSULIN HUMAN 3 UNITS: 100 INJECTION, SOLUTION PARENTERAL at 08:35

## 2022-01-22 RX ADMIN — INSULIN GLARGINE 10 UNITS: 100 INJECTION, SOLUTION SUBCUTANEOUS at 17:22

## 2022-01-22 RX ADMIN — PIPERACILLIN AND TAZOBACTAM 4.5 G: 4; .5 INJECTION, POWDER, LYOPHILIZED, FOR SOLUTION INTRAVENOUS; PARENTERAL at 04:50

## 2022-01-22 RX ADMIN — SODIUM CHLORIDE, POTASSIUM CHLORIDE, SODIUM LACTATE AND CALCIUM CHLORIDE: 600; 310; 30; 20 INJECTION, SOLUTION INTRAVENOUS at 09:55

## 2022-01-22 RX ADMIN — INSULIN HUMAN 4 UNITS: 100 INJECTION, SOLUTION PARENTERAL at 21:00

## 2022-01-22 RX ADMIN — LISINOPRIL 2.5 MG: 5 TABLET ORAL at 17:18

## 2022-01-22 RX ADMIN — PIPERACILLIN AND TAZOBACTAM 4.5 G: 4; .5 INJECTION, POWDER, LYOPHILIZED, FOR SOLUTION INTRAVENOUS; PARENTERAL at 20:50

## 2022-01-22 RX ADMIN — INSULIN HUMAN 3 UNITS: 100 INJECTION, SOLUTION PARENTERAL at 17:21

## 2022-01-22 RX ADMIN — SODIUM CHLORIDE, POTASSIUM CHLORIDE, SODIUM LACTATE AND CALCIUM CHLORIDE: 600; 310; 30; 20 INJECTION, SOLUTION INTRAVENOUS at 20:55

## 2022-01-22 RX ADMIN — PIPERACILLIN AND TAZOBACTAM 4.5 G: 4; .5 INJECTION, POWDER, LYOPHILIZED, FOR SOLUTION INTRAVENOUS; PARENTERAL at 13:49

## 2022-01-22 RX ADMIN — RIVAROXABAN 10 MG: 10 TABLET, FILM COATED ORAL at 17:19

## 2022-01-22 ASSESSMENT — LIFESTYLE VARIABLES
AVERAGE NUMBER OF DAYS PER WEEK YOU HAVE A DRINK CONTAINING ALCOHOL: 0
DOES PATIENT WANT TO STOP DRINKING: CANNOT ASSESS
HAVE YOU EVER FELT YOU SHOULD CUT DOWN ON YOUR DRINKING: NO
EVER HAD A DRINK FIRST THING IN THE MORNING TO STEADY YOUR NERVES TO GET RID OF A HANGOVER: NO
TOTAL SCORE: 0
TOTAL SCORE: 0
ON A TYPICAL DAY WHEN YOU DRINK ALCOHOL HOW MANY DRINKS DO YOU HAVE: 0
HAVE PEOPLE ANNOYED YOU BY CRITICIZING YOUR DRINKING: NO
TOTAL SCORE: 0
CONSUMPTION TOTAL: NEGATIVE
EVER FELT BAD OR GUILTY ABOUT YOUR DRINKING: NO
HOW MANY TIMES IN THE PAST YEAR HAVE YOU HAD 5 OR MORE DRINKS IN A DAY: 0
ALCOHOL_USE: NO

## 2022-01-22 ASSESSMENT — COGNITIVE AND FUNCTIONAL STATUS - GENERAL
DAILY ACTIVITIY SCORE: 23
SUGGESTED CMS G CODE MODIFIER DAILY ACTIVITY: CH
MOBILITY SCORE: 24
SUGGESTED CMS G CODE MODIFIER DAILY ACTIVITY: CI
SUGGESTED CMS G CODE MODIFIER MOBILITY: CH
DAILY ACTIVITIY SCORE: 24
DRESSING REGULAR LOWER BODY CLOTHING: A LITTLE

## 2022-01-22 ASSESSMENT — FIBROSIS 4 INDEX: FIB4 SCORE: 0.33

## 2022-01-22 ASSESSMENT — ENCOUNTER SYMPTOMS
HEADACHES: 0
SHORTNESS OF BREATH: 0
ABDOMINAL PAIN: 0
DIZZINESS: 0
NAUSEA: 0
CHILLS: 0
FEVER: 0
COUGH: 0
VOMITING: 0
PALPITATIONS: 0

## 2022-01-22 ASSESSMENT — PAIN DESCRIPTION - PAIN TYPE
TYPE: ACUTE PAIN
TYPE: ACUTE PAIN

## 2022-01-22 ASSESSMENT — PATIENT HEALTH QUESTIONNAIRE - PHQ9
2. FEELING DOWN, DEPRESSED, IRRITABLE, OR HOPELESS: NOT AT ALL
SUM OF ALL RESPONSES TO PHQ9 QUESTIONS 1 AND 2: 0
1. LITTLE INTEREST OR PLEASURE IN DOING THINGS: NOT AT ALL
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
## 2022-01-22 NOTE — ED NOTES
Attempted to call report. Talked to charge who is still assigning nurse. Charge states she will call back

## 2022-01-22 NOTE — ANESTHESIA PROCEDURE NOTES
Airway    Date/Time: 1/21/2022 7:40 PM  Performed by: Randal Zhao M.D.  Authorized by: Randal Zhao M.D.     Location:  OR  Urgency:  Elective  Indications for Airway Management:  Anesthesia      Spontaneous Ventilation: absent    Sedation Level:  Deep  Preoxygenated: Yes    Final Airway Type:  Supraglottic airway  Final Supraglottic Airway:  Standard LMA    SGA Size:  4  Number of Attempts at Approach:  1

## 2022-01-22 NOTE — ASSESSMENT & PLAN NOTE
L. Hand cellulitis    L. Hand xray showing: :  1. Dorsal soft tissue swelling. 2. No focal bony destruction however osteomyelitis is not excluded by plain film. 3. Mild osteoarthritis. 4. Artifact limits exam.     1/21 I&D of left hand orthopedic surgery  WCx group b strep and MSSA  cont ancef

## 2022-01-22 NOTE — CONSULTS
1/21/2022    The patient was seen at the request of Dr Inman    HPI: Quoc Guillermo Jr. is a 47 y.o. male who presents with complaints of pain to left hand and arm.  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  The pain is 5/10 and is described as sharp.  The pain is made worse by palpation of the area and made better by rest and immobilization.    Past Medical History:   Diagnosis Date   • Diabetes ([facility])    • Methamphetamine abuse ([facility]) 05/08/2020    Reports last use about 1 year ago       Past Surgical History:   Procedure Laterality Date   • TOE AMPUTATION Right 11/24/2020    Procedure: AMPUTATION, TOE - 2ND;  Surgeon: Tim Gilliam M.D.;  Location: SURGERY [facility];  Service: Orthopedics   • TENOTOMY Right 11/24/2020    Procedure: TENOTOMY - 3-5 PERCUTANEOUS;  Surgeon: Tim Gilliam M.D.;  Location: SURGERY [facility];  Service: Orthopedics   • TOE AMPUTATION Right 4/17/2020    Procedure: AMPUTATION, TOE - GREAT TOE;  Surgeon: Breezy Robledo M.D.;  Location: SURGERY [facility];  Service: Orthopedics   • OTHER ORTHOPEDIC SURGERY      pin placed per pt       Medications  No current facility-administered medications on file prior to encounter.     Current Outpatient Medications on File Prior to Encounter   Medication Sig Dispense Refill   • Multiple Vitamins-Minerals (CENTRUM ADULTS) Tab tablet Take 1 Tablet by mouth every morning.     • ibuprofen (MOTRIN) 200 MG Tab Take 200 mg by mouth every 6 hours as needed for Mild Pain.         Allergies  Patient has no known allergies.    ROS  Left hand pain. All other systems were reviewed and found to be negative    Family History   Problem Relation Age of Onset   • Diabetes Mother    • Heart Disease Mother    • Alcohol abuse Maternal Grandfather        Social History     Socioeconomic History   • Marital status: Other     Spouse name: Not on file   • Number of children: Not on file   • Years of education: Not on file   • Highest education  "level: Not on file   Occupational History   • Not on file   Tobacco Use   • Smoking status: Current Every Day Smoker     Packs/day: 0.25     Types: Cigarettes   • Smokeless tobacco: Never Used   Substance and Sexual Activity   • Alcohol use: Not Currently   • Drug use: Yes     Types: Inhaled     Comment: marijuana   • Sexual activity: Not on file   Other Topics Concern   • Not on file   Social History Narrative   • Not on file     Social Determinants of Health     Financial Resource Strain:    • Difficulty of Paying Living Expenses: Not on file   Food Insecurity:    • Worried About Running Out of Food in the Last Year: Not on file   • Ran Out of Food in the Last Year: Not on file   Transportation Needs:    • Lack of Transportation (Medical): Not on file   • Lack of Transportation (Non-Medical): Not on file   Physical Activity:    • Days of Exercise per Week: Not on file   • Minutes of Exercise per Session: Not on file   Stress:    • Feeling of Stress : Not on file   Social Connections:    • Frequency of Communication with Friends and Family: Not on file   • Frequency of Social Gatherings with Friends and Family: Not on file   • Attends Gnosticism Services: Not on file   • Active Member of Clubs or Organizations: Not on file   • Attends Club or Organization Meetings: Not on file   • Marital Status: Not on file   Intimate Partner Violence:    • Fear of Current or Ex-Partner: Not on file   • Emotionally Abused: Not on file   • Physically Abused: Not on file   • Sexually Abused: Not on file   Housing Stability:    • Unable to Pay for Housing in the Last Year: Not on file   • Number of Places Lived in the Last Year: Not on file   • Unstable Housing in the Last Year: Not on file       Physical Exam  Vitals  BP (!) 188/107   Pulse 89   Temp 37.2 °C (98.9 °F) (Temporal)   Resp 17   Ht 1.702 m (5' 7\")   Wt 77.1 kg (170 lb)   SpO2 98%   General: Well Developed, Well Nourished, Age appropriate appearance  HEENT: " Normocephalic, atraumatic  Psych: Normal mood and affect  Neck: Supple, nontender, no masses  Lungs: Breathing unlabored, No audible wheezing  Heart: Regular heart rate and rhythm  Abdomen: Soft, NT, ND  Neuro: Sensation grossly intact to BUE and BLE, moving all four extremities  Skin: Intact, no open wounds  Vascular: 2+rad/Uln, Capillary refill <2 seconds  MSK: left dorsal hand and forearm wounds      Radiographs:  DX-HAND 2- LEFT   Final Result      1.  Dorsal soft tissue swelling.   2.  No focal bony destruction however osteomyelitis is not excluded by plain film.   3.  Mild osteoarthritis.   4.  Artifact limits exam.      DX-CHEST-PORTABLE (1 VIEW)   Final Result      No acute cardiopulmonary disease.      CT-HAND WITH PLUS RECONS LEFT    (Results Pending)       Laboratory Values  Recent Labs     01/21/22  1445   WBC 13.5*   RBC 5.42   HEMOGLOBIN 14.9   HEMATOCRIT 44.4   MCV 81.9   MCH 27.5   MCHC 33.6*   RDW 38.4   PLATELETCT 401   MPV 9.4     Recent Labs     01/21/22  1445   SODIUM 130*   POTASSIUM 4.2   CHLORIDE 96   CO2 22   GLUCOSE 420*   BUN 11             Impression: Left dorsal hand and forearm abscess    Plan:We discussed the diagnosis and findings with the patient at length.  We reviewed possible non operative and operative interventions and the risks and benefits of each of these.  he had a chance to ask questions and all of these were answered to his satisfaction. The patient chose to proceed with  operative intervention. Risks and benefits of surgery were discussed which include but are not limited to bleeding, infection, neurovascular damage, malunion, nonunion, instability, limb length discrepancy, DVT, PE, MI, Stroke and death. They understand these risks and wish to proceed.

## 2022-01-22 NOTE — PROGRESS NOTES
Patient to floor with RN on monitor in stable condition. VSS. Surgical dressing clean dry intact. AXo4 and on RA. Resting comfortably in bed. No belongings. No family. No further needs.

## 2022-01-22 NOTE — CARE PLAN
The patient is Watcher - Medium risk of patient condition declining or worsening           Problem: Knowledge Deficit - Standard  Goal: Patient and family/care givers will demonstrate understanding of plan of care, disease process/condition, diagnostic tests and medications  Outcome: Progressing     Problem: Hemodynamics  Goal: Patient's hemodynamics, fluid balance and neurologic status will be stable or improve  Outcome: Progressing     Problem: Fluid Volume  Goal: Fluid volume balance will be maintained  Outcome: Progressing

## 2022-01-22 NOTE — HOSPITAL COURSE
47-year-old male with past medical history of diabetes mellitus, hypertension, depression, meth abuse presenting with left hand pain. He was admitted for sepsis secondary to left hand cellulitis with abscess and started on empiric antibiotics. Orthopedics was consulted and he underwent I&D of left dorsal hand abscess on 1/21.  Wound cultures grew MSSA and group B strep.  Patient antibiotics were changed to Ancef.  Patient's hospitalization was complicated by development of AISHA.  Nephrology consulted.

## 2022-01-22 NOTE — PROGRESS NOTES
"   Orthopaedic PA Progress Note    Interval changes:Did well overnight. Denies pain this morning, feels much better    ROS - Patient denies any new issues. No chest pain, dyspnea, or fever.  Pain well controlled.    /89   Pulse 88   Temp (!) 2.6 °C (36.7 °F) (Temporal)   Resp 17   Ht 1.702 m (5' 7\")   Wt 90.2 kg (198 lb 13.7 oz)   SpO2 97%     Patient seen and examined  No acute distress  Breathing non labored  RRR -Dressing intact, no strikethrough              -EPL/FPL intact              -SILT M/U/R/AIN/PIN/Msc/Ax nerves              -+WF/WE/EDC//IO/terminal digit flex/ext              -compartments soft and compressible              -pulses palpable, brisk capillary refill      Recent Labs     01/21/22  1445 01/22/22  0117   WBC 13.5* 20.6*   RBC 5.42 4.70   HEMOGLOBIN 14.9 12.9*   HEMATOCRIT 44.4 38.2*   MCV 81.9 81.3*   MCH 27.5 27.4   MCHC 33.6* 33.8   RDW 38.4 37.8   PLATELETCT 401 377   MPV 9.4 9.2     Active Hospital Problems    Diagnosis    • Type 2 diabetes mellitus with hyperglycemia, without long-term current use of insulin ([facility]) [E11.65]      Priority: Medium   • HTN (hypertension) [I10]    • Sepsis ([facility]) [A41.9]    • Cellulitis [L03.90]    • Nicotine dependence [F17.200]    • Methamphetamine abuse ([facility]) [F15.10]      Assessment/Plan:  POD#1  S/P I_D , wound closure L hand, I+D Lforearm  Wt bearing status - AT  PT/OT-initiated  Wound care:I will change dressing tomorrow prn  Drains - no  Jorge-no  Sutures/Staples out- 10-14 days post operatively  Antibiotics: per Med/ID consult may be beneficial  DVT Prophylaxis- TEDS/SCDs/Foot pumps.   Lovenox: not indicated if ambulatory  Future Procedures - none  Case Coordination for Discharge Planning - Disposition Clear for disposition (home/SNF/IRF) from Orthopaedic team standpoint. Follow-Up: needs appointment with Dr. Pelayo at  [facility] at 10-14 days post-op for re-evaluation and suture removal.        "

## 2022-01-22 NOTE — ANESTHESIA TIME REPORT
Anesthesia Start and Stop Event Times     Date Time Event    1/21/2022 1923 Ready for Procedure     1936 Anesthesia Start     2003 Anesthesia Stop        Responsible Staff  01/21/22    Name Role Begin End    Randal Zhao M.D. Anesth 1936 2003        Preop Diagnosis (Free Text):  Pre-op Diagnosis     right hand abscess        Preop Diagnosis (Codes):    Premium Reason  A. 3PM - 7AM    Comments:

## 2022-01-22 NOTE — PROGRESS NOTES
Monitor summary:     0.14/0.07/0.29     Sinus Rhythm/Sinus Tachycardia 96 - 110     with no ectopy

## 2022-01-22 NOTE — PROGRESS NOTES
Hospital Medicine Daily Progress Note    Date of Service  1/22/2022    Chief Complaint  Quoc Guillermo Jr. is a 47 y.o. male admitted 1/21/2022 with left hand pain    Hospital Course  47-year-old male with past medical history of diabetes mellitus, hypertension, depression, meth abuse presenting with left hand pain. He was admitted for sepsis secondary to left hand cellulitis with abscess and started on empiric antibiotics. Orthopedics was consulted and he underwent I&D of left dorsal hand abscess on 1/21.      Interval Problem Update  Patient tolerated surgery well yesterday  Patient now with AISHA, cont IVF. Check renal US  MRSA nares negative  WBC trending up- likely related to post op  Patient is feeling otherwise well with minimal pain in his left hand    I have personally seen and examined the patient at bedside. I discussed the plan of care with patient and bedside RN.    Consultants/Specialty  orthopedics    Code Status  Full Code    Disposition  Patient is not medically cleared for discharge.   Anticipate discharge to to home with close outpatient follow-up.  I have placed the appropriate orders for post-discharge needs.    Review of Systems  Review of Systems   Constitutional: Negative for chills and fever.   Respiratory: Negative for cough and shortness of breath.    Cardiovascular: Negative for chest pain and palpitations.   Gastrointestinal: Negative for abdominal pain, nausea and vomiting.   Genitourinary: Negative for dysuria and urgency.   Neurological: Negative for dizziness and headaches.   All other systems reviewed and are negative.       Physical Exam  Temp:  [2.6 °C (36.7 °F)-37.8 °C (100 °F)] 2.6 °C (36.7 °F)  Pulse:  [] 88  Resp:  [16-20] 17  BP: ()/() 143/89  SpO2:  [94 %-100 %] 97 %    Physical Exam  Vitals and nursing note reviewed.   Constitutional:       Appearance: Normal appearance.   Cardiovascular:      Rate and Rhythm: Normal rate and regular rhythm.      Heart  sounds: No murmur heard.      Pulmonary:      Effort: Pulmonary effort is normal. No respiratory distress.      Breath sounds: Normal breath sounds.   Musculoskeletal:      Comments: Dressing over left hand   Neurological:      General: No focal deficit present.      Mental Status: He is alert and oriented to person, place, and time.         Fluids    Intake/Output Summary (Last 24 hours) at 1/22/2022 0821  Last data filed at 1/21/2022 2003  Gross per 24 hour   Intake 500 ml   Output --   Net 500 ml       Laboratory  Recent Labs     01/21/22  1445 01/22/22  0117   WBC 13.5* 20.6*   RBC 5.42 4.70   HEMOGLOBIN 14.9 12.9*   HEMATOCRIT 44.4 38.2*   MCV 81.9 81.3*   MCH 27.5 27.4   MCHC 33.6* 33.8   RDW 38.4 37.8   PLATELETCT 401 377   MPV 9.4 9.2     Recent Labs     01/21/22  1445 01/22/22  0117   SODIUM 130* 132*   POTASSIUM 4.2 3.8   CHLORIDE 96 101   CO2 22 22   GLUCOSE 420* 187*   BUN 11 13   CREATININE 0.96 1.37   CALCIUM 9.6 8.5     Recent Labs     01/22/22  0117   INR 1.12         Recent Labs     01/22/22  0117   TRIGLYCERIDE 35   HDL 40   LDL 28       Imaging  DX-HAND 2- LEFT   Final Result      1.  Dorsal soft tissue swelling.   2.  No focal bony destruction however osteomyelitis is not excluded by plain film.   3.  Mild osteoarthritis.   4.  Artifact limits exam.      DX-CHEST-PORTABLE (1 VIEW)   Final Result      No acute cardiopulmonary disease.           Assessment/Plan  * Cellulitis- (present on admission)  Assessment & Plan  L. Hand cellulitis    L. Hand xray showing: :  1. Dorsal soft tissue swelling. 2. No focal bony destruction however osteomyelitis is not excluded by plain film. 3. Mild osteoarthritis. 4. Artifact limits exam.     Vancomycin and Zosyn started in ED    I & D planned with orthopedic surgery, Dr. Inman, NPO after midnight      Nicotine dependence- (present on admission)  Assessment & Plan  30 py hx of smoking and current 1/2 ppd smoker  Smoking education discussed and assistance in  smoking cessation offered >5 minutes  Nicotine patch offered    Sepsis (HCC)- (present on admission)  Assessment & Plan  This is Sepsis Present on admission  SIRS criteria identified on my evaluation include: Leukocytosis, with WBC greater than 12,000  Source is unk  Sepsis protocol initiated  Fluid resuscitation ordered per protocol  IV antibiotics as appropriate for source of sepsis  While organ dysfunction may be noted elsewhere in this problem list or in the chart, degree of organ dysfunction does not meet CMS criteria for severe sepsis          HTN (hypertension)- (present on admission)  Assessment & Plan  Admitted with telemetry  Continue to monitor   Continue home meds    Methamphetamine abuse (HCC)- (present on admission)  Assessment & Plan  Last smoked meth a few days ago per patient  EKG ordered   Admitted with telemetry    Type 2 diabetes mellitus with hyperglycemia, without long-term current use of insulin (HCC)- (present on admission)  Assessment & Plan  ISS  Accuchecks  Diabetic education  A1C       VTE prophylaxis: heparin ppx    I have performed a physical exam and reviewed and updated ROS and Plan today (1/22/2022). In review of yesterday's note (1/21/2022), there are no changes except as documented above.

## 2022-01-22 NOTE — PROGRESS NOTES
Bedside report received from CARTER Chairez. POC discussed with pt; all questions answered at this time.

## 2022-01-22 NOTE — PROGRESS NOTES
Received report from previous nurse regarding prior 12 hours.  Patient currently in OR for surgery.

## 2022-01-22 NOTE — ED NOTES
Med rec completed per patient and significant other at bedside.  Allergies reviewed with patient. No known drug allergies.  Patient denies using any prescription medications, including insulins. All prescription medications (insulin glargine, lisinopril, metformin) removed from med rec at this time.  No oral antibiotics in last 30 days.  Patient's preferred pharmacy: [facility] on [facility].

## 2022-01-22 NOTE — ANESTHESIA POSTPROCEDURE EVALUATION
Patient: Quoc Guillermo Jr.    Procedure Summary     Date: 01/21/22 Room / Location: [place] [address] / SURGERY [facility]    Anesthesia Start: 1936 Anesthesia Stop: 2003    Procedure: INCISION AND DRAINAGE, HAND (Left Hand) Diagnosis: (Right hand abscess)    Surgeons: El Pelayo M.D. Responsible Provider: Randal Zhao M.D.    Anesthesia Type: general ASA Status: 3 - Emergent          Final Anesthesia Type: general  Last vitals  BP   Blood Pressure: (!) 182/77    Temp   37.7 °C (99.9 °F)    Pulse   (!) 116   Resp   19    SpO2   96 %      Anesthesia Post Evaluation    Patient location during evaluation: PACU  Patient participation: complete - patient participated  Level of consciousness: awake and alert    Airway patency: patent  Anesthetic complications: no  Cardiovascular status: hemodynamically stable  Respiratory status: acceptable  Hydration status: euvolemic    PONV: none          There were no known complications for this encounter.     Nurse Pain Score: 0 (NPRS)

## 2022-01-22 NOTE — THERAPY
Physical Therapy Contact Note    Patient Name: Quoc Guillermo Jr.  Age:  47 y.o., Sex:  male  Medical Record #: [id number]  Today's Date: 1/22/2022 01/22/22 1044   Interdisciplinary Plan of Care Collaboration   IDT Collaboration with  Nursing   Patient Position at End of Therapy In Bed;Call Light within Reach;Tray Table within Reach;Phone within Reach;Bed Alarm On   Collaboration Comments  PT consult received.  Pt reports currently at baseline functional independence w/ no concerns for completing functional mobility tasks at home.  No acute PT needs at this time.  Completing order.  Please re consult should there be a change in the pt's condition.   Session Information   Date / Session Number  1/22- no acute PT needs       Lucian WALTON, PT, DPT [id number]

## 2022-01-22 NOTE — ASSESSMENT & PLAN NOTE
30 py hx of smoking and current 1/2 ppd smoker  Smoking education discussed and assistance in smoking cessation offered >5 minutes  Nicotine patch offered

## 2022-01-22 NOTE — ANESTHESIA PREPROCEDURE EVALUATION
Case: [id number] Date/Time: 01/21/22 1945    Procedure: INCISION AND DRAINAGE, HAND (Left Hand)    Pre-op diagnosis: right hand abscess    Location: [facility] OR 16 / SURGERY [facility]    Surgeons: El Pelayo M.D.          Relevant Problems   CARDIAC   (positive) HTN (hypertension)      ENDO   (positive) Type 2 diabetes mellitus with hyperglycemia, without long-term current use of insulin (HCC)      Other   (positive) Septic arthritis of right foot (HCC)   (positive) Subacute osteomyelitis of right foot (HCC)       Physical Exam    Airway   Mallampati: III  TM distance: <3 FB  Neck ROM: limited       Cardiovascular   Rhythm: regular  Rate: normal     Dental       Very poor dentition   Pulmonary   Breath sounds clear to auscultation     Abdominal    Neurological - normal exam                 Anesthesia Plan    ASA 3- EMERGENT   ASA physical status emergent criteria: acutely contaminated wound or identified infection source    Plan - general       Airway plan will be LMA          Induction: intravenous      Pertinent diagnostic labs and testing reviewed    Informed Consent:    Anesthetic plan and risks discussed with patient.
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
## 2022-01-22 NOTE — OP REPORT
DATE OF OPERATION: 1/21/2022     PREOPERATIVE DIAGNOSIS: Left dorsal hand abscess    POSTOPERATIVE DIAGNOSIS: Same    PROCEDURE PERFORMED:  1. Irrigation and debridement left dorsal hand abscess                                                      SURGEON: El Pelayo M.D.     ASSISTANT: Chino Shabazz PA-C    ANESTHESIOLOGIST: Randal Zhao MD.    ANESTHESIA: General    ESTIMATED BLOOD LOSS: 0 mL    INDICATIONS: The patient is a 47 y.o. male with a left hand abscess.  I discussed the risks and benefits of the procedure, including the risks of infection, wound healing complication, neurovascular injury, need for repeat irrigation and debridement, post infectious arthritis and the medical risks of anesthesia including DVT, PE, MI, stroke, and death.  Benefits include eradication of infection and resolution of sepsis.  Alternatives to surgery were also discussed, including non-operative management.  The patient signed the informed consent and the operative extremity was marked.      PROCEDURE:  The patient underwent anesthesia, and was positioned supine on the operating room table and all bony prominences were well padded.  Preoperative antibiotics were held until cultures could be obtained. Sequential compression devices were employed. The correct operative site was prepped and draped into a sterile field. A procedural pause was conducted to verify correct patient, correct extremity, presence of the surgeons initials on the operative extremity.    The hand abscess was debrided of skin subcutaneous tissue and muscle in excisional fashion with knife. The abscess was cultured and irrigated with normal saline solution. Wound was closed with 3-0 nylon.  Sterile dressings were applied.     The patient tolerated the procedure well. There were no apparent complications. All sponge, needle, and instrument counts were correct on two separate occasions. He was awakened, extubated, and transferred to the recovery room in  satisfactory condition.     The use of Chino Shabazz as a surgical assistant was necessary for assistance with exposure, retraction, and closure.    Post-Operative Plan:    1.  The patient should remain full weightbearing on their operative extremity.  Gait aids (crutch or crutches, cane, walker) may be used as needed, and may be discontinued when no longer required.  2.  IV antibiotics - will be given postoperatively and adjusted by the Infectious Disease service as dictated by culture results.  3.  DVT prophylaxis - SCD's and Lovenox 40 mg SQ daily while inpatient.  The patient may transition to Aspirin 325 mg PO BID as an outpatient  4.  Discharge planning  ____________________________________   El Pelayo M.D.   DD: 1/21/2022  8:25 PM

## 2022-01-22 NOTE — WOUND TEAM
Wound team consulted for left hand cellulitis. Patient underwent I&D of left dorsal hand abscess on 1/21/22 with Dr. Pelayo. Review of OP report states that patient's wound was closed in OR and currently has a sterile dressing in place. Please consult surgeon for any concerns regarding patient's incision. Please re-consult wound team as needed. Consult completed.

## 2022-01-23 PROBLEM — N17.9 AKI (ACUTE KIDNEY INJURY) (HCC): Status: ACTIVE | Noted: 2022-01-23

## 2022-01-23 LAB
ANION GAP SERPL CALC-SCNC: 13 MMOL/L (ref 7–16)
ANION GAP SERPL CALC-SCNC: 14 MMOL/L (ref 7–16)
BACTERIA UR CULT: NORMAL
BUN SERPL-MCNC: 28 MG/DL (ref 8–22)
BUN SERPL-MCNC: 32 MG/DL (ref 8–22)
CALCIUM SERPL-MCNC: 8.4 MG/DL (ref 8.5–10.5)
CALCIUM SERPL-MCNC: 8.7 MG/DL (ref 8.5–10.5)
CHLORIDE SERPL-SCNC: 99 MMOL/L (ref 96–112)
CHLORIDE SERPL-SCNC: 99 MMOL/L (ref 96–112)
CK SERPL-CCNC: 57 U/L (ref 0–154)
CO2 SERPL-SCNC: 20 MMOL/L (ref 20–33)
CO2 SERPL-SCNC: 22 MMOL/L (ref 20–33)
CREAT SERPL-MCNC: 4.59 MG/DL (ref 0.5–1.4)
CREAT SERPL-MCNC: 4.91 MG/DL (ref 0.5–1.4)
ERYTHROCYTE [DISTWIDTH] IN BLOOD BY AUTOMATED COUNT: 37.8 FL (ref 35.9–50)
GLUCOSE BLD-MCNC: 107 MG/DL (ref 65–99)
GLUCOSE BLD-MCNC: 117 MG/DL (ref 65–99)
GLUCOSE BLD-MCNC: 129 MG/DL (ref 65–99)
GLUCOSE BLD-MCNC: 238 MG/DL (ref 65–99)
GLUCOSE BLD-MCNC: 67 MG/DL (ref 65–99)
GLUCOSE SERPL-MCNC: 81 MG/DL (ref 65–99)
GLUCOSE SERPL-MCNC: 84 MG/DL (ref 65–99)
GRAM STN SPEC: NORMAL
HCT VFR BLD AUTO: 39.4 % (ref 42–52)
HGB BLD-MCNC: 13.2 G/DL (ref 14–18)
MCH RBC QN AUTO: 27.4 PG (ref 27–33)
MCHC RBC AUTO-ENTMCNC: 33.5 G/DL (ref 33.7–35.3)
MCV RBC AUTO: 81.7 FL (ref 81.4–97.8)
PLATELET # BLD AUTO: 296 K/UL (ref 164–446)
PMV BLD AUTO: 9.5 FL (ref 9–12.9)
POTASSIUM SERPL-SCNC: 4 MMOL/L (ref 3.6–5.5)
POTASSIUM SERPL-SCNC: 4.1 MMOL/L (ref 3.6–5.5)
RBC # BLD AUTO: 4.82 M/UL (ref 4.7–6.1)
SIGNIFICANT IND 70042: NORMAL
SIGNIFICANT IND 70042: NORMAL
SITE SITE: NORMAL
SITE SITE: NORMAL
SODIUM SERPL-SCNC: 132 MMOL/L (ref 135–145)
SODIUM SERPL-SCNC: 135 MMOL/L (ref 135–145)
SOURCE SOURCE: NORMAL
SOURCE SOURCE: NORMAL
WBC # BLD AUTO: 16.7 K/UL (ref 4.8–10.8)

## 2022-01-23 PROCEDURE — 85027 COMPLETE CBC AUTOMATED: CPT

## 2022-01-23 PROCEDURE — 770020 HCHG ROOM/CARE - TELE (206)

## 2022-01-23 PROCEDURE — 99233 SBSQ HOSP IP/OBS HIGH 50: CPT | Performed by: HOSPITALIST

## 2022-01-23 PROCEDURE — 99255 IP/OBS CONSLTJ NEW/EST HI 80: CPT | Performed by: INTERNAL MEDICINE

## 2022-01-23 PROCEDURE — 700111 HCHG RX REV CODE 636 W/ 250 OVERRIDE (IP): Performed by: HOSPITALIST

## 2022-01-23 PROCEDURE — 700105 HCHG RX REV CODE 258: Performed by: STUDENT IN AN ORGANIZED HEALTH CARE EDUCATION/TRAINING PROGRAM

## 2022-01-23 PROCEDURE — 36415 COLL VENOUS BLD VENIPUNCTURE: CPT

## 2022-01-23 PROCEDURE — 99024 POSTOP FOLLOW-UP VISIT: CPT | Performed by: ORTHOPAEDIC SURGERY

## 2022-01-23 PROCEDURE — 82962 GLUCOSE BLOOD TEST: CPT | Mod: 91

## 2022-01-23 PROCEDURE — 82550 ASSAY OF CK (CPK): CPT

## 2022-01-23 PROCEDURE — 700111 HCHG RX REV CODE 636 W/ 250 OVERRIDE (IP): Performed by: STUDENT IN AN ORGANIZED HEALTH CARE EDUCATION/TRAINING PROGRAM

## 2022-01-23 PROCEDURE — 80048 BASIC METABOLIC PNL TOTAL CA: CPT

## 2022-01-23 RX ORDER — HEPARIN SODIUM 5000 [USP'U]/ML
5000 INJECTION, SOLUTION INTRAVENOUS; SUBCUTANEOUS EVERY 8 HOURS
Status: DISCONTINUED | OUTPATIENT
Start: 2022-01-23 | End: 2022-01-25 | Stop reason: HOSPADM

## 2022-01-23 RX ORDER — CEFAZOLIN SODIUM 1 G/50ML
1 INJECTION, SOLUTION INTRAVENOUS EVERY 12 HOURS
Status: DISCONTINUED | OUTPATIENT
Start: 2022-01-23 | End: 2022-01-25 | Stop reason: HOSPADM

## 2022-01-23 RX ADMIN — CEFAZOLIN SODIUM 1 G: 1 INJECTION, SOLUTION INTRAVENOUS at 09:17

## 2022-01-23 RX ADMIN — HEPARIN SODIUM 5000 UNITS: 5000 INJECTION, SOLUTION INTRAVENOUS; SUBCUTANEOUS at 17:54

## 2022-01-23 RX ADMIN — SODIUM CHLORIDE, POTASSIUM CHLORIDE, SODIUM LACTATE AND CALCIUM CHLORIDE: 600; 310; 30; 20 INJECTION, SOLUTION INTRAVENOUS at 05:53

## 2022-01-23 RX ADMIN — PIPERACILLIN AND TAZOBACTAM 4.5 G: 4; .5 INJECTION, POWDER, LYOPHILIZED, FOR SOLUTION INTRAVENOUS; PARENTERAL at 05:54

## 2022-01-23 RX ADMIN — INSULIN GLARGINE 10 UNITS: 100 INJECTION, SOLUTION SUBCUTANEOUS at 17:53

## 2022-01-23 RX ADMIN — CEFAZOLIN SODIUM 1 G: 1 INJECTION, SOLUTION INTRAVENOUS at 17:53

## 2022-01-23 RX ADMIN — INSULIN HUMAN 3 UNITS: 100 INJECTION, SOLUTION PARENTERAL at 21:30

## 2022-01-23 ASSESSMENT — ENCOUNTER SYMPTOMS
PALPITATIONS: 0
COUGH: 0
ABDOMINAL PAIN: 0
VOMITING: 0
DIZZINESS: 0
CHILLS: 0
HEADACHES: 0
NAUSEA: 0
FEVER: 0
SHORTNESS OF BREATH: 0

## 2022-01-23 ASSESSMENT — PAIN DESCRIPTION - PAIN TYPE: TYPE: ACUTE PAIN
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
## 2022-01-23 NOTE — PROGRESS NOTES
Hospital Medicine Daily Progress Note    Date of Service  1/23/2022    Chief Complaint  Quoc Guillermo Jr. is a 47 y.o. male admitted 1/21/2022 with left hand pain    Hospital Course  47-year-old male with past medical history of diabetes mellitus, hypertension, depression, meth abuse presenting with left hand pain. He was admitted for sepsis secondary to left hand cellulitis with abscess and started on empiric antibiotics. Orthopedics was consulted and he underwent I&D of left dorsal hand abscess on 1/21.      Interval Problem Update  Cr markedly up, I have consulted nephrology and ordered repeat BMP  Pt is feeling improved, denies any pain in his left hand, improved ROM  WCx growing Group A strep and MSSA, I have changed abx from zosyn to ancef  I d/w Dr. Najjar    I have personally seen and examined the patient at bedside. I discussed the plan of care with patient and bedside RN.    Consultants/Specialty  nephrology and orthopedics    Code Status  Full Code    Disposition  Patient is not medically cleared for discharge.   Anticipate discharge to to home with close outpatient follow-up.  I have placed the appropriate orders for post-discharge needs.    Review of Systems  Review of Systems   Constitutional: Negative for chills and fever.   Respiratory: Negative for cough and shortness of breath.    Cardiovascular: Negative for chest pain and palpitations.   Gastrointestinal: Negative for abdominal pain, nausea and vomiting.   Genitourinary: Negative for dysuria and urgency.   Neurological: Negative for dizziness and headaches.   All other systems reviewed and are negative.       Physical Exam  Temp:  [36.4 °C (97.6 °F)-37.9 °C (100.3 °F)] 36.4 °C (97.6 °F)  Pulse:  [78-94] 78  Resp:  [15-18] 15  BP: (130-163)/(72-97) 130/81  SpO2:  [93 %-97 %] 93 %    Physical Exam  Vitals and nursing note reviewed.   Constitutional:       Appearance: Normal appearance.   Cardiovascular:      Rate and Rhythm: Normal rate and  regular rhythm.      Heart sounds: No murmur heard.      Pulmonary:      Effort: Pulmonary effort is normal. No respiratory distress.      Breath sounds: Normal breath sounds.   Musculoskeletal:      Comments: Non tender, improved ROM, dressing over left hand   Neurological:      General: No focal deficit present.      Mental Status: He is alert and oriented to person, place, and time.         Fluids    Intake/Output Summary (Last 24 hours) at 1/23/2022 1155  Last data filed at 1/22/2022 2100  Gross per 24 hour   Intake 100 ml   Output --   Net 100 ml       Laboratory  Recent Labs     01/21/22  1445 01/22/22  0117 01/23/22  0615   WBC 13.5* 20.6* 16.7*   RBC 5.42 4.70 4.82   HEMOGLOBIN 14.9 12.9* 13.2*   HEMATOCRIT 44.4 38.2* 39.4*   MCV 81.9 81.3* 81.7   MCH 27.5 27.4 27.4   MCHC 33.6* 33.8 33.5*   RDW 38.4 37.8 37.8   PLATELETCT 401 377 296   MPV 9.4 9.2 9.5     Recent Labs     01/22/22  0117 01/23/22  0248 01/23/22  0615   SODIUM 132* 132* 135   POTASSIUM 3.8 4.1 4.0   CHLORIDE 101 99 99   CO2 22 20 22   GLUCOSE 187* 81 84   BUN 13 28* 32*   CREATININE 1.37 4.59* 4.91*   CALCIUM 8.5 8.4* 8.7     Recent Labs     01/22/22  0117   INR 1.12         Recent Labs     01/22/22  0117   TRIGLYCERIDE 35   HDL 40   LDL 28       Imaging  US-RENAL   Final Result      Normal renal ultrasound. No hydronephrosis. No renal calculus.      DX-HAND 2- LEFT   Final Result      1.  Dorsal soft tissue swelling.   2.  No focal bony destruction however osteomyelitis is not excluded by plain film.   3.  Mild osteoarthritis.   4.  Artifact limits exam.      DX-CHEST-PORTABLE (1 VIEW)   Final Result      No acute cardiopulmonary disease.           Assessment/Plan  * Cellulitis- (present on admission)  Assessment & Plan  L. Hand cellulitis    L. Hand xray showing: :  1. Dorsal soft tissue swelling. 2. No focal bony destruction however osteomyelitis is not excluded by plain film. 3. Mild osteoarthritis. 4. Artifact limits exam.     1/21 I&D of  left hand orthopedic surgery  WCx group b strep and MSSA  Zosyn changed to ancef    AISHA (acute kidney injury) (HCC)  Assessment & Plan  Monitor BMP and assess response  Avoid IV contrast/nephrotoxins/NSAIDs  Dose adjust meds for decreased GFR  Unclear etiology  Nephrology consulted  Checking CPK    Nicotine dependence- (present on admission)  Assessment & Plan  30 py hx of smoking and current 1/2 ppd smoker  Smoking education discussed and assistance in smoking cessation offered >5 minutes  Nicotine patch offered    Sepsis (HCC)- (present on admission)  Assessment & Plan  This is Sepsis Present on admission  SIRS criteria identified on my evaluation include: Leukocytosis, with WBC greater than 12,000  Source is unk  Sepsis protocol initiated  Fluid resuscitation ordered per protocol  IV antibiotics as appropriate for source of sepsis  While organ dysfunction may be noted elsewhere in this problem list or in the chart, degree of organ dysfunction does not meet CMS criteria for severe sepsis          HTN (hypertension)- (present on admission)  Assessment & Plan  Admitted with telemetry  Continue to monitor   Continue home meds    Methamphetamine abuse (HCC)- (present on admission)  Assessment & Plan  Last smoked meth a few days ago per patient  EKG ordered   Admitted with telemetry    Type 2 diabetes mellitus with hyperglycemia, without long-term current use of insulin (HCC)- (present on admission)  Assessment & Plan  ISS  Accuchecks  Diabetic education  A1C 11.4       VTE prophylaxis: heparin ppx    I have performed a physical exam and reviewed and updated ROS and Plan today (1/23/2022). In review of yesterday's note (1/22/2022), there are no changes except as documented above.

## 2022-01-23 NOTE — PROGRESS NOTES
Bedside report received from CARTER Hayward. POC discussed with pt; all questions answered at this time.

## 2022-01-23 NOTE — ASSESSMENT & PLAN NOTE
Monitor BMP and assess response  Avoid IV contrast/nephrotoxins/NSAIDs  Dose adjust meds for decreased GFR  Unclear etiology- renal ultrasound normal. Post void bladder scan normal.  Nephrology consulted  CPK normal  worsening

## 2022-01-23 NOTE — PROGRESS NOTES
"OR cultures pending    /72   Pulse 82   Temp 36.4 °C (97.6 °F) (Temporal)   Resp 16   Ht 1.702 m (5' 7\")   Wt (S) 87.7 kg (193 lb 5.5 oz)   SpO2 95%     Recent Labs     01/21/22  1445 01/22/22  0117 01/23/22  0615   WBC 13.5* 20.6* 16.7*   RBC 5.42 4.70 4.82   HEMOGLOBIN 14.9 12.9* 13.2*   HEMATOCRIT 44.4 38.2* 39.4*   MCV 81.9 81.3* 81.7   MCH 27.5 27.4 27.4   MCHC 33.6* 33.8 33.5*   RDW 38.4 37.8 37.8   PLATELETCT 401 377 296   MPV 9.4 9.2 9.5       POD#2  S/P I&D hand    Plan:  DVT Prophylaxis- TEDS/SCDs  Weight Bearing Status-WBAT  PT/OT  Antibiotics: per ID  Case Coordination    Addendum  Pleasant on exam   Dressing CDI  FROM fingers  Plan dressing change tomorrow if in house  Follow-Up: needs appointment with Dr. Pelayo at  [facility] at 10-14 days post-op for re-evaluation, suture removal and radiographs.      "

## 2022-01-23 NOTE — PROGRESS NOTES
Assumed care of patient, bedside report received from CARTER Givens. Updated on plan of care, call light within reach and fall precautions are in place and bed lock and in lowest position. Patient instructed to call for assistance before getting out of bed. All questions answered at this time. No other needs. Goal for tonight is to monitor pain and sleep.

## 2022-01-23 NOTE — PROGRESS NOTES
"OR cultures still pending    /86   Pulse 86   Temp 36.9 °C (98.4 °F) (Temporal)   Resp 16   Ht 1.702 m (5' 7\")   Wt 90.2 kg (198 lb 13.7 oz)   SpO2 95%     Recent Labs     01/21/22  1445 01/22/22  0117   WBC 13.5* 20.6*   RBC 5.42 4.70   HEMOGLOBIN 14.9 12.9*   HEMATOCRIT 44.4 38.2*   MCV 81.9 81.3*   MCH 27.5 27.4   MCHC 33.6* 33.8   RDW 38.4 37.8   PLATELETCT 401 377   MPV 9.4 9.2       No acute distress  Dressing clean dry and intact  Neurovascularly intact    POD#1  S/PI&D hand    Plan:  DVT Prophylaxis- TEDS/SCDs  Weight Bearing Status-WBAT  PT/OT  Antibiotics: per ID  Case Coordination          "

## 2022-01-23 NOTE — THERAPY
Occupational Therapy   Initial Evaluation     Patient Name: Quoc Guillermo Jr.  Age:  47 y.o., Sex:  male  Medical Record #: [id number]  Today's Date: 1/22/2022          Assessment  Patient is 47 y.o. male presenting with supervision for functional mobility and bed mobility. Patient with RUE AROM/strength WFL, LUE in splint NWB. Patient with good compensatory strategies to perform toile ting, UB/LB dressing and functional mobility task. Patient doesn't require further OT at this time.       Plan    Recommend Occupational Therapy for Evaluation only        Discharge Recommendations:  (Anticipate home once medically cleared)     Subjective    Patient alert and cooperative during session. Nurse rachel occupational therapist to work with patient. Patient with shades on during session due to light sensitivity      Objective    Patient lives in 2nd level apartment, independent at baseline with all ADL's and functional mobility task          01/22/22 1001   Total Time Spent   Total Time Spent (Mins) 23   Charge Group   OT Evaluation OT Evaluation Low   Initial Contact Note    Initial Contact Note Order Received and Verified, Occupational Therapy Evaluation in Progress with Full Report to Follow.   History of Falls   History of Falls No   Pain 0 - 10 Group   Therapist Pain Assessment   (Per patient 0/10 pain in L hand )   Passive ROM Upper Body   Comments RUE WFL, LUE WFL except wrist and hand splinted   Active ROM Upper Body   Comments RUE WFL, LUE WFL except wrist and hand splinted   Strength Upper Body   Comments RUE WFL, LUE WFL except wrist and hand splinted, LUE NWB   Coordination Upper Body   Comments Decreased fine/gross motor coordination    Bed Mobility    Supine to Sit Supervised   Sit to Supine Supervised   ADL Assessment   Lower Body Dressing Supervision  (Donning shoes sitting EOB )   Toileting Supervision   How much help from another person does the patient currently need...   Putting on and taking off regular  lower body clothing? 4   Bathing (including washing, rinsing, and drying)? 4   Toileting, which includes using a toilet, bedpan, or urinal? 4   Putting on and taking off regular upper body clothing? 4   Taking care of personal grooming such as brushing teeth? 4   Eating meals? 4   6 Clicks Daily Activity Score 24   Functional Mobility   Sit to Stand Supervised   Mobility Supervised no device, ambulated to restroom, stood at toilet for toileting with supervision    Activity Tolerance   Comments Good activity tolerance.    Education Group   Additional Comments Edcuated on safety with functional mobility    Anticipated Discharge Equipment and Recommendations   Discharge Recommendations   (Anticipate home once medically cleared)   Interdisciplinary Plan of Care Collaboration   IDT Collaboration with  Nursing   Patient Position at End of Therapy In Bed;Bed Alarm On;Call Light within Reach;Tray Table within Reach;Phone within Reach   Collaboration Comments Nsg functional mobility and ADL status    Session Information   Date / Session Number  Eval only

## 2022-01-23 NOTE — CARE PLAN
Problem: Knowledge Deficit - Standard  Goal: Patient and family/care givers will demonstrate understanding of plan of care, disease process/condition, diagnostic tests and medications  Outcome: Progressing     Problem: Urinary - Renal Perfusion  Goal: Ability to achieve and maintain adequate renal perfusion and functioning will improve  Outcome: Progressing     Problem: Respiratory  Goal: Patient will achieve/maintain optimum respiratory ventilation and gas exchange  Outcome: Progressing     Problem: Pain - Standard  Goal: Alleviation of pain or a reduction in pain to the patient’s comfort goal  Outcome: Progressing   The patient is Stable - Low risk of patient condition declining or worsening    Shift Goals  Clinical Goals: monitor for pain  Patient Goals: sleep  Family Goals: n/a    Progress made toward(s) clinical / shift goals:  Patient has no complaints of pain    Patient is not progressing towards the following goals:

## 2022-01-24 VITALS
WEIGHT: 168.43 LBS | SYSTOLIC BLOOD PRESSURE: 160 MMHG | BODY MASS INDEX: 26.44 KG/M2 | DIASTOLIC BLOOD PRESSURE: 84 MMHG | TEMPERATURE: 98.8 F | RESPIRATION RATE: 17 BRPM | OXYGEN SATURATION: 98 % | HEART RATE: 62 BPM | HEIGHT: 67 IN

## 2022-01-24 LAB
ANION GAP SERPL CALC-SCNC: 13 MMOL/L (ref 7–16)
APPEARANCE UR: CLEAR
BACTERIA SPEC ANAEROBE CULT: NORMAL
BACTERIA WND AEROBE CULT: ABNORMAL
BILIRUB UR QL STRIP.AUTO: NEGATIVE
BUN SERPL-MCNC: 43 MG/DL (ref 8–22)
CALCIUM SERPL-MCNC: 8.5 MG/DL (ref 8.5–10.5)
CHLORIDE SERPL-SCNC: 98 MMOL/L (ref 96–112)
CHLORIDE UR-SCNC: 63 MMOL/L
CO2 SERPL-SCNC: 24 MMOL/L (ref 20–33)
COLOR UR: YELLOW
CREAT SERPL-MCNC: 6.4 MG/DL (ref 0.5–1.4)
CREAT UR-MCNC: 32.33 MG/DL
ERYTHROCYTE [DISTWIDTH] IN BLOOD BY AUTOMATED COUNT: 37 FL (ref 35.9–50)
GLUCOSE BLD-MCNC: 124 MG/DL (ref 65–99)
GLUCOSE BLD-MCNC: 150 MG/DL (ref 65–99)
GLUCOSE SERPL-MCNC: 158 MG/DL (ref 65–99)
GLUCOSE UR STRIP.AUTO-MCNC: NEGATIVE MG/DL
GRAM STN SPEC: ABNORMAL
HCT VFR BLD AUTO: 38.1 % (ref 42–52)
HGB BLD-MCNC: 13 G/DL (ref 14–18)
KETONES UR STRIP.AUTO-MCNC: NEGATIVE MG/DL
LEUKOCYTE ESTERASE UR QL STRIP.AUTO: NEGATIVE
MCH RBC QN AUTO: 27.3 PG (ref 27–33)
MCHC RBC AUTO-ENTMCNC: 34.1 G/DL (ref 33.7–35.3)
MCV RBC AUTO: 80 FL (ref 81.4–97.8)
MICRO URNS: NORMAL
NITRITE UR QL STRIP.AUTO: NEGATIVE
PH UR STRIP.AUTO: 7 [PH] (ref 5–8)
PLATELET # BLD AUTO: 288 K/UL (ref 164–446)
PMV BLD AUTO: 9.1 FL (ref 9–12.9)
POTASSIUM SERPL-SCNC: 4.1 MMOL/L (ref 3.6–5.5)
POTASSIUM UR-SCNC: 8 MMOL/L
PROT UR QL STRIP: NEGATIVE MG/DL
PROT UR-MCNC: 6 MG/DL (ref 0–15)
RBC # BLD AUTO: 4.76 M/UL (ref 4.7–6.1)
RBC UR QL AUTO: NEGATIVE
SIGNIFICANT IND 70042: ABNORMAL
SIGNIFICANT IND 70042: NORMAL
SITE SITE: ABNORMAL
SITE SITE: NORMAL
SODIUM SERPL-SCNC: 135 MMOL/L (ref 135–145)
SODIUM UR-SCNC: 79 MMOL/L
SOURCE SOURCE: ABNORMAL
SOURCE SOURCE: NORMAL
SP GR UR STRIP.AUTO: 1.01
UROBILINOGEN UR STRIP.AUTO-MCNC: 0.2 MG/DL
WBC # BLD AUTO: 9.6 K/UL (ref 4.8–10.8)

## 2022-01-24 PROCEDURE — 84133 ASSAY OF URINE POTASSIUM: CPT

## 2022-01-24 PROCEDURE — 700105 HCHG RX REV CODE 258: Performed by: STUDENT IN AN ORGANIZED HEALTH CARE EDUCATION/TRAINING PROGRAM

## 2022-01-24 PROCEDURE — 700111 HCHG RX REV CODE 636 W/ 250 OVERRIDE (IP): Performed by: HOSPITALIST

## 2022-01-24 PROCEDURE — 85027 COMPLETE CBC AUTOMATED: CPT

## 2022-01-24 PROCEDURE — 82436 ASSAY OF URINE CHLORIDE: CPT

## 2022-01-24 PROCEDURE — 700102 HCHG RX REV CODE 250 W/ 637 OVERRIDE(OP): Performed by: STUDENT IN AN ORGANIZED HEALTH CARE EDUCATION/TRAINING PROGRAM

## 2022-01-24 PROCEDURE — 99239 HOSP IP/OBS DSCHRG MGMT >30: CPT | Performed by: HOSPITALIST

## 2022-01-24 PROCEDURE — 81003 URINALYSIS AUTO W/O SCOPE: CPT

## 2022-01-24 PROCEDURE — 80048 BASIC METABOLIC PNL TOTAL CA: CPT

## 2022-01-24 PROCEDURE — 84300 ASSAY OF URINE SODIUM: CPT

## 2022-01-24 PROCEDURE — 84156 ASSAY OF PROTEIN URINE: CPT

## 2022-01-24 PROCEDURE — 36415 COLL VENOUS BLD VENIPUNCTURE: CPT

## 2022-01-24 PROCEDURE — 82570 ASSAY OF URINE CREATININE: CPT

## 2022-01-24 PROCEDURE — 82962 GLUCOSE BLOOD TEST: CPT

## 2022-01-24 PROCEDURE — 99024 POSTOP FOLLOW-UP VISIT: CPT | Performed by: ORTHOPAEDIC SURGERY

## 2022-01-24 PROCEDURE — 99233 SBSQ HOSP IP/OBS HIGH 50: CPT | Performed by: INTERNAL MEDICINE

## 2022-01-24 RX ORDER — CEPHALEXIN 500 MG/1
500 CAPSULE ORAL 4 TIMES DAILY
Qty: 20 CAPSULE | Refills: 0 | Status: SHIPPED | OUTPATIENT
Start: 2022-01-24 | End: 2022-01-29

## 2022-01-24 RX ADMIN — INSULIN GLARGINE 10 UNITS: 100 INJECTION, SOLUTION SUBCUTANEOUS at 17:24

## 2022-01-24 RX ADMIN — HEPARIN SODIUM 5000 UNITS: 5000 INJECTION, SOLUTION INTRAVENOUS; SUBCUTANEOUS at 13:00

## 2022-01-24 RX ADMIN — CEFAZOLIN SODIUM 1 G: 1 INJECTION, SOLUTION INTRAVENOUS at 05:39

## 2022-01-24 RX ADMIN — CEFAZOLIN SODIUM 1 G: 1 INJECTION, SOLUTION INTRAVENOUS at 17:27

## 2022-01-24 RX ADMIN — INSULIN HUMAN 4 UNITS: 100 INJECTION, SOLUTION PARENTERAL at 17:25

## 2022-01-24 RX ADMIN — HEPARIN SODIUM 5000 UNITS: 5000 INJECTION, SOLUTION INTRAVENOUS; SUBCUTANEOUS at 05:39

## 2022-01-24 RX ADMIN — SODIUM CHLORIDE, POTASSIUM CHLORIDE, SODIUM LACTATE AND CALCIUM CHLORIDE: 600; 310; 30; 20 INJECTION, SOLUTION INTRAVENOUS at 10:57

## 2022-01-24 ASSESSMENT — ENCOUNTER SYMPTOMS
COUGH: 0
ORTHOPNEA: 0
FEVER: 0
MYALGIAS: 0
ABDOMINAL PAIN: 0
WHEEZING: 0
FLANK PAIN: 0
BACK PAIN: 0
EYES NEGATIVE: 1
DIARRHEA: 0
HEADACHES: 0
PALPITATIONS: 0
WEIGHT LOSS: 0
NECK PAIN: 0
SINUS PAIN: 0
SHORTNESS OF BREATH: 0
VOMITING: 0
HEMOPTYSIS: 0
CHILLS: 0
DIZZINESS: 0
NAUSEA: 0

## 2022-01-24 ASSESSMENT — FIBROSIS 4 INDEX: FIB4 SCORE: 0.43

## 2022-01-24 NOTE — CARE PLAN
The patient is Stable - Low risk of patient condition declining or worsening    Shift Goals  Clinical Goals: Monitoring left hand, rest  Patient Goals: sleep  Family Goals: n/a    Progress made toward(s) clinical / shift goals:  Left hand monitored for signs of worsening infection during shift ,pt rested throughout shift.     Patient is not progressing towards the following goals:N/A    Problem: Knowledge Deficit - Standard  Goal: Patient and family/care givers will demonstrate understanding of plan of care, disease process/condition, diagnostic tests and medications  Outcome: Progressing     Problem: Pain - Standard  Goal: Alleviation of pain or a reduction in pain to the patient’s comfort goal  Outcome: Progressing

## 2022-01-24 NOTE — DISCHARGE PLANNING
Care Transition Team Assessment    LSW met with pt at bedside to complete assessment. Pt A&Ox4 and able to verify the information on the face sheet. Pt lives with his S/O in an apartment with no steps to enter. Pt is independent with all ADLs and IADLs. Pt does not use any DME. Pt's S/O provides transportation for him as needed and is his main support.    Pt is employed full-time as a . Pt reported meth and marijuana use (inhaled). Pt denies any MH concerns. Pt does not have an advance directive. LSW educated pt on advanced directives and provided AD packet. Pt stated his S/O will be able to provide transportation home or he will call a cab upon DC.    Information Source  Orientation Level: Oriented X4  Information Given By: Patient  Informant's Name: Jonathon Guillermo  Who is responsible for making decisions for patient? : Patient    Readmission Evaluation  Is this a readmission?: No    Elopement Risk  Legal Hold: No  Ambulatory or Self Mobile in Wheelchair: Yes  Disoriented: No  Psychiatric Symptoms: None  History of Wandering: No  Elopement this Admit: No  Vocalizing Wanting to Leave: No  Displays Behaviors, Body Language Wanting to Leave: No-Not at Risk for Elopement  Elopement Risk: Not at Risk for Elopement    Interdisciplinary Discharge Planning  Patient or legal guardian wants to designate a caregiver: No    Discharge Preparedness  What is your plan after discharge?: Home with help  What are your discharge supports?: Partner  Prior Functional Level: Ambulatory,Independent with Activities of Daily Living,Independent with Medication Management  Difficulity with ADLs: None  Difficulity with IADLs: None    Functional Assesment  Prior Functional Level: Ambulatory,Independent with Activities of Daily Living,Independent with Medication Management    Finances  Financial Barriers to Discharge: No  Prescription Coverage: Yes    Vision / Hearing Impairment  Vision Impairment : No  Hearing Impairment : No    Advance  Directive  Advance Directive?: None  Advance Directive offered?: AD Booklet given    Domestic Abuse  Have you ever been the victim of abuse or violence?: No  Physical Abuse or Sexual Abuse: No  Verbal Abuse or Emotional Abuse: No  Possible Abuse/Neglect Reported to:: Not Applicable    Psychological Assessment  History of Substance Abuse: Methamphetamine,Marijuana  History of Psychiatric Problems: No  Non-compliant with Treatment: No  Newly Diagnosed Illness: No    Discharge Risks or Barriers  Discharge risks or barriers?: No PCP,Substance abuse  Patient risk factors: No PCP,Substance abuse    Anticipated Discharge Information  Discharge Disposition: Discharged to home/self care (01)

## 2022-01-24 NOTE — PROGRESS NOTES
Nephrology Daily Progress Note    Date of Service  1/24/2022    Chief Complaint  Quoc Guillermo Jr. is a 47 y.o. male with hx/of DM II, CKD II, admitted 1/21/2022 with left hand infection, developed AISHA      Interval Problem Update  01/24 -c/o left hand pain  Good UOP  AISHA -worsening creat level -to monitor    Review of Systems  Review of Systems   Constitutional: Negative for chills, fever, malaise/fatigue and weight loss.   HENT: Negative for congestion, hearing loss and sinus pain.    Eyes: Negative.    Respiratory: Negative for cough, hemoptysis, shortness of breath and wheezing.    Cardiovascular: Negative for chest pain, palpitations, orthopnea and leg swelling.   Gastrointestinal: Negative for abdominal pain, diarrhea, nausea and vomiting.   Genitourinary: Negative for dysuria, flank pain, frequency, hematuria and urgency.   Musculoskeletal: Positive for joint pain. Negative for back pain, myalgias and neck pain.   Skin: Negative.    All other systems reviewed and are negative.       Physical Exam  Temp:  [36.2 °C (97.2 °F)-37 °C (98.6 °F)] 36.7 °C (98 °F)  Pulse:  [62-83] 83  Resp:  [16] 16  BP: (129-161)/(66-87) 129/66  SpO2:  [92 %-98 %] 96 %    Physical Exam  Vitals and nursing note reviewed.   Constitutional:       Appearance: Normal appearance.   HENT:      Nose: Nose normal.      Mouth/Throat:      Mouth: Mucous membranes are moist.      Pharynx: Oropharynx is clear.   Eyes:      General: No scleral icterus.     Extraocular Movements: Extraocular movements intact.      Conjunctiva/sclera: Conjunctivae normal.      Pupils: Pupils are equal, round, and reactive to light.   Cardiovascular:      Rate and Rhythm: Normal rate and regular rhythm.      Pulses: Normal pulses.      Heart sounds: Normal heart sounds. No friction rub. No gallop.    Pulmonary:      Effort: Pulmonary effort is normal. No respiratory distress.      Breath sounds: Normal breath sounds. No wheezing, rhonchi or rales.   Abdominal:       General: Bowel sounds are normal. There is no distension.      Palpations: Abdomen is soft. There is no mass.      Tenderness: There is no abdominal tenderness. There is no right CVA tenderness, left CVA tenderness or guarding.   Musculoskeletal:      Cervical back: Normal range of motion and neck supple.      Right lower leg: No edema.      Left lower leg: No edema.      Comments: Left hand covered with dressing   Skin:     Coloration: Skin is not jaundiced or pale.      Findings: No erythema or rash.   Neurological:      General: No focal deficit present.      Mental Status: He is alert and oriented to person, place, and time.   Psychiatric:         Mood and Affect: Mood normal.         Behavior: Behavior normal.         Thought Content: Thought content normal.         Judgment: Judgment normal.         Fluids    Intake/Output Summary (Last 24 hours) at 1/24/2022 1222  Last data filed at 1/24/2022 0836  Gross per 24 hour   Intake 360 ml   Output 1150 ml   Net -790 ml       Laboratory  Recent Labs     01/22/22  0117 01/23/22  0615 01/24/22  0909   WBC 20.6* 16.7* 9.6   RBC 4.70 4.82 4.76   HEMOGLOBIN 12.9* 13.2* 13.0*   HEMATOCRIT 38.2* 39.4* 38.1*   MCV 81.3* 81.7 80.0*   MCH 27.4 27.4 27.3   MCHC 33.8 33.5* 34.1   RDW 37.8 37.8 37.0   PLATELETCT 377 296 288   MPV 9.2 9.5 9.1     Recent Labs     01/23/22  0248 01/23/22  0615 01/24/22  0909   SODIUM 132* 135 135   POTASSIUM 4.1 4.0 4.1   CHLORIDE 99 99 98   CO2 20 22 24   GLUCOSE 81 84 158*   BUN 28* 32* 43*   CREATININE 4.59* 4.91* 6.40*   CALCIUM 8.4* 8.7 8.5     Recent Labs     01/22/22  0117   INR 1.12         Recent Labs     01/22/22  0117   TRIGLYCERIDE 35   HDL 40   LDL 28       Imaging  reviewed    Assessment/Plan  1.AISHA/CKD II -with worsening creat level, non oliguric, continue IVF  2.HTN: BP well controlled -avoid ACEi/ARB for now  3.Electrolytes: well controlled.  4.Anemia: Hb stable  5.Volume:continue IVF    Recs: keep well hydrated  Daily  labs  Monitor UOP  Avoid nephrotoxins  No need for emergent dialysis yet  Will reassess for dialysis needs AM  Will follow

## 2022-01-24 NOTE — CARE PLAN
Problem: Knowledge Deficit - Standard  Goal: Patient and family/care givers will demonstrate understanding of plan of care, disease process/condition, diagnostic tests and medications  Outcome: Progressing  Note: Pt's whiteboard is updated, pt has been updated on POC, all questions have been answered at this time       Problem: Pain - Standard  Goal: Alleviation of pain or a reduction in pain to the patient’s comfort goal  Outcome: Progressing  Note: Pain interventions will allow pt to sleep comfortably. No complaints of pain at this time    The patient is Watcher - Medium risk of patient condition declining or worsening    Shift Goals  Clinical Goals: monitor left hand, abx  Patient Goals: rest  Family Goals: n/a    Progress made toward(s) clinical / shift goals:  rest    Patient is not progressing towards the following goals:

## 2022-01-24 NOTE — PROGRESS NOTES
Bedside report received from night shift RN. Assumed care at 0700. Pt is A&Ox4. Pt is in bed. Pt denies pain at this time. Pt was updated on plan of care. Pt has call light, personal belongings, and bedside table within reach. Bed is in the lowest position. Will continue to monitor

## 2022-01-24 NOTE — PROGRESS NOTES
Assumed care of patient at bedside report from DAY RN. Updated on POC. Patient currently A & O x 4; on 0 L O2; up stand-by; without complaints of acute pain. Call light within reach. Whiteboard updated. Fall precautions in place. Bed locked and in lowest position. All questions answered. No other needs indicated at this time.

## 2022-01-24 NOTE — PROGRESS NOTES
Hospital Medicine Daily Progress Note    Date of Service  1/24/2022    Chief Complaint  Quoc Guillermo Jr. is a 47 y.o. male admitted 1/21/2022 with left hand pain    Hospital Course  47-year-old male with past medical history of diabetes mellitus, hypertension, depression, meth abuse presenting with left hand pain. He was admitted for sepsis secondary to left hand cellulitis with abscess and started on empiric antibiotics. Orthopedics was consulted and he underwent I&D of left dorsal hand abscess on 1/21.  Wound cultures grew MSSA and group B strep.  Patient antibiotics were changed to Ancef.  Patient's hospitalization was complicated by development of AISHA.  Nephrology consulted.      Interval Problem Update  MARIBEL overnight  Cr is 6.4, CPK normal.  Normal renal ultrasound  Patient is feeling well and endorses no pain of left hand and increased ROM    I have personally seen and examined the patient at bedside. I discussed the plan of care with patient, bedside RN, charge RN and .    Consultants/Specialty  nephrology and orthopedics    Code Status  Full Code    Disposition  Patient is not medically cleared for discharge.   Anticipate discharge to to home with close outpatient follow-up.  I have placed the appropriate orders for post-discharge needs.    Review of Systems  Review of Systems   Constitutional: Negative for chills and fever.   Respiratory: Negative for cough and shortness of breath.    Cardiovascular: Negative for chest pain and palpitations.   Gastrointestinal: Negative for abdominal pain, nausea and vomiting.   Genitourinary: Negative for dysuria and urgency.   Neurological: Negative for dizziness and headaches.   All other systems reviewed and are negative.       Physical Exam  Temp:  [36.2 °C (97.2 °F)-37 °C (98.6 °F)] 37 °C (98.6 °F)  Pulse:  [62-78] 67  Resp:  [15-16] 16  BP: (130-161)/(79-87) 142/82  SpO2:  [92 %-98 %] 98 %    Physical Exam  Vitals and nursing note reviewed.    Constitutional:       Appearance: Normal appearance.   Cardiovascular:      Rate and Rhythm: Normal rate and regular rhythm.      Heart sounds: No murmur heard.      Pulmonary:      Effort: Pulmonary effort is normal. No respiratory distress.      Breath sounds: Normal breath sounds.   Musculoskeletal:      Comments: Non tender, improved ROM, dressing over left hand   Neurological:      General: No focal deficit present.      Mental Status: He is alert and oriented to person, place, and time.         Fluids    Intake/Output Summary (Last 24 hours) at 1/24/2022 1039  Last data filed at 1/24/2022 0836  Gross per 24 hour   Intake 360 ml   Output 1150 ml   Net -790 ml       Laboratory  Recent Labs     01/22/22  0117 01/23/22  0615 01/24/22  0909   WBC 20.6* 16.7* 9.6   RBC 4.70 4.82 4.76   HEMOGLOBIN 12.9* 13.2* 13.0*   HEMATOCRIT 38.2* 39.4* 38.1*   MCV 81.3* 81.7 80.0*   MCH 27.4 27.4 27.3   MCHC 33.8 33.5* 34.1   RDW 37.8 37.8 37.0   PLATELETCT 377 296 288   MPV 9.2 9.5 9.1     Recent Labs     01/23/22  0248 01/23/22  0615 01/24/22  0909   SODIUM 132* 135 135   POTASSIUM 4.1 4.0 4.1   CHLORIDE 99 99 98   CO2 20 22 24   GLUCOSE 81 84 158*   BUN 28* 32* 43*   CREATININE 4.59* 4.91* 6.40*   CALCIUM 8.4* 8.7 8.5     Recent Labs     01/22/22  0117   INR 1.12         Recent Labs     01/22/22  0117   TRIGLYCERIDE 35   HDL 40   LDL 28       Imaging  US-RENAL   Final Result      Normal renal ultrasound. No hydronephrosis. No renal calculus.      DX-HAND 2- LEFT   Final Result      1.  Dorsal soft tissue swelling.   2.  No focal bony destruction however osteomyelitis is not excluded by plain film.   3.  Mild osteoarthritis.   4.  Artifact limits exam.      DX-CHEST-PORTABLE (1 VIEW)   Final Result      No acute cardiopulmonary disease.           Assessment/Plan  * Cellulitis- (present on admission)  Assessment & Plan  L. Hand cellulitis    L. Hand xray showing: :  1. Dorsal soft tissue swelling. 2. No focal bony destruction  however osteomyelitis is not excluded by plain film. 3. Mild osteoarthritis. 4. Artifact limits exam.     1/21 I&D of left hand orthopedic surgery  WCx group b strep and MSSA  cont ancef    AISHA (acute kidney injury) (HCC)  Assessment & Plan  Monitor BMP and assess response  Avoid IV contrast/nephrotoxins/NSAIDs  Dose adjust meds for decreased GFR  Unclear etiology- renal ultrasound normal. Post void bladder scan normal.  Nephrology consulted  CPK normal  worsening    Nicotine dependence- (present on admission)  Assessment & Plan  30 py hx of smoking and current 1/2 ppd smoker  Smoking education discussed and assistance in smoking cessation offered >5 minutes  Nicotine patch offered    Sepsis (HCC)- (present on admission)  Assessment & Plan  This is Sepsis Present on admission  SIRS criteria identified on my evaluation include: Leukocytosis, with WBC greater than 12,000  Source is unk  Sepsis protocol initiated  Fluid resuscitation ordered per protocol  IV antibiotics as appropriate for source of sepsis  While organ dysfunction may be noted elsewhere in this problem list or in the chart, degree of organ dysfunction does not meet CMS criteria for severe sepsis          HTN (hypertension)- (present on admission)  Assessment & Plan  Admitted with telemetry  Continue to monitor   Continue home meds    Methamphetamine abuse (HCC)- (present on admission)  Assessment & Plan  Last smoked meth a few days ago per patient  EKG ordered   Admitted with telemetry    Type 2 diabetes mellitus with hyperglycemia, without long-term current use of insulin (HCC)- (present on admission)  Assessment & Plan  ISS  Accuchecks  Diabetic education  A1C 11.4       VTE prophylaxis: heparin ppx    I have performed a physical exam and reviewed and updated ROS and Plan today (1/24/2022). In review of yesterday's note (1/23/2022), there are no changes except as documented above.

## 2022-01-25 LAB
GLUCOSE BLD-MCNC: 196 MG/DL (ref 65–99)
GLUCOSE BLD-MCNC: 219 MG/DL (ref 65–99)

## 2022-01-25 NOTE — PROGRESS NOTES
Assumed care of patient at 1900. Patient is Aox 4. Patient is resting in bed at this time and is requesting to leave AMA; on call MD paged.

## 2022-01-25 NOTE — PROGRESS NOTES
Patient educated on risks of leaving AMA and still requesting to leave. MD updated. Patient is oriented and fully aware of risks.

## 2022-01-25 NOTE — PROGRESS NOTES
Diabetes education: Met with pt x 2 today. Please see consult note.  Plan: For discharge pt will need True metrix meter and supplies as well as Basaglar kwik pens,  Admelog solostar pens ( if fast acting needed) and pen needles all for 30 day supply ( place in [place]) and sent to [facility] pharmacy. CDE will continue to follow and tomorrow have him practice with saline pens using one hand ( He does not think it will be an issue).

## 2022-01-25 NOTE — PROGRESS NOTES
"Doing well  Hand better  Dressing changed - OK to shower    Cx - Mixed skin kai  ROS: no respiratory issues, chest pain nor fever    /83   Pulse 61   Temp 36.3 °C (97.4 °F) (Temporal)   Resp 16   Ht 1.702 m (5' 7\")   Wt 76.4 kg (168 lb 6.9 oz)   SpO2 99%     Recent Labs     01/22/22  0117 01/23/22  0615 01/24/22  0909   WBC 20.6* 16.7* 9.6   RBC 4.70 4.82 4.76   HEMOGLOBIN 12.9* 13.2* 13.0*   HEMATOCRIT 38.2* 39.4* 38.1*   MCV 81.3* 81.7 80.0*   MCH 27.4 27.4 27.3   MCHC 33.8 33.5* 34.1   RDW 37.8 37.8 37.0   PLATELETCT 377 296 288   MPV 9.2 9.5 9.1                 -EPL/FPL intact              -SILT M/U/R/AIN/PIN/Msc/Ax nerves              -+WF/WE/EDC//IO/terminal digit flex/ext              -compartments soft and compressible              -pulses palpable, brisk capillary refill      POD#3  S/P I&D hand  Clinically improved  Clear for disposition (home/SNF/IRF) from Orthopaedic team standpoint.    Plan:  DVT Prophylaxis- TEDS/SCDs  Weight Bearing Status-WBAT  Band aid may be changed daily  PT/OT  Antibiotics: per ID  Follow-Up: needs appointment with Dr. Pelayo at  [facility] at 10-14 days post-op for re-evaluation, suture removal and radiographs.      "

## 2022-01-25 NOTE — DISCHARGE SUMMARY
Hospital Medicine Discharge Note     Admit Date:  1/21/2022       Discharge Date:   1/24/2022    Attending Physician:  Dirk Camejo M.D.     Diagnoses (includes active and resolved):   Principal Problem:    Cellulitis POA: Yes  Active Problems:    Type 2 diabetes mellitus with hyperglycemia, without long-term current use of insulin (HCC) POA: Yes    Methamphetamine abuse (HCC) POA: Yes    HTN (hypertension) POA: Yes    Sepsis (HCC) POA: Yes    Nicotine dependence POA: Yes    AISHA (acute kidney injury) (HCC) POA: Unknown  Resolved Problems:    * No resolved hospital problems. *      Hospital Summary (Brief Narrative):       47-year-old male with past medical history of diabetes mellitus, hypertension, depression, meth abuse presenting with left hand pain. He was admitted for sepsis secondary to left hand cellulitis with abscess and started on empiric antibiotics. Orthopedics was consulted and he underwent I&D of left dorsal hand abscess on 1/21.  Wound cultures grew MSSA and group B strep.  Patient antibiotics were changed to Ancef.  Patient's hospitalization was complicated by development of AISHA.  Nephrology consulted.  Patient then became antsy in the evening and decided to leave AMA. Both RN and I had separate discussions with the patient and he understands the risks of leaving including losing his hand, sepsis, death. I also discussed with him that his kidney function has been worsening and he is close to needing dialysis. If he does not get dialysis when he truly needs it then he could likely die. Patient is opposed to dialysis because he does not want to live like that and his mom used to be on dialysis. I discussed with him that there is a possibility of short-term dialysis depending on how his kidney failure test. You thought about it and decided that he wants to leave anyhow. This is because he is afraid somebody else is going to steal his property. I discussed with him that his life is far more valuable than  property and property is always replaceable. He thought about it some more and decided that he would like to leave anyways. I have prescribed antibiotics to his pharmacy [facility] and he will  the antibiotics tomorrow. I have informed him that these are likely suboptimal given that he likely needs reevaluation of his wound but he would like to proceed anyhow.      The patient met 2-midnight criteria for an inpatient stay at the time of discharge.     Consultants:      Orthopedic surgeon Dr. Pelayo  Orthopedic surgeon Dr. Inman  Nephrologist Dr. Najjar    Procedures:        DATE OF OPERATION: 1/21/2022  PREOPERATIVE DIAGNOSIS: Left dorsal hand abscess  POSTOPERATIVE DIAGNOSIS: Same  PROCEDURE PERFORMED:  1. Irrigation and debridement left dorsal hand abscess                                                    Discharge Medications:           Medication List      ASK your doctor about these medications      Instructions   Centrum Adults Tabs tablet   Take 1 Tablet by mouth every morning.  Dose: 1 Tablet     ibuprofen 200 MG Tabs  Commonly known as: MOTRIN   Take 200 mg by mouth every 6 hours as needed for Mild Pain.  Dose: 200 mg          Disposition:   Discharge home    Activity:   As tolerated    Code status:   Full code    Primary Care Provider:    Pcp Pt States None    Follow up appointment details :      No follow-up provider specified.  No future appointments.  needs appointment with Dr. Pelayo at  [facility] at 10-14 days post-op for re-evaluation, suture removal and radiographs.    Pending Studies:        Needs further recheck of renal labs as well as monitoring to see if hand infection improves    Time spent on discharge day patient visit: 44 minutes    #################################################    Interval history/exam for day of discharge:    Vitals:    01/24/22 1205 01/24/22 1206 01/24/22 1525 01/24/22 1924   BP:  129/66 149/83 160/84    Pulse:  83 61 62   Resp:  16 16 17   Temp:  36.7 °C (98 °F) 36.3 °C (97.4 °F) 37.1 °C (98.8 °F)   TempSrc:  Temporal Temporal Temporal   SpO2:  96% 99% 98%   Weight: 76.4 kg (168 lb 6.9 oz)      Height:         Weight/BMI: Body mass index is 26.38 kg/m².  Pulse Oximetry: 98 %, O2 (LPM): 0, O2 Delivery Device: None - Room Air    General:  NAD  CVS:  RRR  PULM:  CTAB, no respiratory distress  Hand: Surgical wound,    Most Recent Labs:    Lab Results   Component Value Date/Time    WBC 9.6 01/24/2022 09:09 AM    RBC 4.76 01/24/2022 09:09 AM    HEMOGLOBIN 13.0 (L) 01/24/2022 09:09 AM    HEMATOCRIT 38.1 (L) 01/24/2022 09:09 AM    MCV 80.0 (L) 01/24/2022 09:09 AM    MCH 27.3 01/24/2022 09:09 AM    MCHC 34.1 01/24/2022 09:09 AM    MPV 9.1 01/24/2022 09:09 AM    NEUTSPOLYS 83.20 (H) 01/22/2022 01:17 AM    LYMPHOCYTES 6.20 (L) 01/22/2022 01:17 AM    MONOCYTES 9.80 01/22/2022 01:17 AM    EOSINOPHILS 0.00 01/22/2022 01:17 AM    BASOPHILS 0.20 01/22/2022 01:17 AM    ANISOCYTOSIS 1+ 04/15/2020 11:10 PM      Lab Results   Component Value Date/Time    SODIUM 135 01/24/2022 09:09 AM    POTASSIUM 4.1 01/24/2022 09:09 AM    CHLORIDE 98 01/24/2022 09:09 AM    CO2 24 01/24/2022 09:09 AM    GLUCOSE 158 (H) 01/24/2022 09:09 AM    BUN 43 (H) 01/24/2022 09:09 AM    CREATININE 6.40 (HH) 01/24/2022 09:09 AM      Lab Results   Component Value Date/Time    ALTSGPT 19 01/22/2022 01:17 AM    ASTSGOT 12 01/22/2022 01:17 AM    ALKPHOSPHAT 161 (H) 01/22/2022 01:17 AM    TBILIRUBIN 0.7 01/22/2022 01:17 AM    ALBUMIN 3.1 (L) 01/22/2022 01:17 AM    GLOBULIN 3.4 01/22/2022 01:17 AM    INR 1.12 01/22/2022 01:17 AM     Lab Results   Component Value Date/Time    PROTHROMBTM 14.1 01/22/2022 01:17 AM    INR 1.12 01/22/2022 01:17 AM        Imaging/ Testing:      US-RENAL   Final Result      Normal renal ultrasound. No hydronephrosis. No renal calculus.      DX-HAND 2- LEFT   Final Result      1.  Dorsal soft tissue swelling.   2.  No focal bony destruction  however osteomyelitis is not excluded by plain film.   3.  Mild osteoarthritis.   4.  Artifact limits exam.      DX-CHEST-PORTABLE (1 VIEW)   Final Result      No acute cardiopulmonary disease.          Instructions:      The patient was instructed to return to the ER in the event of worsening symptoms. I have counseled the patient on the importance of compliance and the patient has agreed to proceed with all medical recommendations and follow up plan indicated above.   The patient understands that all medications come with benefits and risks. Risks may include permanent injury or death and these risks can be minimized with close reassessment and monitoring.

## 2022-01-25 NOTE — PROGRESS NOTES
Patient IV removed; tele box removed; personal belongings taken with patient. Patient escorted downstairs with this writer. Patient again educated on risks of leaving and still requesting to leave. Patient competent and aware of risks.

## 2022-01-25 NOTE — CONSULTS
Diabetes education: Pt has a hx of diabetes , with prior education given 4/21/20. Pt was admitted with blood sugars of 420, and Hg a1c of 11.4%. Pt is currently on Glargine 10 units pm with regular insulin sliding scale coverage ac and hs . Blood sugars have been 196 ( 3 units), 124, and 150.  Met with pt x 2 for diabetes education. Pt has used insulin pens before, as well as finger sticks but has no supplies. Pt follows with [facility] for PCP.  Discussed need for insulin as well as to practice using insulin pen with one hand. Finger sticks may be an issue.  Pt wanted a cigarette. Explained this is an non smoking facility. CDE offered diet jello and pt agreed. CDE had to order from diet office. At second visit pt states he did get his jello.  Plan: For discharge pt will need True metrix meter and supplies as well as Basaglar kwik pens,  Admelog solostar pens ( if fast acting needed) and pen needles all for 30 day supply ( place in [place]) and sent to [facility] pharmacy. CDE will continue to follow and tomorrow have him practice with saline pens using one hand ( He does not think it will be an issue).     Previous visits:     Haylee Be R.N.   Diabetes Health Educator      Consults   Signed   Date of Service:  4/17/2020  7:33 PM                   Diabetes education:Pt is newly dx with diabetes. Met with pt this afternoon. Pt was admitted with blood sugar of 227 and Hg a1c of 8.6%. Pt is currently getting Lantus 8 units pm with Humalog sliding scale coverage every six hours (please change to ac and hs if eating). Current blood sugars are 218 (2 units) and 260  ( 3 units). Asked pt to give his insulin with nursing and practice his finger sticks ( stick only).  Discussed what diabetes was, type two, hypoglycemia,  hyperglycemia,  goals for blood sugars. Discussed need for carbohydrates and proteins, with every meal and snack as well as why. Discussed the importance of the HS snack with a carbohydrate and  "protein. Discussed need, benefit and precautions with exercise.  Discussed  what effects blood sugars. Discussed need to follow up with PCP.  Insulin was discussed briefly.  Plan: CDE will follow up on Monday as not sure what he will need at discharge. Meter would be True Metrix and insulin if needed would be Admelog vial or pen and Basaglar kwik pen as well as pen needles        Haylee Be R.N.   Diabetes Health Educator      Progress Notes   Signed   Date of Service:  4/20/2020  5:57 PM                   Diabetes education: Met with pt this afternoon. Pt had not given his own insulin with nursing as asked last week. Pt states \"the nurses give it, I want to do pills\". Pt is currently on Lantus 10 units ( increased from 8 units), pm with regular insulin sliding scale coverage ac and hs with blood sugars of 169 (2 units), 132, and 168 ( 2 units).  Spoke with nursing and pt to give his insulin with nursing this evening.   Plan: Pt would prefer oral agents, discussed concerns regarding blood sugars and healing. Pt states he has done finger sticks before. CDE will give and instruct on True Metrix meter tomorrow. Discharge medications: Metformin and Glimepiride or Admelog vial and Basaglar kwik pen, 3/10 cc syringes and pen needles.  He will also need prescription for True metrix test strips and lancets. Please use dm supplies order set at discharge to obtain supplies and dx code needed by Medicaid. CDE will meet pt after 1030 tomorrow. Please call [phone] if questions. Pt could qualify for meds to beds. Please send prescriptions to [facility] pharmacy        Haylee Be R.N.   Diabetes Health Educator      Progress Notes   Signed   Date of Service:  4/21/2020  1:37 PM                   Diabetes education: Met with pt this afternoon. Pt was given and instructed on a True metrix meter and finger sticks. Pt state he as done them before and practiced with nursing.  Pt was taught to use insulin pens, practiced " with saline pens and discussed insulin storage, shelf life and site rotation. Handout given. Reviewed metformin, basaglar and low blood sugar.  Tiger text Dr. Copeland to change Lantus to Basaglar (per insurance) and add true metrix test strips and  Lancets.  Tiger text sent to [facility] and [facility]ruthi to beds. Questions answered . Please call [phone] if needs change

## 2022-01-25 NOTE — DISCHARGE INSTRUCTIONS
Discharge Instructions per Dirk Camejo M.D.    Stop taking ibuprofen. Take antibiotics as prescribed. Try to return to the hospital ASAP.

## 2022-01-26 ENCOUNTER — TELEPHONE (OUTPATIENT)
Dept: NEPHROLOGY | Facility: MEDICAL CENTER | Age: 47
End: 2022-01-26

## 2022-01-26 LAB
BACTERIA BLD CULT: NORMAL
SIGNIFICANT IND 70042: NORMAL
SITE SITE: NORMAL
SOURCE SOURCE: NORMAL

## 2022-01-26 NOTE — TELEPHONE ENCOUNTER
Called and left voice message to home number - asking if patient would like to follow up with nephrology office.   Left office number for patient

## 2022-01-27 LAB
BACTERIA BLD CULT: NORMAL
SIGNIFICANT IND 70042: NORMAL
SITE SITE: NORMAL
SOURCE SOURCE: NORMAL

## 2022-02-01 NOTE — DOCUMENTATION QUERY
[facility]                                                                       Query Response Note      PATIENT:               PARISH RETANA  ACCT #:                  [id number]  MRN:                     [id number]  :                      1975  ADMIT DATE:       2022 2:21 PM  DISCH DATE:        2022 10:25 PM  RESPONDING  PROVIDER #:        [id number]           QUERY TEXT:    He was admitted for sepsis secondary to left hand cellulitis with abscess. This patient was also diagnosed with ATN.  Per coding guidelines, the clinical indicator for severe sepsis is an associated organ failure/organ dysfunction/shock documented as being DUE to the sepsis.  The  is unable to determine if there is a relationship between sepsis and an organ dysfunction.    Based upon the clinical findings, risk factors, and treatment, can the diagnosis of sepsis and any associated relationship(s) be further specified?      The patient's Clinical Indicators include:  CLINICAL INDICATOR  Recent Labs                  22 1445      22    0117   22  0615  CREATININE        0.96                       1.37                 4.91*  WBC                13.5*                       20.6*                16.7*    : PN  ASSESSMENT AND PLAN:  1.  Acute kidney injury.  The etiology is not very clear, most likely acute   tubular necrosis from recent sepsis    * Cellulitis- (present on admission)  Assessment & Plan  L. Hand cellulitis  L. Hand xray showing: :  1. Dorsal soft tissue swelling. 2. No focal bony destruction however osteomyelitis is not excluded by plain film. 3. Mild osteoarthritis. 4. Artifact limits exam.     I&D of left hand orthopedic surgery  WCx group b strep and MSSA    Sepsis (HCC)- (present on admission)  Assessment & Plan  This is Sepsis Present on admission  SIRS criteria identified on my evaluation include: Leukocytosis,  with WBC greater than 12,000  Source is unk    TREATMENT  -Ancef  -Irrigation and debridement left dorsal hand abscess  -Avoid IV contrast/nephrotoxins/NSAIDs    RISK FACTORS  HTN CKD stage 2 with DM; smoker;       CONTACT  Sugar Eldridge CCS  Coding   [email or web address]  Options provided:   -- Sepsis with related organ failure/dysfunction (Severe Sepsis - please specify organ dysfunction/failure/shock)   -- Sepsis without related organ failure/dysfunction (No severe sepsis)   -- Other explanation of clinical findings (Please document)   -- Unable to determine      Query created by: Sugar Eldridge on 1/26/2022 4:43 PM    RESPONSE TEXT:    Unable to determine          Electronically signed by:  JUAN BRADFORD MD 2/1/2022 12:41 PM
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
## (undated) DEVICE — PROTECTOR ULNA NERVE - (36PR/CA)

## (undated) DEVICE — GLOVE BIOGEL INDICATOR SZ 8 SURGICAL PF LTX - (50/BX 4BX/CA)

## (undated) DEVICE — GLOVE BIOGEL SZ 7.5 SURGICAL PF LTX - (50PR/BX 4BX/CA)

## (undated) DEVICE — SUTURE GENERAL

## (undated) DEVICE — BLADE SAGITTAL SAW 9.4MM X 25.5MM X .4MM FINE TOOTH (1/EA)

## (undated) DEVICE — SET EXTENSION WITH 2 PORTS (48EA/CA) ***PART #2C8610 IS A SUBSTITUTE*****

## (undated) DEVICE — KIT ANESTHESIA W/CIRCUIT & 3/LT BAG W/FILTER (20EA/CA)

## (undated) DEVICE — NEPTUNE 4 PORT MANIFOLD - (20/PK)

## (undated) DEVICE — LACTATED RINGERS INJ 1000 ML - (14EA/CA 60CA/PF)

## (undated) DEVICE — TUBING CLEARLINK DUO-VENT - C-FLO (48EA/CA)

## (undated) DEVICE — PAD LAP STERILE 18 X 18 - (5/PK 40PK/CA)

## (undated) DEVICE — GLOVE BIOGEL SZ 6.5 SURGICAL PF LTX (50PR/BX 4BX/CA)

## (undated) DEVICE — SUCTION INSTRUMENT YANKAUER BULBOUS TIP W/O VENT (50EA/CA)

## (undated) DEVICE — CHLORAPREP 26 ML APPLICATOR - ORANGE TINT(25/CA)

## (undated) DEVICE — PADDING CAST 4 IN STERILE - 4 X 4 YDS (24/CA)

## (undated) DEVICE — HEAD HOLDER JUNIOR/ADULT

## (undated) DEVICE — Device

## (undated) DEVICE — SET LEADWIRE 5 LEAD BEDSIDE DISPOSABLE ECG (1SET OF 5/EA)

## (undated) DEVICE — SUTURE 3-0 ETHILON FS-1 - (36/BX) 30 INCH

## (undated) DEVICE — GOWN WARMING STANDARD FLEX - (30/CA)

## (undated) DEVICE — ELECTRODE DUAL RETURN W/ CORD - (50/PK)

## (undated) DEVICE — TOURNIQUET CUFF 34 X 4 ONE PORT DISP - STERILE (10/BX)

## (undated) DEVICE — PACK UPPER EXTREMITY (2EA/CA)

## (undated) DEVICE — SODIUM CHL IRRIGATION 0.9% 1000ML (12EA/CA)

## (undated) DEVICE — STOCKINET BIAS 4 IN STERILE - (20/CA)

## (undated) DEVICE — MASK ANESTHESIA ADULT  - (100/CA)

## (undated) DEVICE — SUTURE 2-0 VICRYL PLUS CT-1 - 8 X 18 INCH(12/BX)

## (undated) DEVICE — CANISTER SUCTION 3000ML MECHANICAL FILTER AUTO SHUTOFF MEDI-VAC NONSTERILE LF DISP  (40EA/CA)

## (undated) DEVICE — SENSOR SPO2 NEO LNCS ADHESIVE (20/BX) SEE USER NOTES

## (undated) DEVICE — GLOVE BIOGEL INDICATOR SZ 7.5 SURGICAL PF LTX - (50PR/BX 4BX/CA)

## (undated) DEVICE — SLEEVE, VASO, THIGH, MED

## (undated) DEVICE — DRESSING 3X3 ADAPTIC GAUZE - (50EA/CT)

## (undated) DEVICE — WRAP CO-FLEX 4IN X 5YD STERIL - SELF-ADHERENT (18/CA)

## (undated) DEVICE — GLOVE SZ 7 BIOGEL PI MICRO - PF LF (50PR/BX 4BX/CA)

## (undated) DEVICE — PACK LOWER EXTREMITY - (2/CA)

## (undated) DEVICE — ELECTRODE 850 FOAM ADHESIVE - HYDROGEL RADIOTRNSPRNT (50/PK)

## (undated) DEVICE — PAD PREP 24 X 48 CUFFED - (100/CA)

## (undated) DEVICE — TOWEL STOP TIMEOUT SAFETY FLAG (40EA/CA)

## (undated) DEVICE — SPLINT PLASTER 3 IN X 15 IN - (50/BX 12BX/CA)

## (undated) DEVICE — SPLINT PLASTER 4 IN  X 15 IN - (50/BX 12BX/CA)

## (undated) DEVICE — GLOVE BIOGEL INDICATOR SZ 8.5 SURGICAL PF LTX - (50/BX 4BX/CA)

## (undated) DEVICE — GLOVE BIOGEL PI INDICATOR SZ 6.5 SURGICAL PF LF - (50/BX 4BX/CA)

## (undated) DEVICE — BLADE SURGICAL #15 - (50/BX 3BX/CA)

## (undated) DEVICE — WATER IRRIG. STER. 1500 ML - (9/CA)

## (undated) DEVICE — GLOVE BIOGEL SZ 8 SURGICAL PF LTX - (50PR/BX 4BX/CA)

## (undated) DEVICE — DRAPE LARGE 3 QUARTER - (20/CA)

## (undated) DEVICE — SUTURE 2-0 ETHILON FS - (36/BX) 18 INCH

## (undated) DEVICE — BANDAGE ELASTIC 4 HONEYCOMB - 4"X5YD LF (20/CA)"

## (undated) DEVICE — GLOVE BIOGEL PI INDICATOR SZ 7.0 SURGICAL PF LF - (50/BX 4BX/CA)